# Patient Record
Sex: MALE | Race: WHITE | NOT HISPANIC OR LATINO
[De-identification: names, ages, dates, MRNs, and addresses within clinical notes are randomized per-mention and may not be internally consistent; named-entity substitution may affect disease eponyms.]

---

## 2016-11-18 RX ORDER — SIMVASTATIN 20 MG/1
1 TABLET, FILM COATED ORAL
Qty: 0 | Refills: 0 | DISCHARGE
Start: 2016-11-18

## 2020-01-01 ENCOUNTER — TRANSCRIPTION ENCOUNTER (OUTPATIENT)
Age: 85
End: 2020-01-01

## 2020-01-01 ENCOUNTER — APPOINTMENT (OUTPATIENT)
Dept: CARE COORDINATION | Facility: HOME HEALTH | Age: 85
End: 2020-01-01
Payer: MEDICARE

## 2020-01-01 ENCOUNTER — INPATIENT (INPATIENT)
Facility: HOSPITAL | Age: 85
LOS: 6 days | Discharge: HOME HEALTH | End: 2020-04-16
Attending: GENERAL ACUTE CARE HOSPITAL | Admitting: GENERAL ACUTE CARE HOSPITAL
Payer: MEDICARE

## 2020-01-01 ENCOUNTER — APPOINTMENT (OUTPATIENT)
Dept: ELECTROPHYSIOLOGY | Facility: CLINIC | Age: 85
End: 2020-01-01

## 2020-01-01 ENCOUNTER — INPATIENT (INPATIENT)
Facility: HOSPITAL | Age: 85
LOS: 6 days | Discharge: HOME HEALTH | End: 2020-02-26
Attending: INTERNAL MEDICINE | Admitting: INTERNAL MEDICINE
Payer: MEDICARE

## 2020-01-01 ENCOUNTER — INPATIENT (INPATIENT)
Facility: HOSPITAL | Age: 85
LOS: 3 days | Discharge: HOME HEALTH | End: 2020-06-08
Attending: INTERNAL MEDICINE | Admitting: INTERNAL MEDICINE
Payer: MEDICARE

## 2020-01-01 ENCOUNTER — APPOINTMENT (OUTPATIENT)
Dept: ELECTROPHYSIOLOGY | Facility: CLINIC | Age: 85
End: 2020-01-01
Payer: MEDICARE

## 2020-01-01 ENCOUNTER — INPATIENT (INPATIENT)
Facility: HOSPITAL | Age: 85
LOS: 9 days | Discharge: HOME HEALTH | End: 2020-06-20
Attending: INTERNAL MEDICINE | Admitting: INTERNAL MEDICINE
Payer: MEDICARE

## 2020-01-01 ENCOUNTER — NON-APPOINTMENT (OUTPATIENT)
Age: 85
End: 2020-01-01

## 2020-01-01 VITALS
OXYGEN SATURATION: 98 % | HEART RATE: 75 BPM | SYSTOLIC BLOOD PRESSURE: 142 MMHG | RESPIRATION RATE: 18 BRPM | DIASTOLIC BLOOD PRESSURE: 76 MMHG

## 2020-01-01 VITALS — HEART RATE: 87 BPM | SYSTOLIC BLOOD PRESSURE: 107 MMHG | DIASTOLIC BLOOD PRESSURE: 64 MMHG

## 2020-01-01 VITALS
SYSTOLIC BLOOD PRESSURE: 117 MMHG | DIASTOLIC BLOOD PRESSURE: 70 MMHG | HEART RATE: 60 BPM | HEART RATE: 84 BPM | RESPIRATION RATE: 16 BRPM | SYSTOLIC BLOOD PRESSURE: 102 MMHG | OXYGEN SATURATION: 98 % | TEMPERATURE: 97 F | DIASTOLIC BLOOD PRESSURE: 50 MMHG | RESPIRATION RATE: 16 BRPM

## 2020-01-01 VITALS
RESPIRATION RATE: 18 BRPM | HEART RATE: 78 BPM | TEMPERATURE: 98 F | OXYGEN SATURATION: 98 % | DIASTOLIC BLOOD PRESSURE: 70 MMHG | SYSTOLIC BLOOD PRESSURE: 118 MMHG

## 2020-01-01 VITALS
HEIGHT: 63 IN | RESPIRATION RATE: 16 BRPM | TEMPERATURE: 98 F | SYSTOLIC BLOOD PRESSURE: 123 MMHG | OXYGEN SATURATION: 98 % | DIASTOLIC BLOOD PRESSURE: 83 MMHG | HEART RATE: 95 BPM

## 2020-01-01 VITALS
DIASTOLIC BLOOD PRESSURE: 69 MMHG | OXYGEN SATURATION: 100 % | SYSTOLIC BLOOD PRESSURE: 122 MMHG | HEART RATE: 67 BPM | RESPIRATION RATE: 17 BRPM

## 2020-01-01 VITALS
HEART RATE: 72 BPM | OXYGEN SATURATION: 96 % | SYSTOLIC BLOOD PRESSURE: 117 MMHG | TEMPERATURE: 99 F | RESPIRATION RATE: 18 BRPM | DIASTOLIC BLOOD PRESSURE: 63 MMHG

## 2020-01-01 VITALS
HEART RATE: 104 BPM | DIASTOLIC BLOOD PRESSURE: 93 MMHG | OXYGEN SATURATION: 97 % | TEMPERATURE: 98 F | SYSTOLIC BLOOD PRESSURE: 147 MMHG | RESPIRATION RATE: 18 BRPM

## 2020-01-01 VITALS
OXYGEN SATURATION: 100 % | WEIGHT: 165.35 LBS | RESPIRATION RATE: 18 BRPM | TEMPERATURE: 98 F | DIASTOLIC BLOOD PRESSURE: 72 MMHG | SYSTOLIC BLOOD PRESSURE: 128 MMHG | HEART RATE: 82 BPM

## 2020-01-01 VITALS
HEART RATE: 68 BPM | DIASTOLIC BLOOD PRESSURE: 52 MMHG | TEMPERATURE: 98 F | OXYGEN SATURATION: 99 % | RESPIRATION RATE: 17 BRPM | SYSTOLIC BLOOD PRESSURE: 108 MMHG

## 2020-01-01 DIAGNOSIS — I21.4 NON-ST ELEVATION (NSTEMI) MYOCARDIAL INFARCTION: ICD-10-CM

## 2020-01-01 DIAGNOSIS — N17.9 ACUTE KIDNEY FAILURE, UNSPECIFIED: ICD-10-CM

## 2020-01-01 DIAGNOSIS — R00.1 BRADYCARDIA, UNSPECIFIED: ICD-10-CM

## 2020-01-01 DIAGNOSIS — Z29.9 ENCOUNTER FOR PROPHYLACTIC MEASURES, UNSPECIFIED: ICD-10-CM

## 2020-01-01 DIAGNOSIS — I50.22 CHRONIC SYSTOLIC (CONGESTIVE) HEART FAILURE: ICD-10-CM

## 2020-01-01 DIAGNOSIS — F32.9 MAJOR DEPRESSIVE DISORDER, SINGLE EPISODE, UNSPECIFIED: ICD-10-CM

## 2020-01-01 DIAGNOSIS — R11.0 NAUSEA: ICD-10-CM

## 2020-01-01 DIAGNOSIS — R06.02 SHORTNESS OF BREATH: ICD-10-CM

## 2020-01-01 DIAGNOSIS — Z95.0 PRESENCE OF CARDIAC PACEMAKER: Chronic | ICD-10-CM

## 2020-01-01 DIAGNOSIS — I25.10 ATHEROSCLEROTIC HEART DISEASE OF NATIVE CORONARY ARTERY WITHOUT ANGINA PECTORIS: ICD-10-CM

## 2020-01-01 DIAGNOSIS — G47.00 INSOMNIA, UNSPECIFIED: ICD-10-CM

## 2020-01-01 DIAGNOSIS — Z02.9 ENCOUNTER FOR ADMINISTRATIVE EXAMINATIONS, UNSPECIFIED: ICD-10-CM

## 2020-01-01 DIAGNOSIS — R07.9 CHEST PAIN, UNSPECIFIED: ICD-10-CM

## 2020-01-01 DIAGNOSIS — K92.2 GASTROINTESTINAL HEMORRHAGE, UNSPECIFIED: ICD-10-CM

## 2020-01-01 DIAGNOSIS — I10 ESSENTIAL (PRIMARY) HYPERTENSION: ICD-10-CM

## 2020-01-01 DIAGNOSIS — Z98.61 CORONARY ANGIOPLASTY STATUS: Chronic | ICD-10-CM

## 2020-01-01 DIAGNOSIS — E46 UNSPECIFIED PROTEIN-CALORIE MALNUTRITION: ICD-10-CM

## 2020-01-01 DIAGNOSIS — D64.9 ANEMIA, UNSPECIFIED: ICD-10-CM

## 2020-01-01 DIAGNOSIS — Z95.0 PRESENCE OF CARDIAC PACEMAKER: ICD-10-CM

## 2020-01-01 DIAGNOSIS — F43.20 ADJUSTMENT DISORDER, UNSPECIFIED: ICD-10-CM

## 2020-01-01 DIAGNOSIS — Z71.89 OTHER SPECIFIED COUNSELING: ICD-10-CM

## 2020-01-01 DIAGNOSIS — E11.9 TYPE 2 DIABETES MELLITUS WITHOUT COMPLICATIONS: ICD-10-CM

## 2020-01-01 DIAGNOSIS — N18.9 CHRONIC KIDNEY DISEASE, UNSPECIFIED: ICD-10-CM

## 2020-01-01 DIAGNOSIS — I50.23 ACUTE ON CHRONIC SYSTOLIC (CONGESTIVE) HEART FAILURE: ICD-10-CM

## 2020-01-01 DIAGNOSIS — I50.9 HEART FAILURE, UNSPECIFIED: ICD-10-CM

## 2020-01-01 DIAGNOSIS — I48.91 UNSPECIFIED ATRIAL FIBRILLATION: ICD-10-CM

## 2020-01-01 DIAGNOSIS — Z51.5 ENCOUNTER FOR PALLIATIVE CARE: ICD-10-CM

## 2020-01-01 DIAGNOSIS — E11.69 TYPE 2 DIABETES MELLITUS WITH OTHER SPECIFIED COMPLICATION: ICD-10-CM

## 2020-01-01 LAB
ALBUMIN SERPL ELPH-MCNC: 2.8 G/DL — LOW (ref 3.3–5)
ALBUMIN SERPL ELPH-MCNC: 2.8 G/DL — LOW (ref 3.3–5)
ALBUMIN SERPL ELPH-MCNC: 3 G/DL — LOW (ref 3.3–5)
ALBUMIN SERPL ELPH-MCNC: 3.1 G/DL — LOW (ref 3.3–5)
ALBUMIN SERPL ELPH-MCNC: 3.1 G/DL — LOW (ref 3.3–5)
ALBUMIN SERPL ELPH-MCNC: 3.2 G/DL — LOW (ref 3.3–5)
ALBUMIN SERPL ELPH-MCNC: 3.5 G/DL — SIGNIFICANT CHANGE UP (ref 3.3–5)
ALBUMIN SERPL ELPH-MCNC: 3.6 G/DL — SIGNIFICANT CHANGE UP (ref 3.3–5)
ALBUMIN SERPL ELPH-MCNC: 3.7 G/DL — SIGNIFICANT CHANGE UP (ref 3.3–5)
ALBUMIN SERPL ELPH-MCNC: 3.9 G/DL — SIGNIFICANT CHANGE UP (ref 3.3–5)
ALBUMIN SERPL ELPH-MCNC: 4.1 G/DL — SIGNIFICANT CHANGE UP (ref 3.3–5)
ALP SERPL-CCNC: 59 U/L — SIGNIFICANT CHANGE UP (ref 40–120)
ALP SERPL-CCNC: 63 U/L — SIGNIFICANT CHANGE UP (ref 40–120)
ALP SERPL-CCNC: 70 U/L — SIGNIFICANT CHANGE UP (ref 40–120)
ALP SERPL-CCNC: 73 U/L — SIGNIFICANT CHANGE UP (ref 40–120)
ALP SERPL-CCNC: 74 U/L — SIGNIFICANT CHANGE UP (ref 40–120)
ALP SERPL-CCNC: 75 U/L — SIGNIFICANT CHANGE UP (ref 40–120)
ALP SERPL-CCNC: 75 U/L — SIGNIFICANT CHANGE UP (ref 40–120)
ALP SERPL-CCNC: 76 U/L — SIGNIFICANT CHANGE UP (ref 40–120)
ALP SERPL-CCNC: 82 U/L — SIGNIFICANT CHANGE UP (ref 40–120)
ALT FLD-CCNC: 13 U/L — SIGNIFICANT CHANGE UP (ref 4–41)
ALT FLD-CCNC: 14 U/L — SIGNIFICANT CHANGE UP (ref 4–41)
ALT FLD-CCNC: 33 U/L — SIGNIFICANT CHANGE UP (ref 4–41)
ALT FLD-CCNC: 41 U/L — SIGNIFICANT CHANGE UP (ref 4–41)
ALT FLD-CCNC: 45 U/L — HIGH (ref 4–41)
ALT FLD-CCNC: 6 U/L — SIGNIFICANT CHANGE UP (ref 4–41)
ALT FLD-CCNC: 6 U/L — SIGNIFICANT CHANGE UP (ref 4–41)
ALT FLD-CCNC: 8 U/L — SIGNIFICANT CHANGE UP (ref 4–41)
ALT FLD-CCNC: 8 U/L — SIGNIFICANT CHANGE UP (ref 4–41)
ALT FLD-CCNC: 9 U/L — SIGNIFICANT CHANGE UP (ref 4–41)
ALT FLD-CCNC: 9 U/L — SIGNIFICANT CHANGE UP (ref 4–41)
ANION GAP SERPL CALC-SCNC: 10 MMO/L — SIGNIFICANT CHANGE UP (ref 7–14)
ANION GAP SERPL CALC-SCNC: 10 MMO/L — SIGNIFICANT CHANGE UP (ref 7–14)
ANION GAP SERPL CALC-SCNC: 11 MMO/L — SIGNIFICANT CHANGE UP (ref 7–14)
ANION GAP SERPL CALC-SCNC: 12 MMO/L — SIGNIFICANT CHANGE UP (ref 7–14)
ANION GAP SERPL CALC-SCNC: 13 MMO/L — SIGNIFICANT CHANGE UP (ref 7–14)
ANION GAP SERPL CALC-SCNC: 14 MMO/L — SIGNIFICANT CHANGE UP (ref 7–14)
ANION GAP SERPL CALC-SCNC: 15 MMO/L — HIGH (ref 7–14)
ANION GAP SERPL CALC-SCNC: 16 MMO/L — HIGH (ref 7–14)
ANION GAP SERPL CALC-SCNC: 18 MMO/L — HIGH (ref 7–14)
ANION GAP SERPL CALC-SCNC: 19 MMO/L — HIGH (ref 7–14)
ANION GAP SERPL CALC-SCNC: 20 MMO/L — HIGH (ref 7–14)
ANION GAP SERPL CALC-SCNC: 21 MMO/L — HIGH (ref 7–14)
ANION GAP SERPL CALC-SCNC: 7 MMO/L — SIGNIFICANT CHANGE UP (ref 7–14)
APPEARANCE UR: CLEAR — SIGNIFICANT CHANGE UP
APTT BLD: 105.4 SEC — HIGH (ref 27.5–36.3)
APTT BLD: 125.6 SEC — CRITICAL HIGH (ref 27.5–36.3)
APTT BLD: 39.9 SEC — HIGH (ref 27.5–36.3)
APTT BLD: 42.6 SEC — HIGH (ref 27.5–36.3)
APTT BLD: 42.8 SEC — HIGH (ref 27.5–36.3)
APTT BLD: 43.2 SEC — HIGH (ref 27.5–36.3)
APTT BLD: 43.2 SEC — HIGH (ref 27.5–36.3)
APTT BLD: 47.2 SEC — HIGH (ref 27.5–36.3)
APTT BLD: 47.2 SEC — HIGH (ref 27.5–36.3)
APTT BLD: 53.5 SEC — HIGH (ref 27.5–36.3)
APTT BLD: 82.5 SEC — HIGH (ref 27.5–36.3)
APTT BLD: 96.2 SEC — HIGH (ref 27.5–36.3)
APTT BLD: > 200 SEC — CRITICAL HIGH (ref 27.5–36.3)
AST SERPL-CCNC: 10 U/L — SIGNIFICANT CHANGE UP (ref 4–40)
AST SERPL-CCNC: 12 U/L — SIGNIFICANT CHANGE UP (ref 4–40)
AST SERPL-CCNC: 14 U/L — SIGNIFICANT CHANGE UP (ref 4–40)
AST SERPL-CCNC: 14 U/L — SIGNIFICANT CHANGE UP (ref 4–40)
AST SERPL-CCNC: 16 U/L — SIGNIFICANT CHANGE UP (ref 4–40)
AST SERPL-CCNC: 17 U/L — SIGNIFICANT CHANGE UP (ref 4–40)
AST SERPL-CCNC: 27 U/L — SIGNIFICANT CHANGE UP (ref 4–40)
AST SERPL-CCNC: 28 U/L — SIGNIFICANT CHANGE UP (ref 4–40)
AST SERPL-CCNC: 35 U/L — SIGNIFICANT CHANGE UP (ref 4–40)
AST SERPL-CCNC: 49 U/L — HIGH (ref 4–40)
AST SERPL-CCNC: 50 U/L — HIGH (ref 4–40)
BACTERIA # UR AUTO: NEGATIVE — SIGNIFICANT CHANGE UP
BASE EXCESS BLDV CALC-SCNC: SIGNIFICANT CHANGE UP MMOL/L
BASOPHILS # BLD AUTO: 0.02 K/UL — SIGNIFICANT CHANGE UP (ref 0–0.2)
BASOPHILS # BLD AUTO: 0.03 K/UL — SIGNIFICANT CHANGE UP (ref 0–0.2)
BASOPHILS # BLD AUTO: 0.04 K/UL — SIGNIFICANT CHANGE UP (ref 0–0.2)
BASOPHILS # BLD AUTO: 0.04 K/UL — SIGNIFICANT CHANGE UP (ref 0–0.2)
BASOPHILS # BLD AUTO: 0.05 K/UL — SIGNIFICANT CHANGE UP (ref 0–0.2)
BASOPHILS NFR BLD AUTO: 0.3 % — SIGNIFICANT CHANGE UP (ref 0–2)
BASOPHILS NFR BLD AUTO: 0.3 % — SIGNIFICANT CHANGE UP (ref 0–2)
BASOPHILS NFR BLD AUTO: 0.4 % — SIGNIFICANT CHANGE UP (ref 0–2)
BASOPHILS NFR BLD AUTO: 0.6 % — SIGNIFICANT CHANGE UP (ref 0–2)
BASOPHILS NFR BLD AUTO: 0.6 % — SIGNIFICANT CHANGE UP (ref 0–2)
BILIRUB DIRECT SERPL-MCNC: < 0.2 MG/DL — SIGNIFICANT CHANGE UP (ref 0.1–0.2)
BILIRUB SERPL-MCNC: 0.2 MG/DL — SIGNIFICANT CHANGE UP (ref 0.2–1.2)
BILIRUB SERPL-MCNC: 0.3 MG/DL — SIGNIFICANT CHANGE UP (ref 0.2–1.2)
BILIRUB SERPL-MCNC: 0.4 MG/DL — SIGNIFICANT CHANGE UP (ref 0.2–1.2)
BILIRUB SERPL-MCNC: 0.4 MG/DL — SIGNIFICANT CHANGE UP (ref 0.2–1.2)
BILIRUB SERPL-MCNC: < 0.2 MG/DL — LOW (ref 0.2–1.2)
BILIRUB SERPL-MCNC: < 0.2 MG/DL — LOW (ref 0.2–1.2)
BILIRUB UR-MCNC: NEGATIVE — SIGNIFICANT CHANGE UP
BLD GP AB SCN SERPL QL: NEGATIVE — SIGNIFICANT CHANGE UP
BLOOD GAS VENOUS - CREATININE: 1.9 MG/DL — HIGH (ref 0.5–1.3)
BLOOD UR QL VISUAL: NEGATIVE — SIGNIFICANT CHANGE UP
BUN SERPL-MCNC: 23 MG/DL — SIGNIFICANT CHANGE UP (ref 7–23)
BUN SERPL-MCNC: 25 MG/DL — HIGH (ref 7–23)
BUN SERPL-MCNC: 28 MG/DL — HIGH (ref 7–23)
BUN SERPL-MCNC: 29 MG/DL — HIGH (ref 7–23)
BUN SERPL-MCNC: 35 MG/DL — HIGH (ref 7–23)
BUN SERPL-MCNC: 37 MG/DL — HIGH (ref 7–23)
BUN SERPL-MCNC: 38 MG/DL — HIGH (ref 7–23)
BUN SERPL-MCNC: 39 MG/DL — HIGH (ref 7–23)
BUN SERPL-MCNC: 41 MG/DL — HIGH (ref 7–23)
BUN SERPL-MCNC: 41 MG/DL — HIGH (ref 7–23)
BUN SERPL-MCNC: 42 MG/DL — HIGH (ref 7–23)
BUN SERPL-MCNC: 47 MG/DL — HIGH (ref 7–23)
BUN SERPL-MCNC: 47 MG/DL — HIGH (ref 7–23)
BUN SERPL-MCNC: 48 MG/DL — HIGH (ref 7–23)
BUN SERPL-MCNC: 49 MG/DL — HIGH (ref 7–23)
BUN SERPL-MCNC: 51 MG/DL — HIGH (ref 7–23)
BUN SERPL-MCNC: 52 MG/DL — HIGH (ref 7–23)
BUN SERPL-MCNC: 54 MG/DL — HIGH (ref 7–23)
BUN SERPL-MCNC: 55 MG/DL — HIGH (ref 7–23)
BUN SERPL-MCNC: 55 MG/DL — HIGH (ref 7–23)
BUN SERPL-MCNC: 56 MG/DL — HIGH (ref 7–23)
BUN SERPL-MCNC: 57 MG/DL — HIGH (ref 7–23)
BUN SERPL-MCNC: 58 MG/DL — HIGH (ref 7–23)
BUN SERPL-MCNC: 59 MG/DL — HIGH (ref 7–23)
BUN SERPL-MCNC: 60 MG/DL — HIGH (ref 7–23)
BUN SERPL-MCNC: 61 MG/DL — HIGH (ref 7–23)
BUN SERPL-MCNC: 62 MG/DL — HIGH (ref 7–23)
BUN SERPL-MCNC: 63 MG/DL — HIGH (ref 7–23)
BUN SERPL-MCNC: 74 MG/DL — HIGH (ref 7–23)
CALCIUM SERPL-MCNC: 10.2 MG/DL — SIGNIFICANT CHANGE UP (ref 8.4–10.5)
CALCIUM SERPL-MCNC: 8.1 MG/DL — LOW (ref 8.4–10.5)
CALCIUM SERPL-MCNC: 8.3 MG/DL — LOW (ref 8.4–10.5)
CALCIUM SERPL-MCNC: 8.4 MG/DL — SIGNIFICANT CHANGE UP (ref 8.4–10.5)
CALCIUM SERPL-MCNC: 8.4 MG/DL — SIGNIFICANT CHANGE UP (ref 8.4–10.5)
CALCIUM SERPL-MCNC: 8.5 MG/DL — SIGNIFICANT CHANGE UP (ref 8.4–10.5)
CALCIUM SERPL-MCNC: 8.6 MG/DL — SIGNIFICANT CHANGE UP (ref 8.4–10.5)
CALCIUM SERPL-MCNC: 8.7 MG/DL — SIGNIFICANT CHANGE UP (ref 8.4–10.5)
CALCIUM SERPL-MCNC: 8.8 MG/DL — SIGNIFICANT CHANGE UP (ref 8.4–10.5)
CALCIUM SERPL-MCNC: 8.9 MG/DL — SIGNIFICANT CHANGE UP (ref 8.4–10.5)
CALCIUM SERPL-MCNC: 9 MG/DL — SIGNIFICANT CHANGE UP (ref 8.4–10.5)
CALCIUM SERPL-MCNC: 9.1 MG/DL — SIGNIFICANT CHANGE UP (ref 8.4–10.5)
CALCIUM SERPL-MCNC: 9.1 MG/DL — SIGNIFICANT CHANGE UP (ref 8.4–10.5)
CALCIUM SERPL-MCNC: 9.3 MG/DL — SIGNIFICANT CHANGE UP (ref 8.4–10.5)
CALCIUM SERPL-MCNC: 9.4 MG/DL — SIGNIFICANT CHANGE UP (ref 8.4–10.5)
CALCIUM SERPL-MCNC: 9.5 MG/DL — SIGNIFICANT CHANGE UP (ref 8.4–10.5)
CALCIUM SERPL-MCNC: 9.5 MG/DL — SIGNIFICANT CHANGE UP (ref 8.4–10.5)
CALCIUM SERPL-MCNC: 9.6 MG/DL — SIGNIFICANT CHANGE UP (ref 8.4–10.5)
CALCIUM SERPL-MCNC: 9.8 MG/DL — SIGNIFICANT CHANGE UP (ref 8.4–10.5)
CHLORIDE BLDV-SCNC: 101 MMOL/L — SIGNIFICANT CHANGE UP (ref 96–108)
CHLORIDE SERPL-SCNC: 100 MMOL/L — SIGNIFICANT CHANGE UP (ref 98–107)
CHLORIDE SERPL-SCNC: 101 MMOL/L — SIGNIFICANT CHANGE UP (ref 98–107)
CHLORIDE SERPL-SCNC: 101 MMOL/L — SIGNIFICANT CHANGE UP (ref 98–107)
CHLORIDE SERPL-SCNC: 102 MMOL/L — SIGNIFICANT CHANGE UP (ref 98–107)
CHLORIDE SERPL-SCNC: 104 MMOL/L — SIGNIFICANT CHANGE UP (ref 98–107)
CHLORIDE SERPL-SCNC: 105 MMOL/L — SIGNIFICANT CHANGE UP (ref 98–107)
CHLORIDE SERPL-SCNC: 94 MMOL/L — LOW (ref 98–107)
CHLORIDE SERPL-SCNC: 95 MMOL/L — LOW (ref 98–107)
CHLORIDE SERPL-SCNC: 96 MMOL/L — LOW (ref 98–107)
CHLORIDE SERPL-SCNC: 97 MMOL/L — LOW (ref 98–107)
CHLORIDE SERPL-SCNC: 97 MMOL/L — LOW (ref 98–107)
CHLORIDE SERPL-SCNC: 98 MMOL/L — SIGNIFICANT CHANGE UP (ref 98–107)
CHLORIDE SERPL-SCNC: 99 MMOL/L — SIGNIFICANT CHANGE UP (ref 98–107)
CHOLEST SERPL-MCNC: 133 MG/DL — SIGNIFICANT CHANGE UP (ref 120–199)
CK MB BLD-MCNC: 3.09 NG/ML — SIGNIFICANT CHANGE UP (ref 1–6.6)
CK MB BLD-MCNC: 3.36 NG/ML — SIGNIFICANT CHANGE UP (ref 1–6.6)
CK MB BLD-MCNC: 3.9 NG/ML — SIGNIFICANT CHANGE UP (ref 1–6.6)
CK MB BLD-MCNC: 4.79 NG/ML — SIGNIFICANT CHANGE UP (ref 1–6.6)
CK MB BLD-MCNC: 6.11 NG/ML — SIGNIFICANT CHANGE UP (ref 1–6.6)
CK MB BLD-MCNC: SIGNIFICANT CHANGE UP (ref 0–2.5)
CK SERPL-CCNC: 48 U/L — SIGNIFICANT CHANGE UP (ref 30–200)
CK SERPL-CCNC: 60 U/L — SIGNIFICANT CHANGE UP (ref 30–200)
CK SERPL-CCNC: 74 U/L — SIGNIFICANT CHANGE UP (ref 30–200)
CK SERPL-CCNC: 84 U/L — SIGNIFICANT CHANGE UP (ref 30–200)
CK SERPL-CCNC: 89 U/L — SIGNIFICANT CHANGE UP (ref 30–200)
CO2 SERPL-SCNC: 18 MMOL/L — LOW (ref 22–31)
CO2 SERPL-SCNC: 19 MMOL/L — LOW (ref 22–31)
CO2 SERPL-SCNC: 20 MMOL/L — LOW (ref 22–31)
CO2 SERPL-SCNC: 20 MMOL/L — LOW (ref 22–31)
CO2 SERPL-SCNC: 21 MMOL/L — LOW (ref 22–31)
CO2 SERPL-SCNC: 21 MMOL/L — LOW (ref 22–31)
CO2 SERPL-SCNC: 22 MMOL/L — SIGNIFICANT CHANGE UP (ref 22–31)
CO2 SERPL-SCNC: 22 MMOL/L — SIGNIFICANT CHANGE UP (ref 22–31)
CO2 SERPL-SCNC: 23 MMOL/L — SIGNIFICANT CHANGE UP (ref 22–31)
CO2 SERPL-SCNC: 24 MMOL/L — SIGNIFICANT CHANGE UP (ref 22–31)
CO2 SERPL-SCNC: 25 MMOL/L — SIGNIFICANT CHANGE UP (ref 22–31)
CO2 SERPL-SCNC: 26 MMOL/L — SIGNIFICANT CHANGE UP (ref 22–31)
CO2 SERPL-SCNC: 26 MMOL/L — SIGNIFICANT CHANGE UP (ref 22–31)
CO2 SERPL-SCNC: 27 MMOL/L — SIGNIFICANT CHANGE UP (ref 22–31)
CO2 SERPL-SCNC: 28 MMOL/L — SIGNIFICANT CHANGE UP (ref 22–31)
CO2 SERPL-SCNC: 29 MMOL/L — SIGNIFICANT CHANGE UP (ref 22–31)
CO2 SERPL-SCNC: 30 MMOL/L — SIGNIFICANT CHANGE UP (ref 22–31)
CO2 SERPL-SCNC: 30 MMOL/L — SIGNIFICANT CHANGE UP (ref 22–31)
COLOR SPEC: SIGNIFICANT CHANGE UP
CREAT ?TM UR-MCNC: 78.9 MG/DL — SIGNIFICANT CHANGE UP
CREAT SERPL-MCNC: 1.35 MG/DL — HIGH (ref 0.5–1.3)
CREAT SERPL-MCNC: 1.4 MG/DL — HIGH (ref 0.5–1.3)
CREAT SERPL-MCNC: 1.46 MG/DL — HIGH (ref 0.5–1.3)
CREAT SERPL-MCNC: 1.5 MG/DL — HIGH (ref 0.5–1.3)
CREAT SERPL-MCNC: 1.5 MG/DL — HIGH (ref 0.5–1.3)
CREAT SERPL-MCNC: 1.62 MG/DL — HIGH (ref 0.5–1.3)
CREAT SERPL-MCNC: 1.62 MG/DL — HIGH (ref 0.5–1.3)
CREAT SERPL-MCNC: 1.69 MG/DL — HIGH (ref 0.5–1.3)
CREAT SERPL-MCNC: 1.76 MG/DL — HIGH (ref 0.5–1.3)
CREAT SERPL-MCNC: 1.77 MG/DL — HIGH (ref 0.5–1.3)
CREAT SERPL-MCNC: 1.79 MG/DL — HIGH (ref 0.5–1.3)
CREAT SERPL-MCNC: 1.83 MG/DL — HIGH (ref 0.5–1.3)
CREAT SERPL-MCNC: 1.83 MG/DL — HIGH (ref 0.5–1.3)
CREAT SERPL-MCNC: 1.88 MG/DL — HIGH (ref 0.5–1.3)
CREAT SERPL-MCNC: 1.89 MG/DL — HIGH (ref 0.5–1.3)
CREAT SERPL-MCNC: 1.91 MG/DL — HIGH (ref 0.5–1.3)
CREAT SERPL-MCNC: 1.91 MG/DL — HIGH (ref 0.5–1.3)
CREAT SERPL-MCNC: 1.92 MG/DL — HIGH (ref 0.5–1.3)
CREAT SERPL-MCNC: 1.92 MG/DL — HIGH (ref 0.5–1.3)
CREAT SERPL-MCNC: 1.93 MG/DL — HIGH (ref 0.5–1.3)
CREAT SERPL-MCNC: 1.94 MG/DL — HIGH (ref 0.5–1.3)
CREAT SERPL-MCNC: 1.98 MG/DL — HIGH (ref 0.5–1.3)
CREAT SERPL-MCNC: 1.98 MG/DL — HIGH (ref 0.5–1.3)
CREAT SERPL-MCNC: 2.21 MG/DL — HIGH (ref 0.5–1.3)
CREAT SERPL-MCNC: 2.21 MG/DL — HIGH (ref 0.5–1.3)
CREAT SERPL-MCNC: 2.25 MG/DL — HIGH (ref 0.5–1.3)
CREAT SERPL-MCNC: 2.26 MG/DL — HIGH (ref 0.5–1.3)
CREAT SERPL-MCNC: 2.3 MG/DL — HIGH (ref 0.5–1.3)
CREAT SERPL-MCNC: 2.34 MG/DL — HIGH (ref 0.5–1.3)
CREAT SERPL-MCNC: 2.36 MG/DL — HIGH (ref 0.5–1.3)
CREAT SERPL-MCNC: 2.42 MG/DL — HIGH (ref 0.5–1.3)
CREAT SERPL-MCNC: 2.46 MG/DL — HIGH (ref 0.5–1.3)
CREAT SERPL-MCNC: 2.61 MG/DL — HIGH (ref 0.5–1.3)
CREAT SERPL-MCNC: 3.38 MG/DL — HIGH (ref 0.5–1.3)
EOSINOPHIL # BLD AUTO: 0.01 K/UL — SIGNIFICANT CHANGE UP (ref 0–0.5)
EOSINOPHIL # BLD AUTO: 0.04 K/UL — SIGNIFICANT CHANGE UP (ref 0–0.5)
EOSINOPHIL # BLD AUTO: 0.04 K/UL — SIGNIFICANT CHANGE UP (ref 0–0.5)
EOSINOPHIL # BLD AUTO: 0.07 K/UL — SIGNIFICANT CHANGE UP (ref 0–0.5)
EOSINOPHIL # BLD AUTO: 0.09 K/UL — SIGNIFICANT CHANGE UP (ref 0–0.5)
EOSINOPHIL NFR BLD AUTO: 0.1 % — SIGNIFICANT CHANGE UP (ref 0–6)
EOSINOPHIL NFR BLD AUTO: 0.5 % — SIGNIFICANT CHANGE UP (ref 0–6)
EOSINOPHIL NFR BLD AUTO: 0.6 % — SIGNIFICANT CHANGE UP (ref 0–6)
EOSINOPHIL NFR BLD AUTO: 0.8 % — SIGNIFICANT CHANGE UP (ref 0–6)
EOSINOPHIL NFR BLD AUTO: 1.2 % — SIGNIFICANT CHANGE UP (ref 0–6)
FERRITIN SERPL-MCNC: 62.94 NG/ML — SIGNIFICANT CHANGE UP (ref 30–400)
FOLATE SERPL-MCNC: > 20 NG/ML — HIGH (ref 4.7–20)
GAS PNL BLDV: 130 MMOL/L — LOW (ref 136–146)
GLUCOSE BLDC GLUCOMTR-MCNC: 137 MG/DL — HIGH (ref 70–99)
GLUCOSE BLDC GLUCOMTR-MCNC: 139 MG/DL — HIGH (ref 70–99)
GLUCOSE BLDC GLUCOMTR-MCNC: 141 MG/DL — HIGH (ref 70–99)
GLUCOSE BLDC GLUCOMTR-MCNC: 145 MG/DL — HIGH (ref 70–99)
GLUCOSE BLDC GLUCOMTR-MCNC: 147 MG/DL — HIGH (ref 70–99)
GLUCOSE BLDC GLUCOMTR-MCNC: 148 MG/DL — HIGH (ref 70–99)
GLUCOSE BLDC GLUCOMTR-MCNC: 150 MG/DL — HIGH (ref 70–99)
GLUCOSE BLDC GLUCOMTR-MCNC: 151 MG/DL — HIGH (ref 70–99)
GLUCOSE BLDC GLUCOMTR-MCNC: 155 MG/DL — HIGH (ref 70–99)
GLUCOSE BLDC GLUCOMTR-MCNC: 157 MG/DL — HIGH (ref 70–99)
GLUCOSE BLDC GLUCOMTR-MCNC: 165 MG/DL — HIGH (ref 70–99)
GLUCOSE BLDC GLUCOMTR-MCNC: 169 MG/DL — HIGH (ref 70–99)
GLUCOSE BLDC GLUCOMTR-MCNC: 173 MG/DL — HIGH (ref 70–99)
GLUCOSE BLDC GLUCOMTR-MCNC: 174 MG/DL — HIGH (ref 70–99)
GLUCOSE BLDC GLUCOMTR-MCNC: 176 MG/DL — HIGH (ref 70–99)
GLUCOSE BLDC GLUCOMTR-MCNC: 177 MG/DL — HIGH (ref 70–99)
GLUCOSE BLDC GLUCOMTR-MCNC: 180 MG/DL — HIGH (ref 70–99)
GLUCOSE BLDC GLUCOMTR-MCNC: 181 MG/DL — HIGH (ref 70–99)
GLUCOSE BLDC GLUCOMTR-MCNC: 183 MG/DL — HIGH (ref 70–99)
GLUCOSE BLDC GLUCOMTR-MCNC: 185 MG/DL — HIGH (ref 70–99)
GLUCOSE BLDC GLUCOMTR-MCNC: 186 MG/DL — HIGH (ref 70–99)
GLUCOSE BLDC GLUCOMTR-MCNC: 187 MG/DL — HIGH (ref 70–99)
GLUCOSE BLDC GLUCOMTR-MCNC: 189 MG/DL — HIGH (ref 70–99)
GLUCOSE BLDC GLUCOMTR-MCNC: 190 MG/DL — HIGH (ref 70–99)
GLUCOSE BLDC GLUCOMTR-MCNC: 191 MG/DL — HIGH (ref 70–99)
GLUCOSE BLDC GLUCOMTR-MCNC: 192 MG/DL — HIGH (ref 70–99)
GLUCOSE BLDC GLUCOMTR-MCNC: 193 MG/DL — HIGH (ref 70–99)
GLUCOSE BLDC GLUCOMTR-MCNC: 196 MG/DL — HIGH (ref 70–99)
GLUCOSE BLDC GLUCOMTR-MCNC: 196 MG/DL — HIGH (ref 70–99)
GLUCOSE BLDC GLUCOMTR-MCNC: 198 MG/DL — HIGH (ref 70–99)
GLUCOSE BLDC GLUCOMTR-MCNC: 199 MG/DL — HIGH (ref 70–99)
GLUCOSE BLDC GLUCOMTR-MCNC: 200 MG/DL — HIGH (ref 70–99)
GLUCOSE BLDC GLUCOMTR-MCNC: 202 MG/DL — HIGH (ref 70–99)
GLUCOSE BLDC GLUCOMTR-MCNC: 202 MG/DL — HIGH (ref 70–99)
GLUCOSE BLDC GLUCOMTR-MCNC: 204 MG/DL — HIGH (ref 70–99)
GLUCOSE BLDC GLUCOMTR-MCNC: 206 MG/DL — HIGH (ref 70–99)
GLUCOSE BLDC GLUCOMTR-MCNC: 208 MG/DL — HIGH (ref 70–99)
GLUCOSE BLDC GLUCOMTR-MCNC: 210 MG/DL — HIGH (ref 70–99)
GLUCOSE BLDC GLUCOMTR-MCNC: 213 MG/DL — HIGH (ref 70–99)
GLUCOSE BLDC GLUCOMTR-MCNC: 214 MG/DL — HIGH (ref 70–99)
GLUCOSE BLDC GLUCOMTR-MCNC: 216 MG/DL — HIGH (ref 70–99)
GLUCOSE BLDC GLUCOMTR-MCNC: 216 MG/DL — HIGH (ref 70–99)
GLUCOSE BLDC GLUCOMTR-MCNC: 218 MG/DL — HIGH (ref 70–99)
GLUCOSE BLDC GLUCOMTR-MCNC: 218 MG/DL — HIGH (ref 70–99)
GLUCOSE BLDC GLUCOMTR-MCNC: 225 MG/DL — HIGH (ref 70–99)
GLUCOSE BLDC GLUCOMTR-MCNC: 225 MG/DL — HIGH (ref 70–99)
GLUCOSE BLDC GLUCOMTR-MCNC: 227 MG/DL — HIGH (ref 70–99)
GLUCOSE BLDC GLUCOMTR-MCNC: 228 MG/DL — HIGH (ref 70–99)
GLUCOSE BLDC GLUCOMTR-MCNC: 236 MG/DL — HIGH (ref 70–99)
GLUCOSE BLDC GLUCOMTR-MCNC: 247 MG/DL — HIGH (ref 70–99)
GLUCOSE BLDC GLUCOMTR-MCNC: 248 MG/DL — HIGH (ref 70–99)
GLUCOSE BLDC GLUCOMTR-MCNC: 258 MG/DL — HIGH (ref 70–99)
GLUCOSE BLDC GLUCOMTR-MCNC: 267 MG/DL — HIGH (ref 70–99)
GLUCOSE BLDC GLUCOMTR-MCNC: 268 MG/DL — HIGH (ref 70–99)
GLUCOSE BLDC GLUCOMTR-MCNC: 273 MG/DL — HIGH (ref 70–99)
GLUCOSE BLDC GLUCOMTR-MCNC: 277 MG/DL — HIGH (ref 70–99)
GLUCOSE BLDC GLUCOMTR-MCNC: 285 MG/DL — HIGH (ref 70–99)
GLUCOSE BLDC GLUCOMTR-MCNC: 308 MG/DL — HIGH (ref 70–99)
GLUCOSE BLDC GLUCOMTR-MCNC: 330 MG/DL — HIGH (ref 70–99)
GLUCOSE BLDC GLUCOMTR-MCNC: 341 MG/DL — HIGH (ref 70–99)
GLUCOSE BLDC GLUCOMTR-MCNC: 346 MG/DL — HIGH (ref 70–99)
GLUCOSE BLDC GLUCOMTR-MCNC: 359 MG/DL — HIGH (ref 70–99)
GLUCOSE BLDV-MCNC: 274 MG/DL — HIGH (ref 70–99)
GLUCOSE SERPL-MCNC: 124 MG/DL — HIGH (ref 70–99)
GLUCOSE SERPL-MCNC: 143 MG/DL — HIGH (ref 70–99)
GLUCOSE SERPL-MCNC: 144 MG/DL — HIGH (ref 70–99)
GLUCOSE SERPL-MCNC: 146 MG/DL — HIGH (ref 70–99)
GLUCOSE SERPL-MCNC: 148 MG/DL — HIGH (ref 70–99)
GLUCOSE SERPL-MCNC: 149 MG/DL — HIGH (ref 70–99)
GLUCOSE SERPL-MCNC: 152 MG/DL — HIGH (ref 70–99)
GLUCOSE SERPL-MCNC: 156 MG/DL — HIGH (ref 70–99)
GLUCOSE SERPL-MCNC: 159 MG/DL — HIGH (ref 70–99)
GLUCOSE SERPL-MCNC: 163 MG/DL — HIGH (ref 70–99)
GLUCOSE SERPL-MCNC: 164 MG/DL — HIGH (ref 70–99)
GLUCOSE SERPL-MCNC: 164 MG/DL — HIGH (ref 70–99)
GLUCOSE SERPL-MCNC: 165 MG/DL — HIGH (ref 70–99)
GLUCOSE SERPL-MCNC: 166 MG/DL — HIGH (ref 70–99)
GLUCOSE SERPL-MCNC: 170 MG/DL — HIGH (ref 70–99)
GLUCOSE SERPL-MCNC: 172 MG/DL — HIGH (ref 70–99)
GLUCOSE SERPL-MCNC: 176 MG/DL — HIGH (ref 70–99)
GLUCOSE SERPL-MCNC: 180 MG/DL — HIGH (ref 70–99)
GLUCOSE SERPL-MCNC: 183 MG/DL — HIGH (ref 70–99)
GLUCOSE SERPL-MCNC: 189 MG/DL — HIGH (ref 70–99)
GLUCOSE SERPL-MCNC: 191 MG/DL — HIGH (ref 70–99)
GLUCOSE SERPL-MCNC: 193 MG/DL — HIGH (ref 70–99)
GLUCOSE SERPL-MCNC: 194 MG/DL — HIGH (ref 70–99)
GLUCOSE SERPL-MCNC: 198 MG/DL — HIGH (ref 70–99)
GLUCOSE SERPL-MCNC: 203 MG/DL — HIGH (ref 70–99)
GLUCOSE SERPL-MCNC: 203 MG/DL — HIGH (ref 70–99)
GLUCOSE SERPL-MCNC: 212 MG/DL — HIGH (ref 70–99)
GLUCOSE SERPL-MCNC: 221 MG/DL — HIGH (ref 70–99)
GLUCOSE SERPL-MCNC: 235 MG/DL — HIGH (ref 70–99)
GLUCOSE SERPL-MCNC: 265 MG/DL — HIGH (ref 70–99)
GLUCOSE SERPL-MCNC: 278 MG/DL — HIGH (ref 70–99)
GLUCOSE SERPL-MCNC: 351 MG/DL — HIGH (ref 70–99)
GLUCOSE UR-MCNC: 300 — HIGH
HAPTOGLOB SERPL-MCNC: 180 MG/DL — SIGNIFICANT CHANGE UP (ref 34–200)
HBA1C BLD-MCNC: 6.3 % — HIGH (ref 4–5.6)
HBA1C BLD-MCNC: 6.6 % — HIGH (ref 4–5.6)
HBA1C BLD-MCNC: 7.3 % — HIGH (ref 4–5.6)
HBA1C BLD-MCNC: 7.6 % — HIGH (ref 4–5.6)
HBV CORE AB SER-ACNC: NONREACTIVE — SIGNIFICANT CHANGE UP
HBV SURFACE AB SER-ACNC: NONREACTIVE — SIGNIFICANT CHANGE UP
HBV SURFACE AG SER-ACNC: NONREACTIVE — SIGNIFICANT CHANGE UP
HCO3 BLDV-SCNC: SIGNIFICANT CHANGE UP MMOL/L (ref 20–27)
HCT VFR BLD CALC: 24.4 % — LOW (ref 39–50)
HCT VFR BLD CALC: 24.6 % — LOW (ref 39–50)
HCT VFR BLD CALC: 25.1 % — LOW (ref 39–50)
HCT VFR BLD CALC: 25.2 % — LOW (ref 39–50)
HCT VFR BLD CALC: 25.7 % — LOW (ref 39–50)
HCT VFR BLD CALC: 25.8 % — LOW (ref 39–50)
HCT VFR BLD CALC: 26.1 % — LOW (ref 39–50)
HCT VFR BLD CALC: 26.2 % — LOW (ref 39–50)
HCT VFR BLD CALC: 26.6 % — LOW (ref 39–50)
HCT VFR BLD CALC: 27 % — LOW (ref 39–50)
HCT VFR BLD CALC: 27.2 % — LOW (ref 39–50)
HCT VFR BLD CALC: 27.3 % — LOW (ref 39–50)
HCT VFR BLD CALC: 27.4 % — LOW (ref 39–50)
HCT VFR BLD CALC: 27.5 % — LOW (ref 39–50)
HCT VFR BLD CALC: 27.7 % — LOW (ref 39–50)
HCT VFR BLD CALC: 27.8 % — LOW (ref 39–50)
HCT VFR BLD CALC: 27.9 % — LOW (ref 39–50)
HCT VFR BLD CALC: 28.1 % — LOW (ref 39–50)
HCT VFR BLD CALC: 28.5 % — LOW (ref 39–50)
HCT VFR BLD CALC: 29.1 % — LOW (ref 39–50)
HCT VFR BLD CALC: 29.7 % — LOW (ref 39–50)
HCT VFR BLD CALC: 30 % — LOW (ref 39–50)
HCT VFR BLD CALC: 30.9 % — LOW (ref 39–50)
HCT VFR BLD CALC: 31 % — LOW (ref 39–50)
HCT VFR BLD CALC: 31 % — LOW (ref 39–50)
HCT VFR BLD CALC: 31.5 % — LOW (ref 39–50)
HCT VFR BLD CALC: 31.5 % — LOW (ref 39–50)
HCT VFR BLD CALC: 32.7 % — LOW (ref 39–50)
HCT VFR BLD CALC: 32.8 % — LOW (ref 39–50)
HCT VFR BLD CALC: 33 % — LOW (ref 39–50)
HCT VFR BLD CALC: 33.2 % — LOW (ref 39–50)
HCT VFR BLD CALC: 33.4 % — LOW (ref 39–50)
HCT VFR BLD CALC: 34.3 % — LOW (ref 39–50)
HCT VFR BLD CALC: 34.8 % — LOW (ref 39–50)
HCT VFR BLD CALC: 35.6 % — LOW (ref 39–50)
HCT VFR BLD CALC: 37.4 % — LOW (ref 39–50)
HCT VFR BLDV CALC: 29.5 % — LOW (ref 39–51)
HCV AB S/CO SERPL IA: 0.09 S/CO — SIGNIFICANT CHANGE UP (ref 0–0.99)
HCV AB SERPL-IMP: SIGNIFICANT CHANGE UP
HCV RNA SERPL NAA DL=5-ACNC: NOT DETECTED IU/ML — SIGNIFICANT CHANGE UP
HCV RNA SPEC NAA+PROBE-LOG IU: SIGNIFICANT CHANGE UP
HDLC SERPL-MCNC: 63 MG/DL — HIGH (ref 35–55)
HGB BLD-MCNC: 10 G/DL — LOW (ref 13–17)
HGB BLD-MCNC: 10 G/DL — LOW (ref 13–17)
HGB BLD-MCNC: 10.2 G/DL — LOW (ref 13–17)
HGB BLD-MCNC: 10.4 G/DL — LOW (ref 13–17)
HGB BLD-MCNC: 10.4 G/DL — LOW (ref 13–17)
HGB BLD-MCNC: 10.6 G/DL — LOW (ref 13–17)
HGB BLD-MCNC: 11.8 G/DL — LOW (ref 13–17)
HGB BLD-MCNC: 7.4 G/DL — LOW (ref 13–17)
HGB BLD-MCNC: 7.6 G/DL — LOW (ref 13–17)
HGB BLD-MCNC: 7.7 G/DL — LOW (ref 13–17)
HGB BLD-MCNC: 7.7 G/DL — LOW (ref 13–17)
HGB BLD-MCNC: 7.8 G/DL — LOW (ref 13–17)
HGB BLD-MCNC: 7.9 G/DL — LOW (ref 13–17)
HGB BLD-MCNC: 8 G/DL — LOW (ref 13–17)
HGB BLD-MCNC: 8.2 G/DL — LOW (ref 13–17)
HGB BLD-MCNC: 8.4 G/DL — LOW (ref 13–17)
HGB BLD-MCNC: 8.5 G/DL — LOW (ref 13–17)
HGB BLD-MCNC: 8.6 G/DL — LOW (ref 13–17)
HGB BLD-MCNC: 8.7 G/DL — LOW (ref 13–17)
HGB BLD-MCNC: 8.7 G/DL — LOW (ref 13–17)
HGB BLD-MCNC: 8.8 G/DL — LOW (ref 13–17)
HGB BLD-MCNC: 9 G/DL — LOW (ref 13–17)
HGB BLD-MCNC: 9.2 G/DL — LOW (ref 13–17)
HGB BLD-MCNC: 9.4 G/DL — LOW (ref 13–17)
HGB BLD-MCNC: 9.5 G/DL — LOW (ref 13–17)
HGB BLD-MCNC: 9.5 G/DL — LOW (ref 13–17)
HGB BLD-MCNC: 9.6 G/DL — LOW (ref 13–17)
HGB BLD-MCNC: 9.6 G/DL — LOW (ref 13–17)
HGB BLD-MCNC: 9.7 G/DL — LOW (ref 13–17)
HGB BLD-MCNC: 9.9 G/DL — LOW (ref 13–17)
HGB BLDV-MCNC: 9.5 G/DL — LOW (ref 13–17)
HIV COMBO RESULT: SIGNIFICANT CHANGE UP
HIV1+2 AB SPEC QL: SIGNIFICANT CHANGE UP
HYALINE CASTS # UR AUTO: NEGATIVE — SIGNIFICANT CHANGE UP
IMM GRANULOCYTES NFR BLD AUTO: 0.1 % — SIGNIFICANT CHANGE UP (ref 0–1.5)
IMM GRANULOCYTES NFR BLD AUTO: 0.3 % — SIGNIFICANT CHANGE UP (ref 0–1.5)
IMM GRANULOCYTES NFR BLD AUTO: 0.4 % — SIGNIFICANT CHANGE UP (ref 0–1.5)
INR BLD: 1.09 — SIGNIFICANT CHANGE UP (ref 0.88–1.17)
INR BLD: 1.12 — SIGNIFICANT CHANGE UP (ref 0.88–1.17)
INR BLD: 1.13 — SIGNIFICANT CHANGE UP (ref 0.88–1.17)
INR BLD: 1.14 — SIGNIFICANT CHANGE UP (ref 0.88–1.17)
IRON SATN MFR SERPL: 22 UG/DL — LOW (ref 45–165)
IRON SATN MFR SERPL: 298 UG/DL — SIGNIFICANT CHANGE UP (ref 155–535)
KETONES UR-MCNC: NEGATIVE — SIGNIFICANT CHANGE UP
LACTATE BLDV-MCNC: 1.8 MMOL/L — SIGNIFICANT CHANGE UP (ref 0.5–2)
LDH SERPL L TO P-CCNC: 177 U/L — SIGNIFICANT CHANGE UP (ref 135–225)
LEUKOCYTE ESTERASE UR-ACNC: NEGATIVE — SIGNIFICANT CHANGE UP
LIDOCAIN IGE QN: 17.8 U/L — SIGNIFICANT CHANGE UP (ref 7–60)
LIPID PNL WITH DIRECT LDL SERPL: 63 MG/DL — SIGNIFICANT CHANGE UP
LYMPHOCYTES # BLD AUTO: 0.63 K/UL — LOW (ref 1–3.3)
LYMPHOCYTES # BLD AUTO: 0.71 K/UL — LOW (ref 1–3.3)
LYMPHOCYTES # BLD AUTO: 0.79 K/UL — LOW (ref 1–3.3)
LYMPHOCYTES # BLD AUTO: 0.82 K/UL — LOW (ref 1–3.3)
LYMPHOCYTES # BLD AUTO: 0.84 K/UL — LOW (ref 1–3.3)
LYMPHOCYTES # BLD AUTO: 10.8 % — LOW (ref 13–44)
LYMPHOCYTES # BLD AUTO: 8.7 % — LOW (ref 13–44)
LYMPHOCYTES # BLD AUTO: 9 % — LOW (ref 13–44)
LYMPHOCYTES # BLD AUTO: 9.2 % — LOW (ref 13–44)
LYMPHOCYTES # BLD AUTO: 9.5 % — LOW (ref 13–44)
MAGNESIUM SERPL-MCNC: 1.5 MG/DL — LOW (ref 1.6–2.6)
MAGNESIUM SERPL-MCNC: 1.5 MG/DL — LOW (ref 1.6–2.6)
MAGNESIUM SERPL-MCNC: 1.6 MG/DL — SIGNIFICANT CHANGE UP (ref 1.6–2.6)
MAGNESIUM SERPL-MCNC: 1.7 MG/DL — SIGNIFICANT CHANGE UP (ref 1.6–2.6)
MAGNESIUM SERPL-MCNC: 1.8 MG/DL — SIGNIFICANT CHANGE UP (ref 1.6–2.6)
MAGNESIUM SERPL-MCNC: 1.8 MG/DL — SIGNIFICANT CHANGE UP (ref 1.6–2.6)
MAGNESIUM SERPL-MCNC: 1.9 MG/DL — SIGNIFICANT CHANGE UP (ref 1.6–2.6)
MAGNESIUM SERPL-MCNC: 2 MG/DL — SIGNIFICANT CHANGE UP (ref 1.6–2.6)
MAGNESIUM SERPL-MCNC: 2.1 MG/DL — SIGNIFICANT CHANGE UP (ref 1.6–2.6)
MCHC RBC-ENTMCNC: 24.4 PG — LOW (ref 27–34)
MCHC RBC-ENTMCNC: 24.6 PG — LOW (ref 27–34)
MCHC RBC-ENTMCNC: 24.6 PG — LOW (ref 27–34)
MCHC RBC-ENTMCNC: 24.8 PG — LOW (ref 27–34)
MCHC RBC-ENTMCNC: 25 PG — LOW (ref 27–34)
MCHC RBC-ENTMCNC: 25.1 PG — LOW (ref 27–34)
MCHC RBC-ENTMCNC: 25.2 PG — LOW (ref 27–34)
MCHC RBC-ENTMCNC: 25.4 PG — LOW (ref 27–34)
MCHC RBC-ENTMCNC: 25.5 PG — LOW (ref 27–34)
MCHC RBC-ENTMCNC: 25.6 PG — LOW (ref 27–34)
MCHC RBC-ENTMCNC: 25.8 PG — LOW (ref 27–34)
MCHC RBC-ENTMCNC: 26 PG — LOW (ref 27–34)
MCHC RBC-ENTMCNC: 26.2 PG — LOW (ref 27–34)
MCHC RBC-ENTMCNC: 26.6 PG — LOW (ref 27–34)
MCHC RBC-ENTMCNC: 26.7 PG — LOW (ref 27–34)
MCHC RBC-ENTMCNC: 26.9 PG — LOW (ref 27–34)
MCHC RBC-ENTMCNC: 27.5 PG — SIGNIFICANT CHANGE UP (ref 27–34)
MCHC RBC-ENTMCNC: 27.6 PG — SIGNIFICANT CHANGE UP (ref 27–34)
MCHC RBC-ENTMCNC: 27.6 PG — SIGNIFICANT CHANGE UP (ref 27–34)
MCHC RBC-ENTMCNC: 27.7 PG — SIGNIFICANT CHANGE UP (ref 27–34)
MCHC RBC-ENTMCNC: 27.8 PG — SIGNIFICANT CHANGE UP (ref 27–34)
MCHC RBC-ENTMCNC: 28.1 PG — SIGNIFICANT CHANGE UP (ref 27–34)
MCHC RBC-ENTMCNC: 28.2 PG — SIGNIFICANT CHANGE UP (ref 27–34)
MCHC RBC-ENTMCNC: 28.3 % — LOW (ref 32–36)
MCHC RBC-ENTMCNC: 28.3 PG — SIGNIFICANT CHANGE UP (ref 27–34)
MCHC RBC-ENTMCNC: 28.5 PG — SIGNIFICANT CHANGE UP (ref 27–34)
MCHC RBC-ENTMCNC: 28.5 PG — SIGNIFICANT CHANGE UP (ref 27–34)
MCHC RBC-ENTMCNC: 28.6 PG — SIGNIFICANT CHANGE UP (ref 27–34)
MCHC RBC-ENTMCNC: 28.6 PG — SIGNIFICANT CHANGE UP (ref 27–34)
MCHC RBC-ENTMCNC: 28.8 PG — SIGNIFICANT CHANGE UP (ref 27–34)
MCHC RBC-ENTMCNC: 28.9 % — LOW (ref 32–36)
MCHC RBC-ENTMCNC: 28.9 % — LOW (ref 32–36)
MCHC RBC-ENTMCNC: 28.9 PG — SIGNIFICANT CHANGE UP (ref 27–34)
MCHC RBC-ENTMCNC: 29.2 % — LOW (ref 32–36)
MCHC RBC-ENTMCNC: 29.2 PG — SIGNIFICANT CHANGE UP (ref 27–34)
MCHC RBC-ENTMCNC: 29.4 % — LOW (ref 32–36)
MCHC RBC-ENTMCNC: 29.9 % — LOW (ref 32–36)
MCHC RBC-ENTMCNC: 29.9 % — LOW (ref 32–36)
MCHC RBC-ENTMCNC: 30 % — LOW (ref 32–36)
MCHC RBC-ENTMCNC: 30.1 % — LOW (ref 32–36)
MCHC RBC-ENTMCNC: 30.1 % — LOW (ref 32–36)
MCHC RBC-ENTMCNC: 30.2 % — LOW (ref 32–36)
MCHC RBC-ENTMCNC: 30.3 % — LOW (ref 32–36)
MCHC RBC-ENTMCNC: 30.3 % — LOW (ref 32–36)
MCHC RBC-ENTMCNC: 30.4 % — LOW (ref 32–36)
MCHC RBC-ENTMCNC: 30.4 % — LOW (ref 32–36)
MCHC RBC-ENTMCNC: 30.5 % — LOW (ref 32–36)
MCHC RBC-ENTMCNC: 30.5 % — LOW (ref 32–36)
MCHC RBC-ENTMCNC: 30.6 % — LOW (ref 32–36)
MCHC RBC-ENTMCNC: 30.6 % — LOW (ref 32–36)
MCHC RBC-ENTMCNC: 30.8 % — LOW (ref 32–36)
MCHC RBC-ENTMCNC: 30.9 % — LOW (ref 32–36)
MCHC RBC-ENTMCNC: 31 % — LOW (ref 32–36)
MCHC RBC-ENTMCNC: 31.1 % — LOW (ref 32–36)
MCHC RBC-ENTMCNC: 31.3 % — LOW (ref 32–36)
MCHC RBC-ENTMCNC: 31.6 % — LOW (ref 32–36)
MCHC RBC-ENTMCNC: 32.3 % — SIGNIFICANT CHANGE UP (ref 32–36)
MCV RBC AUTO: 82.3 FL — SIGNIFICANT CHANGE UP (ref 80–100)
MCV RBC AUTO: 82.4 FL — SIGNIFICANT CHANGE UP (ref 80–100)
MCV RBC AUTO: 83 FL — SIGNIFICANT CHANGE UP (ref 80–100)
MCV RBC AUTO: 83.7 FL — SIGNIFICANT CHANGE UP (ref 80–100)
MCV RBC AUTO: 84.2 FL — SIGNIFICANT CHANGE UP (ref 80–100)
MCV RBC AUTO: 84.2 FL — SIGNIFICANT CHANGE UP (ref 80–100)
MCV RBC AUTO: 84.5 FL — SIGNIFICANT CHANGE UP (ref 80–100)
MCV RBC AUTO: 84.6 FL — SIGNIFICANT CHANGE UP (ref 80–100)
MCV RBC AUTO: 84.9 FL — SIGNIFICANT CHANGE UP (ref 80–100)
MCV RBC AUTO: 85 FL — SIGNIFICANT CHANGE UP (ref 80–100)
MCV RBC AUTO: 85.2 FL — SIGNIFICANT CHANGE UP (ref 80–100)
MCV RBC AUTO: 85.2 FL — SIGNIFICANT CHANGE UP (ref 80–100)
MCV RBC AUTO: 85.9 FL — SIGNIFICANT CHANGE UP (ref 80–100)
MCV RBC AUTO: 85.9 FL — SIGNIFICANT CHANGE UP (ref 80–100)
MCV RBC AUTO: 86.1 FL — SIGNIFICANT CHANGE UP (ref 80–100)
MCV RBC AUTO: 86.4 FL — SIGNIFICANT CHANGE UP (ref 80–100)
MCV RBC AUTO: 86.5 FL — SIGNIFICANT CHANGE UP (ref 80–100)
MCV RBC AUTO: 87.1 FL — SIGNIFICANT CHANGE UP (ref 80–100)
MCV RBC AUTO: 89.5 FL — SIGNIFICANT CHANGE UP (ref 80–100)
MCV RBC AUTO: 89.7 FL — SIGNIFICANT CHANGE UP (ref 80–100)
MCV RBC AUTO: 89.8 FL — SIGNIFICANT CHANGE UP (ref 80–100)
MCV RBC AUTO: 91 FL — SIGNIFICANT CHANGE UP (ref 80–100)
MCV RBC AUTO: 91 FL — SIGNIFICANT CHANGE UP (ref 80–100)
MCV RBC AUTO: 91.5 FL — SIGNIFICANT CHANGE UP (ref 80–100)
MCV RBC AUTO: 91.5 FL — SIGNIFICANT CHANGE UP (ref 80–100)
MCV RBC AUTO: 91.8 FL — SIGNIFICANT CHANGE UP (ref 80–100)
MCV RBC AUTO: 91.9 FL — SIGNIFICANT CHANGE UP (ref 80–100)
MCV RBC AUTO: 91.9 FL — SIGNIFICANT CHANGE UP (ref 80–100)
MCV RBC AUTO: 92.1 FL — SIGNIFICANT CHANGE UP (ref 80–100)
MCV RBC AUTO: 92.3 FL — SIGNIFICANT CHANGE UP (ref 80–100)
MCV RBC AUTO: 92.7 FL — SIGNIFICANT CHANGE UP (ref 80–100)
MCV RBC AUTO: 93.1 FL — SIGNIFICANT CHANGE UP (ref 80–100)
MCV RBC AUTO: 93.2 FL — SIGNIFICANT CHANGE UP (ref 80–100)
MCV RBC AUTO: 93.4 FL — SIGNIFICANT CHANGE UP (ref 80–100)
MCV RBC AUTO: 95.5 FL — SIGNIFICANT CHANGE UP (ref 80–100)
MCV RBC AUTO: 95.7 FL — SIGNIFICANT CHANGE UP (ref 80–100)
MONOCYTES # BLD AUTO: 0.65 K/UL — SIGNIFICANT CHANGE UP (ref 0–0.9)
MONOCYTES # BLD AUTO: 0.69 K/UL — SIGNIFICANT CHANGE UP (ref 0–0.9)
MONOCYTES # BLD AUTO: 0.72 K/UL — SIGNIFICANT CHANGE UP (ref 0–0.9)
MONOCYTES # BLD AUTO: 0.74 K/UL — SIGNIFICANT CHANGE UP (ref 0–0.9)
MONOCYTES # BLD AUTO: 0.86 K/UL — SIGNIFICANT CHANGE UP (ref 0–0.9)
MONOCYTES NFR BLD AUTO: 10.6 % — SIGNIFICANT CHANGE UP (ref 2–14)
MONOCYTES NFR BLD AUTO: 8 % — SIGNIFICANT CHANGE UP (ref 2–14)
MONOCYTES NFR BLD AUTO: 8.4 % — SIGNIFICANT CHANGE UP (ref 2–14)
MONOCYTES NFR BLD AUTO: 9.3 % — SIGNIFICANT CHANGE UP (ref 2–14)
MONOCYTES NFR BLD AUTO: 9.5 % — SIGNIFICANT CHANGE UP (ref 2–14)
NEUTROPHILS # BLD AUTO: 5.53 K/UL — SIGNIFICANT CHANGE UP (ref 1.8–7.4)
NEUTROPHILS # BLD AUTO: 6.02 K/UL — SIGNIFICANT CHANGE UP (ref 1.8–7.4)
NEUTROPHILS # BLD AUTO: 6.32 K/UL — SIGNIFICANT CHANGE UP (ref 1.8–7.4)
NEUTROPHILS # BLD AUTO: 7.02 K/UL — SIGNIFICANT CHANGE UP (ref 1.8–7.4)
NEUTROPHILS # BLD AUTO: 7.25 K/UL — SIGNIFICANT CHANGE UP (ref 1.8–7.4)
NEUTROPHILS NFR BLD AUTO: 77.7 % — HIGH (ref 43–77)
NEUTROPHILS NFR BLD AUTO: 78.8 % — HIGH (ref 43–77)
NEUTROPHILS NFR BLD AUTO: 80.2 % — HIGH (ref 43–77)
NEUTROPHILS NFR BLD AUTO: 81.4 % — HIGH (ref 43–77)
NEUTROPHILS NFR BLD AUTO: 81.9 % — HIGH (ref 43–77)
NITRITE UR-MCNC: NEGATIVE — SIGNIFICANT CHANGE UP
NRBC # FLD: 0 K/UL — SIGNIFICANT CHANGE UP (ref 0–0)
NT-PROBNP SERPL-SCNC: SIGNIFICANT CHANGE UP PG/ML
OB PNL STL: POSITIVE — SIGNIFICANT CHANGE UP
PCO2 BLDV: 38 MMHG — LOW (ref 41–51)
PH BLDV: SIGNIFICANT CHANGE UP PH (ref 7.32–7.43)
PH UR: 5.5 — SIGNIFICANT CHANGE UP (ref 5–8)
PHOSPHATE SERPL-MCNC: 1.7 MG/DL — LOW (ref 2.5–4.5)
PHOSPHATE SERPL-MCNC: 1.7 MG/DL — LOW (ref 2.5–4.5)
PHOSPHATE SERPL-MCNC: 1.8 MG/DL — LOW (ref 2.5–4.5)
PHOSPHATE SERPL-MCNC: 1.9 MG/DL — LOW (ref 2.5–4.5)
PHOSPHATE SERPL-MCNC: 2.4 MG/DL — LOW (ref 2.5–4.5)
PHOSPHATE SERPL-MCNC: 2.9 MG/DL — SIGNIFICANT CHANGE UP (ref 2.5–4.5)
PHOSPHATE SERPL-MCNC: 3.2 MG/DL — SIGNIFICANT CHANGE UP (ref 2.5–4.5)
PHOSPHATE SERPL-MCNC: 3.5 MG/DL — SIGNIFICANT CHANGE UP (ref 2.5–4.5)
PHOSPHATE SERPL-MCNC: 3.5 MG/DL — SIGNIFICANT CHANGE UP (ref 2.5–4.5)
PHOSPHATE SERPL-MCNC: 3.7 MG/DL — SIGNIFICANT CHANGE UP (ref 2.5–4.5)
PHOSPHATE SERPL-MCNC: 3.8 MG/DL — SIGNIFICANT CHANGE UP (ref 2.5–4.5)
PHOSPHATE SERPL-MCNC: 3.8 MG/DL — SIGNIFICANT CHANGE UP (ref 2.5–4.5)
PHOSPHATE SERPL-MCNC: 4.2 MG/DL — SIGNIFICANT CHANGE UP (ref 2.5–4.5)
PHOSPHATE SERPL-MCNC: 4.3 MG/DL — SIGNIFICANT CHANGE UP (ref 2.5–4.5)
PHOSPHATE SERPL-MCNC: 4.6 MG/DL — HIGH (ref 2.5–4.5)
PHOSPHATE SERPL-MCNC: 4.8 MG/DL — HIGH (ref 2.5–4.5)
PLATELET # BLD AUTO: 155 K/UL — SIGNIFICANT CHANGE UP (ref 150–400)
PLATELET # BLD AUTO: 178 K/UL — SIGNIFICANT CHANGE UP (ref 150–400)
PLATELET # BLD AUTO: 191 K/UL — SIGNIFICANT CHANGE UP (ref 150–400)
PLATELET # BLD AUTO: 192 K/UL — SIGNIFICANT CHANGE UP (ref 150–400)
PLATELET # BLD AUTO: 199 K/UL — SIGNIFICANT CHANGE UP (ref 150–400)
PLATELET # BLD AUTO: 200 K/UL — SIGNIFICANT CHANGE UP (ref 150–400)
PLATELET # BLD AUTO: 201 K/UL — SIGNIFICANT CHANGE UP (ref 150–400)
PLATELET # BLD AUTO: 202 K/UL — SIGNIFICANT CHANGE UP (ref 150–400)
PLATELET # BLD AUTO: 203 K/UL — SIGNIFICANT CHANGE UP (ref 150–400)
PLATELET # BLD AUTO: 210 K/UL — SIGNIFICANT CHANGE UP (ref 150–400)
PLATELET # BLD AUTO: 210 K/UL — SIGNIFICANT CHANGE UP (ref 150–400)
PLATELET # BLD AUTO: 218 K/UL — SIGNIFICANT CHANGE UP (ref 150–400)
PLATELET # BLD AUTO: 220 K/UL — SIGNIFICANT CHANGE UP (ref 150–400)
PLATELET # BLD AUTO: 223 K/UL — SIGNIFICANT CHANGE UP (ref 150–400)
PLATELET # BLD AUTO: 226 K/UL — SIGNIFICANT CHANGE UP (ref 150–400)
PLATELET # BLD AUTO: 228 K/UL — SIGNIFICANT CHANGE UP (ref 150–400)
PLATELET # BLD AUTO: 237 K/UL — SIGNIFICANT CHANGE UP (ref 150–400)
PLATELET # BLD AUTO: 243 K/UL — SIGNIFICANT CHANGE UP (ref 150–400)
PLATELET # BLD AUTO: 249 K/UL — SIGNIFICANT CHANGE UP (ref 150–400)
PLATELET # BLD AUTO: 260 K/UL — SIGNIFICANT CHANGE UP (ref 150–400)
PLATELET # BLD AUTO: 269 K/UL — SIGNIFICANT CHANGE UP (ref 150–400)
PLATELET # BLD AUTO: 281 K/UL — SIGNIFICANT CHANGE UP (ref 150–400)
PLATELET # BLD AUTO: 282 K/UL — SIGNIFICANT CHANGE UP (ref 150–400)
PLATELET # BLD AUTO: 285 K/UL — SIGNIFICANT CHANGE UP (ref 150–400)
PLATELET # BLD AUTO: 286 K/UL — SIGNIFICANT CHANGE UP (ref 150–400)
PLATELET # BLD AUTO: 298 K/UL — SIGNIFICANT CHANGE UP (ref 150–400)
PLATELET # BLD AUTO: 300 K/UL — SIGNIFICANT CHANGE UP (ref 150–400)
PLATELET # BLD AUTO: 306 K/UL — SIGNIFICANT CHANGE UP (ref 150–400)
PLATELET # BLD AUTO: 352 K/UL — SIGNIFICANT CHANGE UP (ref 150–400)
PLATELET # BLD AUTO: 359 K/UL — SIGNIFICANT CHANGE UP (ref 150–400)
PLATELET # BLD AUTO: 362 K/UL — SIGNIFICANT CHANGE UP (ref 150–400)
PLATELET # BLD AUTO: 366 K/UL — SIGNIFICANT CHANGE UP (ref 150–400)
PLATELET # BLD AUTO: 380 K/UL — SIGNIFICANT CHANGE UP (ref 150–400)
PLATELET # BLD AUTO: 410 K/UL — HIGH (ref 150–400)
PLATELET # BLD AUTO: 427 K/UL — HIGH (ref 150–400)
PMV BLD: 10.1 FL — SIGNIFICANT CHANGE UP (ref 7–13)
PMV BLD: 10.3 FL — SIGNIFICANT CHANGE UP (ref 7–13)
PMV BLD: 10.4 FL — SIGNIFICANT CHANGE UP (ref 7–13)
PMV BLD: 10.4 FL — SIGNIFICANT CHANGE UP (ref 7–13)
PMV BLD: 10.5 FL — SIGNIFICANT CHANGE UP (ref 7–13)
PMV BLD: 10.5 FL — SIGNIFICANT CHANGE UP (ref 7–13)
PMV BLD: 10.6 FL — SIGNIFICANT CHANGE UP (ref 7–13)
PMV BLD: 10.8 FL — SIGNIFICANT CHANGE UP (ref 7–13)
PMV BLD: 10.9 FL — SIGNIFICANT CHANGE UP (ref 7–13)
PMV BLD: 11 FL — SIGNIFICANT CHANGE UP (ref 7–13)
PMV BLD: 11.1 FL — SIGNIFICANT CHANGE UP (ref 7–13)
PMV BLD: 11.2 FL — SIGNIFICANT CHANGE UP (ref 7–13)
PMV BLD: 11.3 FL — SIGNIFICANT CHANGE UP (ref 7–13)
PMV BLD: 11.4 FL — SIGNIFICANT CHANGE UP (ref 7–13)
PMV BLD: 11.6 FL — SIGNIFICANT CHANGE UP (ref 7–13)
PMV BLD: 11.7 FL — SIGNIFICANT CHANGE UP (ref 7–13)
PMV BLD: 11.7 FL — SIGNIFICANT CHANGE UP (ref 7–13)
PMV BLD: 9.6 FL — SIGNIFICANT CHANGE UP (ref 7–13)
PMV BLD: 9.7 FL — SIGNIFICANT CHANGE UP (ref 7–13)
PMV BLD: 9.7 FL — SIGNIFICANT CHANGE UP (ref 7–13)
PMV BLD: 9.8 FL — SIGNIFICANT CHANGE UP (ref 7–13)
PMV BLD: 9.9 FL — SIGNIFICANT CHANGE UP (ref 7–13)
PO2 BLDV: 66 MMHG — HIGH (ref 35–40)
POTASSIUM BLDV-SCNC: 4.6 MMOL/L — HIGH (ref 3.4–4.5)
POTASSIUM SERPL-MCNC: 3.6 MMOL/L — SIGNIFICANT CHANGE UP (ref 3.5–5.3)
POTASSIUM SERPL-MCNC: 3.7 MMOL/L — SIGNIFICANT CHANGE UP (ref 3.5–5.3)
POTASSIUM SERPL-MCNC: 3.8 MMOL/L — SIGNIFICANT CHANGE UP (ref 3.5–5.3)
POTASSIUM SERPL-MCNC: 3.9 MMOL/L — SIGNIFICANT CHANGE UP (ref 3.5–5.3)
POTASSIUM SERPL-MCNC: 4 MMOL/L — SIGNIFICANT CHANGE UP (ref 3.5–5.3)
POTASSIUM SERPL-MCNC: 4.2 MMOL/L — SIGNIFICANT CHANGE UP (ref 3.5–5.3)
POTASSIUM SERPL-MCNC: 4.2 MMOL/L — SIGNIFICANT CHANGE UP (ref 3.5–5.3)
POTASSIUM SERPL-MCNC: 4.3 MMOL/L — SIGNIFICANT CHANGE UP (ref 3.5–5.3)
POTASSIUM SERPL-MCNC: 4.3 MMOL/L — SIGNIFICANT CHANGE UP (ref 3.5–5.3)
POTASSIUM SERPL-MCNC: 4.4 MMOL/L — SIGNIFICANT CHANGE UP (ref 3.5–5.3)
POTASSIUM SERPL-MCNC: 4.5 MMOL/L — SIGNIFICANT CHANGE UP (ref 3.5–5.3)
POTASSIUM SERPL-MCNC: 4.6 MMOL/L — SIGNIFICANT CHANGE UP (ref 3.5–5.3)
POTASSIUM SERPL-MCNC: 4.7 MMOL/L — SIGNIFICANT CHANGE UP (ref 3.5–5.3)
POTASSIUM SERPL-MCNC: 4.7 MMOL/L — SIGNIFICANT CHANGE UP (ref 3.5–5.3)
POTASSIUM SERPL-MCNC: 4.8 MMOL/L — SIGNIFICANT CHANGE UP (ref 3.5–5.3)
POTASSIUM SERPL-MCNC: 4.9 MMOL/L — SIGNIFICANT CHANGE UP (ref 3.5–5.3)
POTASSIUM SERPL-MCNC: 4.9 MMOL/L — SIGNIFICANT CHANGE UP (ref 3.5–5.3)
POTASSIUM SERPL-MCNC: 5 MMOL/L — SIGNIFICANT CHANGE UP (ref 3.5–5.3)
POTASSIUM SERPL-MCNC: 5.1 MMOL/L — SIGNIFICANT CHANGE UP (ref 3.5–5.3)
POTASSIUM SERPL-MCNC: 6.3 MMOL/L — CRITICAL HIGH (ref 3.5–5.3)
POTASSIUM SERPL-SCNC: 3.6 MMOL/L — SIGNIFICANT CHANGE UP (ref 3.5–5.3)
POTASSIUM SERPL-SCNC: 3.7 MMOL/L — SIGNIFICANT CHANGE UP (ref 3.5–5.3)
POTASSIUM SERPL-SCNC: 3.8 MMOL/L — SIGNIFICANT CHANGE UP (ref 3.5–5.3)
POTASSIUM SERPL-SCNC: 3.9 MMOL/L — SIGNIFICANT CHANGE UP (ref 3.5–5.3)
POTASSIUM SERPL-SCNC: 4 MMOL/L — SIGNIFICANT CHANGE UP (ref 3.5–5.3)
POTASSIUM SERPL-SCNC: 4.2 MMOL/L — SIGNIFICANT CHANGE UP (ref 3.5–5.3)
POTASSIUM SERPL-SCNC: 4.2 MMOL/L — SIGNIFICANT CHANGE UP (ref 3.5–5.3)
POTASSIUM SERPL-SCNC: 4.3 MMOL/L — SIGNIFICANT CHANGE UP (ref 3.5–5.3)
POTASSIUM SERPL-SCNC: 4.3 MMOL/L — SIGNIFICANT CHANGE UP (ref 3.5–5.3)
POTASSIUM SERPL-SCNC: 4.4 MMOL/L — SIGNIFICANT CHANGE UP (ref 3.5–5.3)
POTASSIUM SERPL-SCNC: 4.5 MMOL/L — SIGNIFICANT CHANGE UP (ref 3.5–5.3)
POTASSIUM SERPL-SCNC: 4.6 MMOL/L — SIGNIFICANT CHANGE UP (ref 3.5–5.3)
POTASSIUM SERPL-SCNC: 4.7 MMOL/L — SIGNIFICANT CHANGE UP (ref 3.5–5.3)
POTASSIUM SERPL-SCNC: 4.7 MMOL/L — SIGNIFICANT CHANGE UP (ref 3.5–5.3)
POTASSIUM SERPL-SCNC: 4.8 MMOL/L — SIGNIFICANT CHANGE UP (ref 3.5–5.3)
POTASSIUM SERPL-SCNC: 4.9 MMOL/L — SIGNIFICANT CHANGE UP (ref 3.5–5.3)
POTASSIUM SERPL-SCNC: 4.9 MMOL/L — SIGNIFICANT CHANGE UP (ref 3.5–5.3)
POTASSIUM SERPL-SCNC: 5 MMOL/L — SIGNIFICANT CHANGE UP (ref 3.5–5.3)
POTASSIUM SERPL-SCNC: 5.1 MMOL/L — SIGNIFICANT CHANGE UP (ref 3.5–5.3)
POTASSIUM SERPL-SCNC: 6.3 MMOL/L — CRITICAL HIGH (ref 3.5–5.3)
PROT SERPL-MCNC: 5.3 G/DL — LOW (ref 6–8.3)
PROT SERPL-MCNC: 5.5 G/DL — LOW (ref 6–8.3)
PROT SERPL-MCNC: 5.6 G/DL — LOW (ref 6–8.3)
PROT SERPL-MCNC: 5.8 G/DL — LOW (ref 6–8.3)
PROT SERPL-MCNC: 6.2 G/DL — SIGNIFICANT CHANGE UP (ref 6–8.3)
PROT SERPL-MCNC: 6.4 G/DL — SIGNIFICANT CHANGE UP (ref 6–8.3)
PROT SERPL-MCNC: 6.6 G/DL — SIGNIFICANT CHANGE UP (ref 6–8.3)
PROT SERPL-MCNC: 6.7 G/DL — SIGNIFICANT CHANGE UP (ref 6–8.3)
PROT SERPL-MCNC: 6.9 G/DL — SIGNIFICANT CHANGE UP (ref 6–8.3)
PROT UR-MCNC: 20 — SIGNIFICANT CHANGE UP
PROTHROM AB SERPL-ACNC: 12.5 SEC — SIGNIFICANT CHANGE UP (ref 9.8–13.1)
PROTHROM AB SERPL-ACNC: 12.8 SEC — SIGNIFICANT CHANGE UP (ref 9.8–13.1)
PROTHROM AB SERPL-ACNC: 13 SEC — SIGNIFICANT CHANGE UP (ref 9.8–13.1)
PROTHROM AB SERPL-ACNC: 13.1 SEC — SIGNIFICANT CHANGE UP (ref 9.8–13.1)
PTH-INTACT SERPL-MCNC: 65.45 PG/ML — HIGH (ref 15–65)
RBC # BLD: 2.67 M/UL — LOW (ref 4.2–5.8)
RBC # BLD: 2.68 M/UL — LOW (ref 4.2–5.8)
RBC # BLD: 2.73 M/UL — LOW (ref 4.2–5.8)
RBC # BLD: 2.77 M/UL — LOW (ref 4.2–5.8)
RBC # BLD: 2.81 M/UL — LOW (ref 4.2–5.8)
RBC # BLD: 2.9 M/UL — LOW (ref 4.2–5.8)
RBC # BLD: 2.95 M/UL — LOW (ref 4.2–5.8)
RBC # BLD: 2.97 M/UL — LOW (ref 4.2–5.8)
RBC # BLD: 2.98 M/UL — LOW (ref 4.2–5.8)
RBC # BLD: 3.01 M/UL — LOW (ref 4.2–5.8)
RBC # BLD: 3.04 M/UL — LOW (ref 4.2–5.8)
RBC # BLD: 3.06 M/UL — LOW (ref 4.2–5.8)
RBC # BLD: 3.06 M/UL — LOW (ref 4.2–5.8)
RBC # BLD: 3.1 M/UL — LOW (ref 4.2–5.8)
RBC # BLD: 3.16 M/UL — LOW (ref 4.2–5.8)
RBC # BLD: 3.17 M/UL — LOW (ref 4.2–5.8)
RBC # BLD: 3.18 M/UL — LOW (ref 4.2–5.8)
RBC # BLD: 3.25 M/UL — LOW (ref 4.2–5.8)
RBC # BLD: 3.27 M/UL — LOW (ref 4.2–5.8)
RBC # BLD: 3.34 M/UL — LOW (ref 4.2–5.8)
RBC # BLD: 3.64 M/UL — LOW (ref 4.2–5.8)
RBC # BLD: 3.65 M/UL — LOW (ref 4.2–5.8)
RBC # BLD: 3.73 M/UL — LOW (ref 4.2–5.8)
RBC # BLD: 3.74 M/UL — LOW (ref 4.2–5.8)
RBC # BLD: 3.75 M/UL — LOW (ref 4.2–5.8)
RBC # BLD: 3.82 M/UL — LOW (ref 4.2–5.8)
RBC # BLD: 3.84 M/UL — LOW (ref 4.2–5.8)
RBC # BLD: 3.84 M/UL — LOW (ref 4.2–5.8)
RBC # BLD: 3.89 M/UL — LOW (ref 4.2–5.8)
RBC # BLD: 3.94 M/UL — LOW (ref 4.2–5.8)
RBC # BLD: 3.95 M/UL — LOW (ref 4.2–5.8)
RBC # BLD: 4.04 M/UL — LOW (ref 4.2–5.8)
RBC # BLD: 4.19 M/UL — LOW (ref 4.2–5.8)
RBC # BLD: 4.42 M/UL — SIGNIFICANT CHANGE UP (ref 4.2–5.8)
RBC # FLD: 17.2 % — HIGH (ref 10.3–14.5)
RBC # FLD: 17.2 % — HIGH (ref 10.3–14.5)
RBC # FLD: 17.4 % — HIGH (ref 10.3–14.5)
RBC # FLD: 17.4 % — HIGH (ref 10.3–14.5)
RBC # FLD: 17.5 % — HIGH (ref 10.3–14.5)
RBC # FLD: 17.6 % — HIGH (ref 10.3–14.5)
RBC # FLD: 17.7 % — HIGH (ref 10.3–14.5)
RBC # FLD: 17.7 % — HIGH (ref 10.3–14.5)
RBC # FLD: 17.8 % — HIGH (ref 10.3–14.5)
RBC # FLD: 17.9 % — HIGH (ref 10.3–14.5)
RBC # FLD: 17.9 % — HIGH (ref 10.3–14.5)
RBC # FLD: 18 % — HIGH (ref 10.3–14.5)
RBC # FLD: 18 % — HIGH (ref 10.3–14.5)
RBC # FLD: 18.2 % — HIGH (ref 10.3–14.5)
RBC # FLD: 18.3 % — HIGH (ref 10.3–14.5)
RBC # FLD: 18.4 % — HIGH (ref 10.3–14.5)
RBC # FLD: 18.4 % — HIGH (ref 10.3–14.5)
RBC # FLD: 18.5 % — HIGH (ref 10.3–14.5)
RBC # FLD: 18.6 % — HIGH (ref 10.3–14.5)
RBC # FLD: 18.8 % — HIGH (ref 10.3–14.5)
RBC # FLD: 18.9 % — HIGH (ref 10.3–14.5)
RBC # FLD: 19 % — HIGH (ref 10.3–14.5)
RBC # FLD: 21.3 % — HIGH (ref 10.3–14.5)
RBC # FLD: 21.3 % — HIGH (ref 10.3–14.5)
RBC # FLD: 21.5 % — HIGH (ref 10.3–14.5)
RBC # FLD: 22 % — HIGH (ref 10.3–14.5)
RBC # FLD: 22 % — HIGH (ref 10.3–14.5)
RBC # FLD: 22.3 % — HIGH (ref 10.3–14.5)
RBC # FLD: 22.4 % — HIGH (ref 10.3–14.5)
RBC CASTS # UR COMP ASSIST: SIGNIFICANT CHANGE UP (ref 0–?)
RH IG SCN BLD-IMP: POSITIVE — SIGNIFICANT CHANGE UP
SAO2 % BLDV: 93.5 % — HIGH (ref 60–85)
SARS-COV-2 RNA SPEC QL NAA+PROBE: SIGNIFICANT CHANGE UP
SARS-COV-2 RNA SPEC QL NAA+PROBE: SIGNIFICANT CHANGE UP
SODIUM SERPL-SCNC: 128 MMOL/L — LOW (ref 135–145)
SODIUM SERPL-SCNC: 132 MMOL/L — LOW (ref 135–145)
SODIUM SERPL-SCNC: 133 MMOL/L — LOW (ref 135–145)
SODIUM SERPL-SCNC: 133 MMOL/L — LOW (ref 135–145)
SODIUM SERPL-SCNC: 135 MMOL/L — SIGNIFICANT CHANGE UP (ref 135–145)
SODIUM SERPL-SCNC: 135 MMOL/L — SIGNIFICANT CHANGE UP (ref 135–145)
SODIUM SERPL-SCNC: 136 MMOL/L — SIGNIFICANT CHANGE UP (ref 135–145)
SODIUM SERPL-SCNC: 137 MMOL/L — SIGNIFICANT CHANGE UP (ref 135–145)
SODIUM SERPL-SCNC: 138 MMOL/L — SIGNIFICANT CHANGE UP (ref 135–145)
SODIUM SERPL-SCNC: 139 MMOL/L — SIGNIFICANT CHANGE UP (ref 135–145)
SODIUM SERPL-SCNC: 140 MMOL/L — SIGNIFICANT CHANGE UP (ref 135–145)
SODIUM SERPL-SCNC: 141 MMOL/L — SIGNIFICANT CHANGE UP (ref 135–145)
SODIUM SERPL-SCNC: 141 MMOL/L — SIGNIFICANT CHANGE UP (ref 135–145)
SODIUM SERPL-SCNC: 142 MMOL/L — SIGNIFICANT CHANGE UP (ref 135–145)
SODIUM SERPL-SCNC: 146 MMOL/L — HIGH (ref 135–145)
SODIUM UR-SCNC: 20 MMOL/L — SIGNIFICANT CHANGE UP
SP GR SPEC: 1.02 — SIGNIFICANT CHANGE UP (ref 1–1.04)
SQUAMOUS # UR AUTO: SIGNIFICANT CHANGE UP
T3 SERPL-MCNC: 63.4 NG/DL — LOW (ref 80–200)
T4 AB SER-ACNC: 8.89 UG/DL — SIGNIFICANT CHANGE UP (ref 5.1–13)
T4 FREE SERPL-MCNC: 1.55 NG/DL — SIGNIFICANT CHANGE UP (ref 0.9–1.8)
TRIGL SERPL-MCNC: 108 MG/DL — SIGNIFICANT CHANGE UP (ref 10–149)
TROPONIN T, HIGH SENSITIVITY: 129 NG/L — CRITICAL HIGH (ref ?–14)
TROPONIN T, HIGH SENSITIVITY: 129 NG/L — CRITICAL HIGH (ref ?–14)
TROPONIN T, HIGH SENSITIVITY: 144 NG/L — CRITICAL HIGH (ref ?–14)
TROPONIN T, HIGH SENSITIVITY: 160 NG/L — CRITICAL HIGH (ref ?–14)
TROPONIN T, HIGH SENSITIVITY: 175 NG/L — CRITICAL HIGH (ref ?–14)
TROPONIN T, HIGH SENSITIVITY: 177 NG/L — CRITICAL HIGH (ref ?–14)
TROPONIN T, HIGH SENSITIVITY: 180 NG/L — CRITICAL HIGH (ref ?–14)
TROPONIN T, HIGH SENSITIVITY: 182 NG/L — CRITICAL HIGH (ref ?–14)
TROPONIN T, HIGH SENSITIVITY: 195 NG/L — CRITICAL HIGH (ref ?–14)
TROPONIN T, HIGH SENSITIVITY: 65 NG/L — CRITICAL HIGH (ref ?–14)
TROPONIN T, HIGH SENSITIVITY: 78 NG/L — CRITICAL HIGH (ref ?–14)
TROPONIN T, HIGH SENSITIVITY: 93 NG/L — CRITICAL HIGH (ref ?–14)
TSH SERPL-MCNC: 3.63 UIU/ML — SIGNIFICANT CHANGE UP (ref 0.27–4.2)
UIBC SERPL-MCNC: 276.3 UG/DL — SIGNIFICANT CHANGE UP (ref 110–370)
UROBILINOGEN FLD QL: NORMAL — SIGNIFICANT CHANGE UP
VIT B12 SERPL-MCNC: 326 PG/ML — SIGNIFICANT CHANGE UP (ref 200–900)
WBC # BLD: 5.16 K/UL — SIGNIFICANT CHANGE UP (ref 3.8–10.5)
WBC # BLD: 5.19 K/UL — SIGNIFICANT CHANGE UP (ref 3.8–10.5)
WBC # BLD: 5.5 K/UL — SIGNIFICANT CHANGE UP (ref 3.8–10.5)
WBC # BLD: 5.9 K/UL — SIGNIFICANT CHANGE UP (ref 3.8–10.5)
WBC # BLD: 5.97 K/UL — SIGNIFICANT CHANGE UP (ref 3.8–10.5)
WBC # BLD: 6 K/UL — SIGNIFICANT CHANGE UP (ref 3.8–10.5)
WBC # BLD: 6.08 K/UL — SIGNIFICANT CHANGE UP (ref 3.8–10.5)
WBC # BLD: 6.09 K/UL — SIGNIFICANT CHANGE UP (ref 3.8–10.5)
WBC # BLD: 6.15 K/UL — SIGNIFICANT CHANGE UP (ref 3.8–10.5)
WBC # BLD: 6.24 K/UL — SIGNIFICANT CHANGE UP (ref 3.8–10.5)
WBC # BLD: 6.25 K/UL — SIGNIFICANT CHANGE UP (ref 3.8–10.5)
WBC # BLD: 6.4 K/UL — SIGNIFICANT CHANGE UP (ref 3.8–10.5)
WBC # BLD: 6.47 K/UL — SIGNIFICANT CHANGE UP (ref 3.8–10.5)
WBC # BLD: 6.69 K/UL — SIGNIFICANT CHANGE UP (ref 3.8–10.5)
WBC # BLD: 6.79 K/UL — SIGNIFICANT CHANGE UP (ref 3.8–10.5)
WBC # BLD: 7.01 K/UL — SIGNIFICANT CHANGE UP (ref 3.8–10.5)
WBC # BLD: 7.02 K/UL — SIGNIFICANT CHANGE UP (ref 3.8–10.5)
WBC # BLD: 7.1 K/UL — SIGNIFICANT CHANGE UP (ref 3.8–10.5)
WBC # BLD: 7.15 K/UL — SIGNIFICANT CHANGE UP (ref 3.8–10.5)
WBC # BLD: 7.2 K/UL — SIGNIFICANT CHANGE UP (ref 3.8–10.5)
WBC # BLD: 7.29 K/UL — SIGNIFICANT CHANGE UP (ref 3.8–10.5)
WBC # BLD: 7.31 K/UL — SIGNIFICANT CHANGE UP (ref 3.8–10.5)
WBC # BLD: 7.32 K/UL — SIGNIFICANT CHANGE UP (ref 3.8–10.5)
WBC # BLD: 7.52 K/UL — SIGNIFICANT CHANGE UP (ref 3.8–10.5)
WBC # BLD: 7.54 K/UL — SIGNIFICANT CHANGE UP (ref 3.8–10.5)
WBC # BLD: 7.72 K/UL — SIGNIFICANT CHANGE UP (ref 3.8–10.5)
WBC # BLD: 7.75 K/UL — SIGNIFICANT CHANGE UP (ref 3.8–10.5)
WBC # BLD: 7.79 K/UL — SIGNIFICANT CHANGE UP (ref 3.8–10.5)
WBC # BLD: 7.79 K/UL — SIGNIFICANT CHANGE UP (ref 3.8–10.5)
WBC # BLD: 8 K/UL — SIGNIFICANT CHANGE UP (ref 3.8–10.5)
WBC # BLD: 8.09 K/UL — SIGNIFICANT CHANGE UP (ref 3.8–10.5)
WBC # BLD: 8.25 K/UL — SIGNIFICANT CHANGE UP (ref 3.8–10.5)
WBC # BLD: 8.63 K/UL — SIGNIFICANT CHANGE UP (ref 3.8–10.5)
WBC # BLD: 8.75 K/UL — SIGNIFICANT CHANGE UP (ref 3.8–10.5)
WBC # BLD: 9.02 K/UL — SIGNIFICANT CHANGE UP (ref 3.8–10.5)
WBC # BLD: 9.05 K/UL — SIGNIFICANT CHANGE UP (ref 3.8–10.5)
WBC # FLD AUTO: 5.16 K/UL — SIGNIFICANT CHANGE UP (ref 3.8–10.5)
WBC # FLD AUTO: 5.19 K/UL — SIGNIFICANT CHANGE UP (ref 3.8–10.5)
WBC # FLD AUTO: 5.5 K/UL — SIGNIFICANT CHANGE UP (ref 3.8–10.5)
WBC # FLD AUTO: 5.9 K/UL — SIGNIFICANT CHANGE UP (ref 3.8–10.5)
WBC # FLD AUTO: 5.97 K/UL — SIGNIFICANT CHANGE UP (ref 3.8–10.5)
WBC # FLD AUTO: 6 K/UL — SIGNIFICANT CHANGE UP (ref 3.8–10.5)
WBC # FLD AUTO: 6.08 K/UL — SIGNIFICANT CHANGE UP (ref 3.8–10.5)
WBC # FLD AUTO: 6.09 K/UL — SIGNIFICANT CHANGE UP (ref 3.8–10.5)
WBC # FLD AUTO: 6.15 K/UL — SIGNIFICANT CHANGE UP (ref 3.8–10.5)
WBC # FLD AUTO: 6.24 K/UL — SIGNIFICANT CHANGE UP (ref 3.8–10.5)
WBC # FLD AUTO: 6.25 K/UL — SIGNIFICANT CHANGE UP (ref 3.8–10.5)
WBC # FLD AUTO: 6.4 K/UL — SIGNIFICANT CHANGE UP (ref 3.8–10.5)
WBC # FLD AUTO: 6.47 K/UL — SIGNIFICANT CHANGE UP (ref 3.8–10.5)
WBC # FLD AUTO: 6.69 K/UL — SIGNIFICANT CHANGE UP (ref 3.8–10.5)
WBC # FLD AUTO: 6.79 K/UL — SIGNIFICANT CHANGE UP (ref 3.8–10.5)
WBC # FLD AUTO: 7.01 K/UL — SIGNIFICANT CHANGE UP (ref 3.8–10.5)
WBC # FLD AUTO: 7.02 K/UL — SIGNIFICANT CHANGE UP (ref 3.8–10.5)
WBC # FLD AUTO: 7.1 K/UL — SIGNIFICANT CHANGE UP (ref 3.8–10.5)
WBC # FLD AUTO: 7.15 K/UL — SIGNIFICANT CHANGE UP (ref 3.8–10.5)
WBC # FLD AUTO: 7.2 K/UL — SIGNIFICANT CHANGE UP (ref 3.8–10.5)
WBC # FLD AUTO: 7.29 K/UL — SIGNIFICANT CHANGE UP (ref 3.8–10.5)
WBC # FLD AUTO: 7.31 K/UL — SIGNIFICANT CHANGE UP (ref 3.8–10.5)
WBC # FLD AUTO: 7.32 K/UL — SIGNIFICANT CHANGE UP (ref 3.8–10.5)
WBC # FLD AUTO: 7.52 K/UL — SIGNIFICANT CHANGE UP (ref 3.8–10.5)
WBC # FLD AUTO: 7.54 K/UL — SIGNIFICANT CHANGE UP (ref 3.8–10.5)
WBC # FLD AUTO: 7.72 K/UL — SIGNIFICANT CHANGE UP (ref 3.8–10.5)
WBC # FLD AUTO: 7.75 K/UL — SIGNIFICANT CHANGE UP (ref 3.8–10.5)
WBC # FLD AUTO: 7.79 K/UL — SIGNIFICANT CHANGE UP (ref 3.8–10.5)
WBC # FLD AUTO: 7.79 K/UL — SIGNIFICANT CHANGE UP (ref 3.8–10.5)
WBC # FLD AUTO: 8 K/UL — SIGNIFICANT CHANGE UP (ref 3.8–10.5)
WBC # FLD AUTO: 8.09 K/UL — SIGNIFICANT CHANGE UP (ref 3.8–10.5)
WBC # FLD AUTO: 8.25 K/UL — SIGNIFICANT CHANGE UP (ref 3.8–10.5)
WBC # FLD AUTO: 8.63 K/UL — SIGNIFICANT CHANGE UP (ref 3.8–10.5)
WBC # FLD AUTO: 8.75 K/UL — SIGNIFICANT CHANGE UP (ref 3.8–10.5)
WBC # FLD AUTO: 9.02 K/UL — SIGNIFICANT CHANGE UP (ref 3.8–10.5)
WBC # FLD AUTO: 9.05 K/UL — SIGNIFICANT CHANGE UP (ref 3.8–10.5)
WBC UR QL: SIGNIFICANT CHANGE UP (ref 0–?)

## 2020-01-01 PROCEDURE — 99222 1ST HOSP IP/OBS MODERATE 55: CPT | Mod: GC

## 2020-01-01 PROCEDURE — 33208 INSRT HEART PM ATRIAL & VENT: CPT | Mod: KX

## 2020-01-01 PROCEDURE — 71045 X-RAY EXAM CHEST 1 VIEW: CPT | Mod: 26

## 2020-01-01 PROCEDURE — 71046 X-RAY EXAM CHEST 2 VIEWS: CPT | Mod: 26

## 2020-01-01 PROCEDURE — 93010 ELECTROCARDIOGRAM REPORT: CPT

## 2020-01-01 PROCEDURE — 99231 SBSQ HOSP IP/OBS SF/LOW 25: CPT

## 2020-01-01 PROCEDURE — 99223 1ST HOSP IP/OBS HIGH 75: CPT

## 2020-01-01 PROCEDURE — 99285 EMERGENCY DEPT VISIT HI MDM: CPT

## 2020-01-01 PROCEDURE — 93280 PM DEVICE PROGR EVAL DUAL: CPT | Mod: 26

## 2020-01-01 PROCEDURE — 99223 1ST HOSP IP/OBS HIGH 75: CPT | Mod: 57

## 2020-01-01 PROCEDURE — 99233 SBSQ HOSP IP/OBS HIGH 50: CPT

## 2020-01-01 PROCEDURE — 99232 SBSQ HOSP IP/OBS MODERATE 35: CPT

## 2020-01-01 PROCEDURE — 90792 PSYCH DIAG EVAL W/MED SRVCS: CPT

## 2020-01-01 PROCEDURE — 99349 HOME/RES VST EST MOD MDM 40: CPT

## 2020-01-01 PROCEDURE — 93306 TTE W/DOPPLER COMPLETE: CPT | Mod: 26

## 2020-01-01 PROCEDURE — 76770 US EXAM ABDO BACK WALL COMP: CPT | Mod: 26

## 2020-01-01 PROCEDURE — 99497 ADVNCD CARE PLAN 30 MIN: CPT | Mod: 25

## 2020-01-01 PROCEDURE — 99024 POSTOP FOLLOW-UP VISIT: CPT

## 2020-01-01 PROCEDURE — 99222 1ST HOSP IP/OBS MODERATE 55: CPT

## 2020-01-01 RX ORDER — INSULIN GLARGINE 100 [IU]/ML
3 INJECTION, SOLUTION SUBCUTANEOUS AT BEDTIME
Refills: 0 | Status: DISCONTINUED | OUTPATIENT
Start: 2020-01-01 | End: 2020-01-01

## 2020-01-01 RX ORDER — SIMVASTATIN 20 MG/1
40 TABLET, FILM COATED ORAL AT BEDTIME
Refills: 0 | Status: DISCONTINUED | OUTPATIENT
Start: 2020-01-01 | End: 2020-01-01

## 2020-01-01 RX ORDER — DEXTROSE 50 % IN WATER 50 %
15 SYRINGE (ML) INTRAVENOUS ONCE
Refills: 0 | Status: DISCONTINUED | OUTPATIENT
Start: 2020-01-01 | End: 2020-01-01

## 2020-01-01 RX ORDER — OLANZAPINE 15 MG/1
10 TABLET, FILM COATED ORAL ONCE
Refills: 0 | Status: COMPLETED | OUTPATIENT
Start: 2020-01-01 | End: 2020-01-01

## 2020-01-01 RX ORDER — GLUCAGON INJECTION, SOLUTION 0.5 MG/.1ML
1 INJECTION, SOLUTION SUBCUTANEOUS ONCE
Refills: 0 | Status: DISCONTINUED | OUTPATIENT
Start: 2020-01-01 | End: 2020-01-01

## 2020-01-01 RX ORDER — FUROSEMIDE 40 MG
20 TABLET ORAL ONCE
Refills: 0 | Status: COMPLETED | OUTPATIENT
Start: 2020-01-01 | End: 2020-01-01

## 2020-01-01 RX ORDER — HEPARIN SODIUM 5000 [USP'U]/ML
4000 INJECTION INTRAVENOUS; SUBCUTANEOUS EVERY 6 HOURS
Refills: 0 | Status: DISCONTINUED | OUTPATIENT
Start: 2020-01-01 | End: 2020-01-01

## 2020-01-01 RX ORDER — HALOPERIDOL DECANOATE 100 MG/ML
2.5 INJECTION INTRAMUSCULAR ONCE
Refills: 0 | Status: COMPLETED | OUTPATIENT
Start: 2020-01-01 | End: 2020-01-01

## 2020-01-01 RX ORDER — LANOLIN ALCOHOL/MO/W.PET/CERES
3 CREAM (GRAM) TOPICAL AT BEDTIME
Refills: 0 | Status: COMPLETED | OUTPATIENT
Start: 2020-01-01 | End: 2020-01-01

## 2020-01-01 RX ORDER — RANOLAZINE 500 MG/1
1 TABLET, FILM COATED, EXTENDED RELEASE ORAL
Qty: 60 | Refills: 0
Start: 2020-01-01 | End: 2020-07-18

## 2020-01-01 RX ORDER — POTASSIUM PHOSPHATE, MONOBASIC POTASSIUM PHOSPHATE, DIBASIC 236; 224 MG/ML; MG/ML
15 INJECTION, SOLUTION INTRAVENOUS ONCE
Refills: 0 | Status: COMPLETED | OUTPATIENT
Start: 2020-01-01 | End: 2020-01-01

## 2020-01-01 RX ORDER — INSULIN LISPRO 100/ML
VIAL (ML) SUBCUTANEOUS AT BEDTIME
Refills: 0 | Status: DISCONTINUED | OUTPATIENT
Start: 2020-01-01 | End: 2020-01-01

## 2020-01-01 RX ORDER — FUROSEMIDE 40 MG
80 TABLET ORAL
Refills: 0 | Status: DISCONTINUED | OUTPATIENT
Start: 2020-01-01 | End: 2020-01-01

## 2020-01-01 RX ORDER — METOPROLOL TARTRATE 50 MG
25 TABLET ORAL DAILY
Refills: 0 | Status: DISCONTINUED | OUTPATIENT
Start: 2020-01-01 | End: 2020-01-01

## 2020-01-01 RX ORDER — METOPROLOL TARTRATE 50 MG
1 TABLET ORAL
Qty: 60 | Refills: 0
Start: 2020-01-01 | End: 2020-07-07

## 2020-01-01 RX ORDER — CHOLECALCIFEROL (VITAMIN D3) 125 MCG
1000 CAPSULE ORAL DAILY
Refills: 0 | Status: DISCONTINUED | OUTPATIENT
Start: 2020-01-01 | End: 2020-01-01

## 2020-01-01 RX ORDER — ERYTHROPOIETIN 10000 [IU]/ML
10000 INJECTION, SOLUTION INTRAVENOUS; SUBCUTANEOUS ONCE
Refills: 0 | Status: COMPLETED | OUTPATIENT
Start: 2020-01-01 | End: 2020-01-01

## 2020-01-01 RX ORDER — PANTOPRAZOLE SODIUM 20 MG/1
1 TABLET, DELAYED RELEASE ORAL
Qty: 30 | Refills: 0
Start: 2020-01-01 | End: 2020-07-07

## 2020-01-01 RX ORDER — DEXTROSE 50 % IN WATER 50 %
25 SYRINGE (ML) INTRAVENOUS ONCE
Refills: 0 | Status: DISCONTINUED | OUTPATIENT
Start: 2020-01-01 | End: 2020-01-01

## 2020-01-01 RX ORDER — PANTOPRAZOLE SODIUM 20 MG/1
40 TABLET, DELAYED RELEASE ORAL
Refills: 0 | Status: DISCONTINUED | OUTPATIENT
Start: 2020-01-01 | End: 2020-01-01

## 2020-01-01 RX ORDER — DOPAMINE HYDROCHLORIDE 40 MG/ML
10 INJECTION, SOLUTION, CONCENTRATE INTRAVENOUS
Qty: 400 | Refills: 0 | Status: DISCONTINUED | OUTPATIENT
Start: 2020-01-01 | End: 2020-01-01

## 2020-01-01 RX ORDER — ACETAMINOPHEN 500 MG
650 TABLET ORAL EVERY 6 HOURS
Refills: 0 | Status: DISCONTINUED | OUTPATIENT
Start: 2020-01-01 | End: 2020-01-01

## 2020-01-01 RX ORDER — POLYETHYLENE GLYCOL 3350 17 G/17G
17 POWDER, FOR SOLUTION ORAL DAILY
Refills: 0 | Status: DISCONTINUED | OUTPATIENT
Start: 2020-01-01 | End: 2020-01-01

## 2020-01-01 RX ORDER — ASPIRIN/CALCIUM CARB/MAGNESIUM 324 MG
243 TABLET ORAL ONCE
Refills: 0 | Status: COMPLETED | OUTPATIENT
Start: 2020-01-01 | End: 2020-01-01

## 2020-01-01 RX ORDER — ASPIRIN/CALCIUM CARB/MAGNESIUM 324 MG
81 TABLET ORAL DAILY
Refills: 0 | Status: DISCONTINUED | OUTPATIENT
Start: 2020-01-01 | End: 2020-01-01

## 2020-01-01 RX ORDER — TICAGRELOR 90 MG/1
180 TABLET ORAL ONCE
Refills: 0 | Status: COMPLETED | OUTPATIENT
Start: 2020-01-01 | End: 2020-01-01

## 2020-01-01 RX ORDER — ERYTHROPOIETIN 10000 [IU]/ML
10000 INJECTION, SOLUTION INTRAVENOUS; SUBCUTANEOUS ONCE
Refills: 0 | Status: DISCONTINUED | OUTPATIENT
Start: 2020-01-01 | End: 2020-01-01

## 2020-01-01 RX ORDER — CEFAZOLIN SODIUM 1 G
2000 VIAL (EA) INJECTION EVERY 8 HOURS
Refills: 0 | Status: COMPLETED | OUTPATIENT
Start: 2020-01-01 | End: 2020-01-01

## 2020-01-01 RX ORDER — MAGNESIUM SULFATE 500 MG/ML
1 VIAL (ML) INJECTION ONCE
Refills: 0 | Status: COMPLETED | OUTPATIENT
Start: 2020-01-01 | End: 2020-01-01

## 2020-01-01 RX ORDER — POLYETHYLENE GLYCOL 3350 17 G/17G
17 POWDER, FOR SOLUTION ORAL
Qty: 0 | Refills: 0 | DISCHARGE
Start: 2020-01-01

## 2020-01-01 RX ORDER — CLOPIDOGREL BISULFATE 75 MG/1
75 TABLET, FILM COATED ORAL DAILY
Refills: 0 | Status: DISCONTINUED | OUTPATIENT
Start: 2020-01-01 | End: 2020-01-01

## 2020-01-01 RX ORDER — INSULIN GLARGINE 100 [IU]/ML
6 INJECTION, SOLUTION SUBCUTANEOUS AT BEDTIME
Refills: 0 | Status: DISCONTINUED | OUTPATIENT
Start: 2020-01-01 | End: 2020-01-01

## 2020-01-01 RX ORDER — SENNA PLUS 8.6 MG/1
2 TABLET ORAL
Qty: 0 | Refills: 0 | DISCHARGE
Start: 2020-01-01

## 2020-01-01 RX ORDER — MAGNESIUM OXIDE 400 MG ORAL TABLET 241.3 MG
400 TABLET ORAL
Refills: 0 | Status: COMPLETED | OUTPATIENT
Start: 2020-01-01 | End: 2020-01-01

## 2020-01-01 RX ORDER — SACUBITRIL AND VALSARTAN 24; 26 MG/1; MG/1
1 TABLET, FILM COATED ORAL
Refills: 0 | Status: DISCONTINUED | OUTPATIENT
Start: 2020-01-01 | End: 2020-01-01

## 2020-01-01 RX ORDER — FUROSEMIDE 40 MG
1 TABLET ORAL
Qty: 30 | Refills: 0
Start: 2020-01-01 | End: 2020-07-07

## 2020-01-01 RX ORDER — FUROSEMIDE 40 MG
40 TABLET ORAL ONCE
Refills: 0 | Status: COMPLETED | OUTPATIENT
Start: 2020-01-01 | End: 2020-01-01

## 2020-01-01 RX ORDER — ISOSORBIDE MONONITRATE 60 MG/1
1 TABLET, EXTENDED RELEASE ORAL
Qty: 30 | Refills: 0
Start: 2020-01-01 | End: 2020-07-07

## 2020-01-01 RX ORDER — METFORMIN HYDROCHLORIDE 850 MG/1
1 TABLET ORAL
Qty: 60 | Refills: 0
Start: 2020-01-01 | End: 2020-07-07

## 2020-01-01 RX ORDER — DEXTROSE 50 % IN WATER 50 %
12.5 SYRINGE (ML) INTRAVENOUS ONCE
Refills: 0 | Status: DISCONTINUED | OUTPATIENT
Start: 2020-01-01 | End: 2020-01-01

## 2020-01-01 RX ORDER — FUROSEMIDE 40 MG
0.5 TABLET ORAL
Qty: 15 | Refills: 0
Start: 2020-01-01 | End: 2020-07-19

## 2020-01-01 RX ORDER — FOLIC ACID 1 MG/1
1 TABLET ORAL DAILY
Refills: 0 | Status: ACTIVE | COMMUNITY
Start: 2020-01-01

## 2020-01-01 RX ORDER — FUROSEMIDE 40 MG
40 TABLET ORAL ONCE
Refills: 0 | Status: DISCONTINUED | OUTPATIENT
Start: 2020-01-01 | End: 2020-01-01

## 2020-01-01 RX ORDER — RANOLAZINE 500 MG/1
1000 TABLET, FILM COATED, EXTENDED RELEASE ORAL
Refills: 0 | Status: DISCONTINUED | OUTPATIENT
Start: 2020-01-01 | End: 2020-01-01

## 2020-01-01 RX ORDER — NITROGLYCERIN 6.5 MG
0.4 CAPSULE, EXTENDED RELEASE ORAL
Refills: 0 | Status: DISCONTINUED | OUTPATIENT
Start: 2020-01-01 | End: 2020-01-01

## 2020-01-01 RX ORDER — ESCITALOPRAM OXALATE 10 MG/1
1 TABLET, FILM COATED ORAL
Qty: 0 | Refills: 0 | DISCHARGE

## 2020-01-01 RX ORDER — INSULIN LISPRO 100/ML
2 VIAL (ML) SUBCUTANEOUS
Refills: 0 | Status: DISCONTINUED | OUTPATIENT
Start: 2020-01-01 | End: 2020-01-01

## 2020-01-01 RX ORDER — NITROGLYCERIN 6.5 MG
0.4 CAPSULE, EXTENDED RELEASE ORAL ONCE
Refills: 0 | Status: COMPLETED | OUTPATIENT
Start: 2020-01-01 | End: 2020-01-01

## 2020-01-01 RX ORDER — HEPARIN SODIUM 5000 [USP'U]/ML
4100 INJECTION INTRAVENOUS; SUBCUTANEOUS EVERY 6 HOURS
Refills: 0 | Status: DISCONTINUED | OUTPATIENT
Start: 2020-01-01 | End: 2020-01-01

## 2020-01-01 RX ORDER — ASPIRIN/CALCIUM CARB/MAGNESIUM 324 MG
162 TABLET ORAL ONCE
Refills: 0 | Status: DISCONTINUED | OUTPATIENT
Start: 2020-01-01 | End: 2020-01-01

## 2020-01-01 RX ORDER — SODIUM CHLORIDE 9 MG/ML
1000 INJECTION, SOLUTION INTRAVENOUS
Refills: 0 | Status: DISCONTINUED | OUTPATIENT
Start: 2020-01-01 | End: 2020-01-01

## 2020-01-01 RX ORDER — METOPROLOL TARTRATE 50 MG
12.5 TABLET ORAL
Refills: 0 | Status: DISCONTINUED | OUTPATIENT
Start: 2020-01-01 | End: 2020-01-01

## 2020-01-01 RX ORDER — NITROGLYCERIN 6.5 MG
0.4 CAPSULE, EXTENDED RELEASE ORAL
Refills: 0 | Status: COMPLETED | OUTPATIENT
Start: 2020-01-01 | End: 2020-01-01

## 2020-01-01 RX ORDER — INSULIN GLARGINE 100 [IU]/ML
4 INJECTION, SOLUTION SUBCUTANEOUS AT BEDTIME
Refills: 0 | Status: DISCONTINUED | OUTPATIENT
Start: 2020-01-01 | End: 2020-01-01

## 2020-01-01 RX ORDER — HEPARIN SODIUM 5000 [USP'U]/ML
INJECTION INTRAVENOUS; SUBCUTANEOUS
Qty: 25000 | Refills: 0 | Status: DISCONTINUED | OUTPATIENT
Start: 2020-01-01 | End: 2020-01-01

## 2020-01-01 RX ORDER — METOPROLOL SUCCINATE 25 MG/1
25 TABLET, EXTENDED RELEASE ORAL
Qty: 15 | Refills: 0 | Status: ACTIVE | COMMUNITY
Start: 2020-01-01 | End: 1900-01-01

## 2020-01-01 RX ORDER — HEPARIN SODIUM 5000 [USP'U]/ML
4100 INJECTION INTRAVENOUS; SUBCUTANEOUS ONCE
Refills: 0 | Status: COMPLETED | OUTPATIENT
Start: 2020-01-01 | End: 2020-01-01

## 2020-01-01 RX ORDER — CHOLECALCIFEROL (VITAMIN D3) 125 MCG
125 MCG CAPSULE ORAL DAILY
Refills: 0 | Status: ACTIVE | COMMUNITY
Start: 2020-01-01

## 2020-01-01 RX ORDER — FUROSEMIDE 40 MG
1 TABLET ORAL
Qty: 13 | Refills: 0
Start: 2020-01-01 | End: 2020-01-01

## 2020-01-01 RX ORDER — POTASSIUM PHOSPHATE, MONOBASIC POTASSIUM PHOSPHATE, DIBASIC 236; 224 MG/ML; MG/ML
15 INJECTION, SOLUTION INTRAVENOUS ONCE
Refills: 0 | Status: DISCONTINUED | OUTPATIENT
Start: 2020-01-01 | End: 2020-01-01

## 2020-01-01 RX ORDER — RANOLAZINE 500 MG/1
500 TABLET, FILM COATED, EXTENDED RELEASE ORAL
Refills: 0 | Status: DISCONTINUED | OUTPATIENT
Start: 2020-01-01 | End: 2020-01-01

## 2020-01-01 RX ORDER — FUROSEMIDE 40 MG
40 TABLET ORAL
Refills: 0 | Status: DISCONTINUED | OUTPATIENT
Start: 2020-01-01 | End: 2020-01-01

## 2020-01-01 RX ORDER — FUROSEMIDE 40 MG
40 TABLET ORAL DAILY
Refills: 0 | Status: DISCONTINUED | OUTPATIENT
Start: 2020-01-01 | End: 2020-01-01

## 2020-01-01 RX ORDER — ATROPINE SULFATE 0.1 MG/ML
0.5 SYRINGE (ML) INJECTION ONCE
Refills: 0 | Status: COMPLETED | OUTPATIENT
Start: 2020-01-01 | End: 2020-01-01

## 2020-01-01 RX ORDER — METFORMIN HYDROCHLORIDE 850 MG/1
1 TABLET ORAL
Qty: 0 | Refills: 0 | DISCHARGE

## 2020-01-01 RX ORDER — HEPARIN SODIUM 5000 [USP'U]/ML
5000 INJECTION INTRAVENOUS; SUBCUTANEOUS THREE TIMES A DAY
Refills: 0 | Status: DISCONTINUED | OUTPATIENT
Start: 2020-01-01 | End: 2020-01-01

## 2020-01-01 RX ORDER — POTASSIUM CHLORIDE 20 MEQ
40 PACKET (EA) ORAL ONCE
Refills: 0 | Status: COMPLETED | OUTPATIENT
Start: 2020-01-01 | End: 2020-01-01

## 2020-01-01 RX ORDER — SENNA PLUS 8.6 MG/1
2 TABLET ORAL AT BEDTIME
Refills: 0 | Status: DISCONTINUED | OUTPATIENT
Start: 2020-01-01 | End: 2020-01-01

## 2020-01-01 RX ORDER — FUROSEMIDE 40 MG
60 TABLET ORAL
Refills: 0 | Status: DISCONTINUED | OUTPATIENT
Start: 2020-01-01 | End: 2020-01-01

## 2020-01-01 RX ORDER — METOPROLOL TARTRATE 50 MG
0.5 TABLET ORAL
Qty: 15 | Refills: 0
Start: 2020-01-01 | End: 2020-01-01

## 2020-01-01 RX ORDER — PANTOPRAZOLE SODIUM 20 MG/1
1 TABLET, DELAYED RELEASE ORAL
Qty: 30 | Refills: 0
Start: 2020-01-01 | End: 2020-01-01

## 2020-01-01 RX ORDER — IRON SUCROSE 20 MG/ML
100 INJECTION, SOLUTION INTRAVENOUS ONCE
Refills: 0 | Status: COMPLETED | OUTPATIENT
Start: 2020-01-01 | End: 2020-01-01

## 2020-01-01 RX ORDER — SIMVASTATIN 40 MG/1
40 TABLET, FILM COATED ORAL
Refills: 0 | Status: ACTIVE | COMMUNITY
Start: 2020-01-01

## 2020-01-01 RX ORDER — ISOSORBIDE MONONITRATE 60 MG/1
120 TABLET, EXTENDED RELEASE ORAL DAILY
Refills: 0 | Status: DISCONTINUED | OUTPATIENT
Start: 2020-01-01 | End: 2020-01-01

## 2020-01-01 RX ORDER — ESCITALOPRAM OXALATE 10 MG/1
5 TABLET, FILM COATED ORAL DAILY
Refills: 0 | Status: DISCONTINUED | OUTPATIENT
Start: 2020-01-01 | End: 2020-01-01

## 2020-01-01 RX ORDER — TICAGRELOR 90 MG/1
180 TABLET ORAL ONCE
Refills: 0 | Status: DISCONTINUED | OUTPATIENT
Start: 2020-01-01 | End: 2020-01-01

## 2020-01-01 RX ORDER — INSULIN LISPRO 100/ML
VIAL (ML) SUBCUTANEOUS
Refills: 0 | Status: DISCONTINUED | OUTPATIENT
Start: 2020-01-01 | End: 2020-01-01

## 2020-01-01 RX ORDER — METOPROLOL TARTRATE 50 MG
12.5 TABLET ORAL EVERY 6 HOURS
Refills: 0 | Status: DISCONTINUED | OUTPATIENT
Start: 2020-01-01 | End: 2020-01-01

## 2020-01-01 RX ORDER — HEPARIN SODIUM 5000 [USP'U]/ML
5000 INJECTION INTRAVENOUS; SUBCUTANEOUS EVERY 12 HOURS
Refills: 0 | Status: DISCONTINUED | OUTPATIENT
Start: 2020-01-01 | End: 2020-01-01

## 2020-01-01 RX ORDER — SODIUM CHLORIDE 9 MG/ML
1000 INJECTION INTRAMUSCULAR; INTRAVENOUS; SUBCUTANEOUS
Refills: 0 | Status: DISCONTINUED | OUTPATIENT
Start: 2020-01-01 | End: 2020-01-01

## 2020-01-01 RX ORDER — HEPARIN SODIUM 5000 [USP'U]/ML
4000 INJECTION INTRAVENOUS; SUBCUTANEOUS ONCE
Refills: 0 | Status: COMPLETED | OUTPATIENT
Start: 2020-01-01 | End: 2020-01-01

## 2020-01-01 RX ORDER — RANOLAZINE 500 MG/1
1 TABLET, FILM COATED, EXTENDED RELEASE ORAL
Qty: 60 | Refills: 0
Start: 2020-01-01 | End: 2020-07-07

## 2020-01-01 RX ORDER — HEPARIN SODIUM 5000 [USP'U]/ML
600 INJECTION INTRAVENOUS; SUBCUTANEOUS
Qty: 25000 | Refills: 0 | Status: DISCONTINUED | OUTPATIENT
Start: 2020-01-01 | End: 2020-01-01

## 2020-01-01 RX ORDER — METOPROLOL TARTRATE 50 MG
25 TABLET ORAL
Refills: 0 | Status: DISCONTINUED | OUTPATIENT
Start: 2020-01-01 | End: 2020-01-01

## 2020-01-01 RX ORDER — PANTOPRAZOLE 40 MG/1
40 TABLET, DELAYED RELEASE ORAL DAILY
Qty: 30 | Refills: 0 | Status: ACTIVE | COMMUNITY
Start: 2020-01-01

## 2020-01-01 RX ORDER — QUETIAPINE FUMARATE 200 MG/1
25 TABLET, FILM COATED ORAL ONCE
Refills: 0 | Status: DISCONTINUED | OUTPATIENT
Start: 2020-01-01 | End: 2020-01-01

## 2020-01-01 RX ORDER — NITROGLYCERIN 6.5 MG
0.4 CAPSULE, EXTENDED RELEASE ORAL ONCE
Refills: 0 | Status: DISCONTINUED | OUTPATIENT
Start: 2020-01-01 | End: 2020-01-01

## 2020-01-01 RX ORDER — METOPROLOL TARTRATE 50 MG
0.5 TABLET ORAL
Qty: 0 | Refills: 0 | DISCHARGE

## 2020-01-01 RX ORDER — MORPHINE SULFATE 50 MG/1
2 CAPSULE, EXTENDED RELEASE ORAL ONCE
Refills: 0 | Status: DISCONTINUED | OUTPATIENT
Start: 2020-01-01 | End: 2020-01-01

## 2020-01-01 RX ORDER — LANOLIN ALCOHOL/MO/W.PET/CERES
6 CREAM (GRAM) TOPICAL AT BEDTIME
Refills: 0 | Status: DISCONTINUED | OUTPATIENT
Start: 2020-01-01 | End: 2020-01-01

## 2020-01-01 RX ORDER — LANOLIN ALCOHOL/MO/W.PET/CERES
3 CREAM (GRAM) TOPICAL AT BEDTIME
Refills: 0 | Status: DISCONTINUED | OUTPATIENT
Start: 2020-01-01 | End: 2020-01-01

## 2020-01-01 RX ORDER — ONDANSETRON 8 MG/1
4 TABLET, FILM COATED ORAL ONCE
Refills: 0 | Status: COMPLETED | OUTPATIENT
Start: 2020-01-01 | End: 2020-01-01

## 2020-01-01 RX ORDER — FUROSEMIDE 40 MG
1 TABLET ORAL
Qty: 30 | Refills: 0
Start: 2020-01-01 | End: 2020-01-01

## 2020-01-01 RX ORDER — INSULIN LISPRO 100/ML
4 VIAL (ML) SUBCUTANEOUS
Refills: 0 | Status: DISCONTINUED | OUTPATIENT
Start: 2020-01-01 | End: 2020-01-01

## 2020-01-01 RX ORDER — ISOSORBIDE DINITRATE 5 MG/1
10 TABLET ORAL THREE TIMES A DAY
Refills: 0 | Status: DISCONTINUED | OUTPATIENT
Start: 2020-01-01 | End: 2020-01-01

## 2020-01-01 RX ORDER — FUROSEMIDE 40 MG/1
40 TABLET ORAL
Refills: 0 | Status: ACTIVE | COMMUNITY
Start: 2020-01-01

## 2020-01-01 RX ORDER — FUROSEMIDE 40 MG
40 TABLET ORAL ONCE
Refills: 0 | Status: COMPLETED | OUTPATIENT
Start: 2020-01-01

## 2020-01-01 RX ORDER — FOLIC ACID 0.8 MG
1 TABLET ORAL DAILY
Refills: 0 | Status: DISCONTINUED | OUTPATIENT
Start: 2020-01-01 | End: 2020-01-01

## 2020-01-01 RX ORDER — ISOSORBIDE MONONITRATE 120 MG/1
120 TABLET, EXTENDED RELEASE ORAL DAILY
Refills: 0 | Status: ACTIVE | COMMUNITY
Start: 2020-01-01

## 2020-01-01 RX ORDER — PANTOPRAZOLE SODIUM 20 MG/1
40 TABLET, DELAYED RELEASE ORAL AT BEDTIME
Refills: 0 | Status: DISCONTINUED | OUTPATIENT
Start: 2020-01-01 | End: 2020-01-01

## 2020-01-01 RX ORDER — FUROSEMIDE 40 MG
80 TABLET ORAL ONCE
Refills: 0 | Status: COMPLETED | OUTPATIENT
Start: 2020-01-01 | End: 2020-01-01

## 2020-01-01 RX ORDER — CLOPIDOGREL BISULFATE 75 MG/1
1 TABLET, FILM COATED ORAL
Qty: 30 | Refills: 0
Start: 2020-01-01 | End: 2020-07-07

## 2020-01-01 RX ORDER — LANOLIN ALCOHOL/MO/W.PET/CERES
3 CREAM (GRAM) TOPICAL ONCE
Refills: 0 | Status: COMPLETED | OUTPATIENT
Start: 2020-01-01 | End: 2020-01-01

## 2020-01-01 RX ORDER — ONDANSETRON 8 MG/1
4 TABLET, FILM COATED ORAL EVERY 8 HOURS
Refills: 0 | Status: DISCONTINUED | OUTPATIENT
Start: 2020-01-01 | End: 2020-01-01

## 2020-01-01 RX ORDER — LEVALBUTEROL 1.25 MG/.5ML
0.63 SOLUTION, CONCENTRATE RESPIRATORY (INHALATION) ONCE
Refills: 0 | Status: COMPLETED | OUTPATIENT
Start: 2020-01-01 | End: 2020-01-01

## 2020-01-01 RX ORDER — INSULIN GLARGINE 100 [IU]/ML
2 INJECTION, SOLUTION SUBCUTANEOUS AT BEDTIME
Refills: 0 | Status: DISCONTINUED | OUTPATIENT
Start: 2020-01-01 | End: 2020-01-01

## 2020-01-01 RX ORDER — SACUBITRIL AND VALSARTAN 49; 51 MG/1; MG/1
49-51 TABLET, FILM COATED ORAL TWICE DAILY
Refills: 0 | Status: ACTIVE | COMMUNITY
Start: 2020-01-01

## 2020-01-01 RX ORDER — HEPARIN SODIUM 5000 [USP'U]/ML
4000 INJECTION INTRAVENOUS; SUBCUTANEOUS ONCE
Refills: 0 | Status: DISCONTINUED | OUTPATIENT
Start: 2020-01-01 | End: 2020-01-01

## 2020-01-01 RX ORDER — SACUBITRIL AND VALSARTAN 24; 26 MG/1; MG/1
1 TABLET, FILM COATED ORAL
Qty: 60 | Refills: 0
Start: 2020-01-01 | End: 2020-01-01

## 2020-01-01 RX ORDER — SODIUM CHLORIDE 9 MG/ML
1000 INJECTION, SOLUTION INTRAVENOUS
Refills: 0 | Status: COMPLETED | OUTPATIENT
Start: 2020-01-01 | End: 2020-01-01

## 2020-01-01 RX ORDER — MORPHINE SULFATE 50 MG/1
2 CAPSULE, EXTENDED RELEASE ORAL EVERY 6 HOURS
Refills: 0 | Status: DISCONTINUED | OUTPATIENT
Start: 2020-01-01 | End: 2020-01-01

## 2020-01-01 RX ADMIN — HEPARIN SODIUM 350 UNIT(S)/HR: 5000 INJECTION INTRAVENOUS; SUBCUTANEOUS at 19:31

## 2020-01-01 RX ADMIN — PANTOPRAZOLE SODIUM 40 MILLIGRAM(S): 20 TABLET, DELAYED RELEASE ORAL at 05:21

## 2020-01-01 RX ADMIN — Medication 2: at 08:03

## 2020-01-01 RX ADMIN — Medication 3: at 12:45

## 2020-01-01 RX ADMIN — ESCITALOPRAM OXALATE 5 MILLIGRAM(S): 10 TABLET, FILM COATED ORAL at 12:13

## 2020-01-01 RX ADMIN — Medication 12.5 MILLIGRAM(S): at 17:09

## 2020-01-01 RX ADMIN — Medication 3: at 17:21

## 2020-01-01 RX ADMIN — Medication 81 MILLIGRAM(S): at 12:13

## 2020-01-01 RX ADMIN — Medication 1: at 08:48

## 2020-01-01 RX ADMIN — Medication 1: at 08:30

## 2020-01-01 RX ADMIN — ISOSORBIDE MONONITRATE 120 MILLIGRAM(S): 60 TABLET, EXTENDED RELEASE ORAL at 12:29

## 2020-01-01 RX ADMIN — Medication 2: at 22:24

## 2020-01-01 RX ADMIN — HEPARIN SODIUM 5000 UNIT(S): 5000 INJECTION INTRAVENOUS; SUBCUTANEOUS at 12:12

## 2020-01-01 RX ADMIN — ISOSORBIDE MONONITRATE 120 MILLIGRAM(S): 60 TABLET, EXTENDED RELEASE ORAL at 11:11

## 2020-01-01 RX ADMIN — Medication 1: at 08:16

## 2020-01-01 RX ADMIN — SIMVASTATIN 40 MILLIGRAM(S): 20 TABLET, FILM COATED ORAL at 22:13

## 2020-01-01 RX ADMIN — ONDANSETRON 4 MILLIGRAM(S): 8 TABLET, FILM COATED ORAL at 13:08

## 2020-01-01 RX ADMIN — SIMVASTATIN 40 MILLIGRAM(S): 20 TABLET, FILM COATED ORAL at 01:14

## 2020-01-01 RX ADMIN — Medication 25 MILLIGRAM(S): at 05:19

## 2020-01-01 RX ADMIN — SIMVASTATIN 40 MILLIGRAM(S): 20 TABLET, FILM COATED ORAL at 21:46

## 2020-01-01 RX ADMIN — Medication 1000 UNIT(S): at 11:40

## 2020-01-01 RX ADMIN — RANOLAZINE 500 MILLIGRAM(S): 500 TABLET, FILM COATED, EXTENDED RELEASE ORAL at 22:08

## 2020-01-01 RX ADMIN — RANOLAZINE 1000 MILLIGRAM(S): 500 TABLET, FILM COATED, EXTENDED RELEASE ORAL at 06:07

## 2020-01-01 RX ADMIN — Medication 1000 UNIT(S): at 13:00

## 2020-01-01 RX ADMIN — Medication 81 MILLIGRAM(S): at 18:56

## 2020-01-01 RX ADMIN — Medication 12.5 MILLIGRAM(S): at 06:07

## 2020-01-01 RX ADMIN — HEPARIN SODIUM 5000 UNIT(S): 5000 INJECTION INTRAVENOUS; SUBCUTANEOUS at 06:04

## 2020-01-01 RX ADMIN — Medication 3 MILLIGRAM(S): at 00:52

## 2020-01-01 RX ADMIN — Medication 1 MILLIGRAM(S): at 12:30

## 2020-01-01 RX ADMIN — HEPARIN SODIUM 5000 UNIT(S): 5000 INJECTION INTRAVENOUS; SUBCUTANEOUS at 22:13

## 2020-01-01 RX ADMIN — Medication 1: at 08:40

## 2020-01-01 RX ADMIN — Medication 81 MILLIGRAM(S): at 11:35

## 2020-01-01 RX ADMIN — HEPARIN SODIUM 600 UNIT(S)/HR: 5000 INJECTION INTRAVENOUS; SUBCUTANEOUS at 03:42

## 2020-01-01 RX ADMIN — HEPARIN SODIUM 5000 UNIT(S): 5000 INJECTION INTRAVENOUS; SUBCUTANEOUS at 05:36

## 2020-01-01 RX ADMIN — PANTOPRAZOLE SODIUM 40 MILLIGRAM(S): 20 TABLET, DELAYED RELEASE ORAL at 05:57

## 2020-01-01 RX ADMIN — HEPARIN SODIUM 0 UNIT(S)/HR: 5000 INJECTION INTRAVENOUS; SUBCUTANEOUS at 21:41

## 2020-01-01 RX ADMIN — Medication 81 MILLIGRAM(S): at 13:00

## 2020-01-01 RX ADMIN — HEPARIN SODIUM 5000 UNIT(S): 5000 INJECTION INTRAVENOUS; SUBCUTANEOUS at 06:53

## 2020-01-01 RX ADMIN — Medication 6 MILLIGRAM(S): at 21:19

## 2020-01-01 RX ADMIN — HEPARIN SODIUM 400 UNIT(S)/HR: 5000 INJECTION INTRAVENOUS; SUBCUTANEOUS at 12:30

## 2020-01-01 RX ADMIN — Medication 3: at 12:43

## 2020-01-01 RX ADMIN — Medication 0.4 MILLIGRAM(S): at 15:14

## 2020-01-01 RX ADMIN — Medication 12.5 MILLIGRAM(S): at 00:00

## 2020-01-01 RX ADMIN — SIMVASTATIN 40 MILLIGRAM(S): 20 TABLET, FILM COATED ORAL at 21:42

## 2020-01-01 RX ADMIN — Medication 40 MILLIGRAM(S): at 20:58

## 2020-01-01 RX ADMIN — HEPARIN SODIUM 550 UNIT(S)/HR: 5000 INJECTION INTRAVENOUS; SUBCUTANEOUS at 14:55

## 2020-01-01 RX ADMIN — PANTOPRAZOLE SODIUM 40 MILLIGRAM(S): 20 TABLET, DELAYED RELEASE ORAL at 20:57

## 2020-01-01 RX ADMIN — HEPARIN SODIUM 5000 UNIT(S): 5000 INJECTION INTRAVENOUS; SUBCUTANEOUS at 05:14

## 2020-01-01 RX ADMIN — Medication 1: at 16:57

## 2020-01-01 RX ADMIN — ISOSORBIDE MONONITRATE 120 MILLIGRAM(S): 60 TABLET, EXTENDED RELEASE ORAL at 11:40

## 2020-01-01 RX ADMIN — SIMVASTATIN 40 MILLIGRAM(S): 20 TABLET, FILM COATED ORAL at 21:44

## 2020-01-01 RX ADMIN — SACUBITRIL AND VALSARTAN 1 TABLET(S): 24; 26 TABLET, FILM COATED ORAL at 05:56

## 2020-01-01 RX ADMIN — Medication 85 MILLIMOLE(S): at 22:48

## 2020-01-01 RX ADMIN — CLOPIDOGREL BISULFATE 75 MILLIGRAM(S): 75 TABLET, FILM COATED ORAL at 14:49

## 2020-01-01 RX ADMIN — Medication 81 MILLIGRAM(S): at 11:40

## 2020-01-01 RX ADMIN — Medication 25 MILLIGRAM(S): at 05:36

## 2020-01-01 RX ADMIN — HEPARIN SODIUM 5000 UNIT(S): 5000 INJECTION INTRAVENOUS; SUBCUTANEOUS at 22:01

## 2020-01-01 RX ADMIN — ESCITALOPRAM OXALATE 5 MILLIGRAM(S): 10 TABLET, FILM COATED ORAL at 11:40

## 2020-01-01 RX ADMIN — Medication 25 MILLIGRAM(S): at 05:20

## 2020-01-01 RX ADMIN — Medication 6 MILLIGRAM(S): at 21:40

## 2020-01-01 RX ADMIN — ESCITALOPRAM OXALATE 5 MILLIGRAM(S): 10 TABLET, FILM COATED ORAL at 14:08

## 2020-01-01 RX ADMIN — SIMVASTATIN 40 MILLIGRAM(S): 20 TABLET, FILM COATED ORAL at 23:19

## 2020-01-01 RX ADMIN — RANOLAZINE 1000 MILLIGRAM(S): 500 TABLET, FILM COATED, EXTENDED RELEASE ORAL at 17:27

## 2020-01-01 RX ADMIN — Medication 81 MILLIGRAM(S): at 15:52

## 2020-01-01 RX ADMIN — Medication 2: at 17:27

## 2020-01-01 RX ADMIN — Medication 3 MILLIGRAM(S): at 00:07

## 2020-01-01 RX ADMIN — Medication 243 MILLIGRAM(S): at 19:00

## 2020-01-01 RX ADMIN — Medication 1: at 11:51

## 2020-01-01 RX ADMIN — Medication 1: at 08:24

## 2020-01-01 RX ADMIN — Medication 1: at 11:43

## 2020-01-01 RX ADMIN — Medication 2: at 17:48

## 2020-01-01 RX ADMIN — HEPARIN SODIUM 5000 UNIT(S): 5000 INJECTION INTRAVENOUS; SUBCUTANEOUS at 05:56

## 2020-01-01 RX ADMIN — Medication 1: at 17:33

## 2020-01-01 RX ADMIN — Medication 1: at 08:17

## 2020-01-01 RX ADMIN — Medication 12.5 MILLIGRAM(S): at 17:53

## 2020-01-01 RX ADMIN — Medication 25 MILLIGRAM(S): at 05:47

## 2020-01-01 RX ADMIN — SIMVASTATIN 40 MILLIGRAM(S): 20 TABLET, FILM COATED ORAL at 21:22

## 2020-01-01 RX ADMIN — Medication 100 GRAM(S): at 07:31

## 2020-01-01 RX ADMIN — RANOLAZINE 500 MILLIGRAM(S): 500 TABLET, FILM COATED, EXTENDED RELEASE ORAL at 15:51

## 2020-01-01 RX ADMIN — Medication 81 MILLIGRAM(S): at 12:48

## 2020-01-01 RX ADMIN — ERYTHROPOIETIN 10000 UNIT(S): 10000 INJECTION, SOLUTION INTRAVENOUS; SUBCUTANEOUS at 22:13

## 2020-01-01 RX ADMIN — HEPARIN SODIUM 500 UNIT(S)/HR: 5000 INJECTION INTRAVENOUS; SUBCUTANEOUS at 09:01

## 2020-01-01 RX ADMIN — PANTOPRAZOLE SODIUM 40 MILLIGRAM(S): 20 TABLET, DELAYED RELEASE ORAL at 06:54

## 2020-01-01 RX ADMIN — HEPARIN SODIUM 4000 UNIT(S): 5000 INJECTION INTRAVENOUS; SUBCUTANEOUS at 10:24

## 2020-01-01 RX ADMIN — PANTOPRAZOLE SODIUM 40 MILLIGRAM(S): 20 TABLET, DELAYED RELEASE ORAL at 05:38

## 2020-01-01 RX ADMIN — Medication 80 MILLIGRAM(S): at 18:10

## 2020-01-01 RX ADMIN — Medication 5: at 13:11

## 2020-01-01 RX ADMIN — Medication 40 MILLIGRAM(S): at 05:27

## 2020-01-01 RX ADMIN — Medication 25 MILLIGRAM(S): at 16:58

## 2020-01-01 RX ADMIN — SIMVASTATIN 40 MILLIGRAM(S): 20 TABLET, FILM COATED ORAL at 22:06

## 2020-01-01 RX ADMIN — Medication 80 MILLIGRAM(S): at 14:39

## 2020-01-01 RX ADMIN — RANOLAZINE 1000 MILLIGRAM(S): 500 TABLET, FILM COATED, EXTENDED RELEASE ORAL at 17:43

## 2020-01-01 RX ADMIN — ISOSORBIDE MONONITRATE 120 MILLIGRAM(S): 60 TABLET, EXTENDED RELEASE ORAL at 12:30

## 2020-01-01 RX ADMIN — Medication 2: at 12:19

## 2020-01-01 RX ADMIN — HALOPERIDOL DECANOATE 2.5 MILLIGRAM(S): 100 INJECTION INTRAMUSCULAR at 20:30

## 2020-01-01 RX ADMIN — SIMVASTATIN 40 MILLIGRAM(S): 20 TABLET, FILM COATED ORAL at 21:41

## 2020-01-01 RX ADMIN — Medication 25 MILLIGRAM(S): at 17:53

## 2020-01-01 RX ADMIN — Medication 12.5 MILLIGRAM(S): at 17:17

## 2020-01-01 RX ADMIN — Medication 6 MILLIGRAM(S): at 21:23

## 2020-01-01 RX ADMIN — SODIUM CHLORIDE 50 MILLILITER(S): 9 INJECTION INTRAMUSCULAR; INTRAVENOUS; SUBCUTANEOUS at 12:02

## 2020-01-01 RX ADMIN — Medication 1000 UNIT(S): at 11:45

## 2020-01-01 RX ADMIN — Medication 4 UNIT(S): at 17:33

## 2020-01-01 RX ADMIN — Medication 1000 UNIT(S): at 11:35

## 2020-01-01 RX ADMIN — Medication 2: at 12:36

## 2020-01-01 RX ADMIN — Medication 80 MILLIGRAM(S): at 05:40

## 2020-01-01 RX ADMIN — Medication 12.5 MILLIGRAM(S): at 05:56

## 2020-01-01 RX ADMIN — MORPHINE SULFATE 2 MILLIGRAM(S): 50 CAPSULE, EXTENDED RELEASE ORAL at 21:41

## 2020-01-01 RX ADMIN — Medication 40 MILLIGRAM(S): at 06:04

## 2020-01-01 RX ADMIN — Medication 4 UNIT(S): at 13:00

## 2020-01-01 RX ADMIN — Medication 81 MILLIGRAM(S): at 12:44

## 2020-01-01 RX ADMIN — Medication 0.4 MILLIGRAM(S): at 03:44

## 2020-01-01 RX ADMIN — PANTOPRAZOLE SODIUM 40 MILLIGRAM(S): 20 TABLET, DELAYED RELEASE ORAL at 06:07

## 2020-01-01 RX ADMIN — PANTOPRAZOLE SODIUM 40 MILLIGRAM(S): 20 TABLET, DELAYED RELEASE ORAL at 05:41

## 2020-01-01 RX ADMIN — Medication 2: at 12:24

## 2020-01-01 RX ADMIN — HEPARIN SODIUM 450 UNIT(S)/HR: 5000 INJECTION INTRAVENOUS; SUBCUTANEOUS at 15:29

## 2020-01-01 RX ADMIN — Medication 40 MILLIGRAM(S): at 05:54

## 2020-01-01 RX ADMIN — IRON SUCROSE 210 MILLIGRAM(S): 20 INJECTION, SOLUTION INTRAVENOUS at 11:39

## 2020-01-01 RX ADMIN — PANTOPRAZOLE SODIUM 40 MILLIGRAM(S): 20 TABLET, DELAYED RELEASE ORAL at 05:15

## 2020-01-01 RX ADMIN — TICAGRELOR 180 MILLIGRAM(S): 90 TABLET ORAL at 10:38

## 2020-01-01 RX ADMIN — HEPARIN SODIUM 5000 UNIT(S): 5000 INJECTION INTRAVENOUS; SUBCUTANEOUS at 17:34

## 2020-01-01 RX ADMIN — Medication 40 MILLIGRAM(S): at 05:18

## 2020-01-01 RX ADMIN — Medication 81 MILLIGRAM(S): at 10:14

## 2020-01-01 RX ADMIN — Medication 60 MILLIGRAM(S): at 05:18

## 2020-01-01 RX ADMIN — Medication 40 MILLIGRAM(S): at 07:21

## 2020-01-01 RX ADMIN — ISOSORBIDE MONONITRATE 120 MILLIGRAM(S): 60 TABLET, EXTENDED RELEASE ORAL at 12:15

## 2020-01-01 RX ADMIN — Medication 1: at 13:02

## 2020-01-01 RX ADMIN — HEPARIN SODIUM 5000 UNIT(S): 5000 INJECTION INTRAVENOUS; SUBCUTANEOUS at 05:57

## 2020-01-01 RX ADMIN — Medication 1: at 17:26

## 2020-01-01 RX ADMIN — Medication 25 MILLIGRAM(S): at 18:56

## 2020-01-01 RX ADMIN — Medication 0: at 01:14

## 2020-01-01 RX ADMIN — ERYTHROPOIETIN 10000 UNIT(S): 10000 INJECTION, SOLUTION INTRAVENOUS; SUBCUTANEOUS at 05:51

## 2020-01-01 RX ADMIN — Medication 4 UNIT(S): at 12:43

## 2020-01-01 RX ADMIN — Medication 3 MILLIGRAM(S): at 22:21

## 2020-01-01 RX ADMIN — Medication 2: at 12:12

## 2020-01-01 RX ADMIN — Medication 12.5 MILLIGRAM(S): at 18:14

## 2020-01-01 RX ADMIN — SIMVASTATIN 40 MILLIGRAM(S): 20 TABLET, FILM COATED ORAL at 22:47

## 2020-01-01 RX ADMIN — Medication 4 UNIT(S): at 17:29

## 2020-01-01 RX ADMIN — Medication 2: at 12:43

## 2020-01-01 RX ADMIN — Medication 1 MILLIGRAM(S): at 11:45

## 2020-01-01 RX ADMIN — CLOPIDOGREL BISULFATE 75 MILLIGRAM(S): 75 TABLET, FILM COATED ORAL at 13:00

## 2020-01-01 RX ADMIN — ESCITALOPRAM OXALATE 5 MILLIGRAM(S): 10 TABLET, FILM COATED ORAL at 12:30

## 2020-01-01 RX ADMIN — Medication 25 MILLIGRAM(S): at 17:58

## 2020-01-01 RX ADMIN — HEPARIN SODIUM 5000 UNIT(S): 5000 INJECTION INTRAVENOUS; SUBCUTANEOUS at 15:01

## 2020-01-01 RX ADMIN — Medication 81 MILLIGRAM(S): at 12:11

## 2020-01-01 RX ADMIN — PANTOPRAZOLE SODIUM 40 MILLIGRAM(S): 20 TABLET, DELAYED RELEASE ORAL at 08:04

## 2020-01-01 RX ADMIN — Medication 12.5 MILLIGRAM(S): at 00:51

## 2020-01-01 RX ADMIN — Medication 25 MILLIGRAM(S): at 05:41

## 2020-01-01 RX ADMIN — PANTOPRAZOLE SODIUM 40 MILLIGRAM(S): 20 TABLET, DELAYED RELEASE ORAL at 07:10

## 2020-01-01 RX ADMIN — Medication 81 MILLIGRAM(S): at 12:43

## 2020-01-01 RX ADMIN — SIMVASTATIN 40 MILLIGRAM(S): 20 TABLET, FILM COATED ORAL at 21:12

## 2020-01-01 RX ADMIN — Medication 1 MILLIGRAM(S): at 12:20

## 2020-01-01 RX ADMIN — Medication 12.5 MILLIGRAM(S): at 12:34

## 2020-01-01 RX ADMIN — HEPARIN SODIUM 0 UNIT(S)/HR: 5000 INJECTION INTRAVENOUS; SUBCUTANEOUS at 13:01

## 2020-01-01 RX ADMIN — Medication 25 MILLIGRAM(S): at 18:34

## 2020-01-01 RX ADMIN — Medication 2: at 17:43

## 2020-01-01 RX ADMIN — HEPARIN SODIUM 200 UNIT(S)/HR: 5000 INJECTION INTRAVENOUS; SUBCUTANEOUS at 14:08

## 2020-01-01 RX ADMIN — RANOLAZINE 1000 MILLIGRAM(S): 500 TABLET, FILM COATED, EXTENDED RELEASE ORAL at 17:58

## 2020-01-01 RX ADMIN — ISOSORBIDE MONONITRATE 120 MILLIGRAM(S): 60 TABLET, EXTENDED RELEASE ORAL at 12:34

## 2020-01-01 RX ADMIN — RANOLAZINE 1000 MILLIGRAM(S): 500 TABLET, FILM COATED, EXTENDED RELEASE ORAL at 05:27

## 2020-01-01 RX ADMIN — Medication 650 MILLIGRAM(S): at 17:53

## 2020-01-01 RX ADMIN — Medication 1: at 17:30

## 2020-01-01 RX ADMIN — Medication 25 MILLIGRAM(S): at 07:21

## 2020-01-01 RX ADMIN — RANOLAZINE 500 MILLIGRAM(S): 500 TABLET, FILM COATED, EXTENDED RELEASE ORAL at 09:38

## 2020-01-01 RX ADMIN — Medication 1 MILLIGRAM(S): at 12:44

## 2020-01-01 RX ADMIN — INSULIN GLARGINE 3 UNIT(S): 100 INJECTION, SOLUTION SUBCUTANEOUS at 22:29

## 2020-01-01 RX ADMIN — CLOPIDOGREL BISULFATE 75 MILLIGRAM(S): 75 TABLET, FILM COATED ORAL at 12:13

## 2020-01-01 RX ADMIN — Medication 3 MILLIGRAM(S): at 21:59

## 2020-01-01 RX ADMIN — HEPARIN SODIUM 5000 UNIT(S): 5000 INJECTION INTRAVENOUS; SUBCUTANEOUS at 22:21

## 2020-01-01 RX ADMIN — HEPARIN SODIUM 5000 UNIT(S): 5000 INJECTION INTRAVENOUS; SUBCUTANEOUS at 12:44

## 2020-01-01 RX ADMIN — Medication 1: at 11:39

## 2020-01-01 RX ADMIN — PANTOPRAZOLE SODIUM 40 MILLIGRAM(S): 20 TABLET, DELAYED RELEASE ORAL at 06:15

## 2020-01-01 RX ADMIN — CLOPIDOGREL BISULFATE 75 MILLIGRAM(S): 75 TABLET, FILM COATED ORAL at 11:45

## 2020-01-01 RX ADMIN — HEPARIN SODIUM 5000 UNIT(S): 5000 INJECTION INTRAVENOUS; SUBCUTANEOUS at 05:06

## 2020-01-01 RX ADMIN — Medication 60 MILLIGRAM(S): at 05:50

## 2020-01-01 RX ADMIN — PANTOPRAZOLE SODIUM 40 MILLIGRAM(S): 20 TABLET, DELAYED RELEASE ORAL at 05:56

## 2020-01-01 RX ADMIN — Medication 1: at 22:47

## 2020-01-01 RX ADMIN — Medication 25 MILLIGRAM(S): at 17:33

## 2020-01-01 RX ADMIN — Medication 1: at 11:59

## 2020-01-01 RX ADMIN — PANTOPRAZOLE SODIUM 40 MILLIGRAM(S): 20 TABLET, DELAYED RELEASE ORAL at 08:44

## 2020-01-01 RX ADMIN — Medication 1000 UNIT(S): at 12:30

## 2020-01-01 RX ADMIN — Medication 6 MILLIGRAM(S): at 01:53

## 2020-01-01 RX ADMIN — PANTOPRAZOLE SODIUM 40 MILLIGRAM(S): 20 TABLET, DELAYED RELEASE ORAL at 05:39

## 2020-01-01 RX ADMIN — Medication 25 MILLIGRAM(S): at 18:10

## 2020-01-01 RX ADMIN — CLOPIDOGREL BISULFATE 75 MILLIGRAM(S): 75 TABLET, FILM COATED ORAL at 12:29

## 2020-01-01 RX ADMIN — Medication 4: at 17:47

## 2020-01-01 RX ADMIN — HEPARIN SODIUM 5000 UNIT(S): 5000 INJECTION INTRAVENOUS; SUBCUTANEOUS at 21:44

## 2020-01-01 RX ADMIN — Medication 1: at 18:10

## 2020-01-01 RX ADMIN — Medication 1: at 17:36

## 2020-01-01 RX ADMIN — Medication 1: at 08:03

## 2020-01-01 RX ADMIN — HEPARIN SODIUM 5000 UNIT(S): 5000 INJECTION INTRAVENOUS; SUBCUTANEOUS at 17:33

## 2020-01-01 RX ADMIN — Medication 25 MILLIGRAM(S): at 17:27

## 2020-01-01 RX ADMIN — Medication 1000 UNIT(S): at 12:33

## 2020-01-01 RX ADMIN — Medication 2: at 08:08

## 2020-01-01 RX ADMIN — MAGNESIUM OXIDE 400 MG ORAL TABLET 400 MILLIGRAM(S): 241.3 TABLET ORAL at 08:04

## 2020-01-01 RX ADMIN — RANOLAZINE 500 MILLIGRAM(S): 500 TABLET, FILM COATED, EXTENDED RELEASE ORAL at 08:04

## 2020-01-01 RX ADMIN — Medication 25 MILLIGRAM(S): at 17:43

## 2020-01-01 RX ADMIN — ESCITALOPRAM OXALATE 5 MILLIGRAM(S): 10 TABLET, FILM COATED ORAL at 13:00

## 2020-01-01 RX ADMIN — HEPARIN SODIUM 5000 UNIT(S): 5000 INJECTION INTRAVENOUS; SUBCUTANEOUS at 07:21

## 2020-01-01 RX ADMIN — RANOLAZINE 500 MILLIGRAM(S): 500 TABLET, FILM COATED, EXTENDED RELEASE ORAL at 18:10

## 2020-01-01 RX ADMIN — Medication 81 MILLIGRAM(S): at 12:28

## 2020-01-01 RX ADMIN — Medication 2: at 12:20

## 2020-01-01 RX ADMIN — Medication 25 MILLIGRAM(S): at 06:04

## 2020-01-01 RX ADMIN — Medication 4: at 12:14

## 2020-01-01 RX ADMIN — PANTOPRAZOLE SODIUM 40 MILLIGRAM(S): 20 TABLET, DELAYED RELEASE ORAL at 17:09

## 2020-01-01 RX ADMIN — Medication 25 MILLIGRAM(S): at 15:50

## 2020-01-01 RX ADMIN — Medication 25 MILLIGRAM(S): at 06:06

## 2020-01-01 RX ADMIN — HEPARIN SODIUM 250 UNIT(S)/HR: 5000 INJECTION INTRAVENOUS; SUBCUTANEOUS at 22:49

## 2020-01-01 RX ADMIN — Medication 1: at 07:47

## 2020-01-01 RX ADMIN — Medication 20 MILLIGRAM(S): at 00:52

## 2020-01-01 RX ADMIN — Medication 3: at 09:37

## 2020-01-01 RX ADMIN — RANOLAZINE 500 MILLIGRAM(S): 500 TABLET, FILM COATED, EXTENDED RELEASE ORAL at 19:11

## 2020-01-01 RX ADMIN — Medication 81 MILLIGRAM(S): at 13:07

## 2020-01-01 RX ADMIN — HEPARIN SODIUM 5000 UNIT(S): 5000 INJECTION INTRAVENOUS; SUBCUTANEOUS at 05:18

## 2020-01-01 RX ADMIN — ERYTHROPOIETIN 10000 UNIT(S): 10000 INJECTION, SOLUTION INTRAVENOUS; SUBCUTANEOUS at 00:00

## 2020-01-01 RX ADMIN — SIMVASTATIN 40 MILLIGRAM(S): 20 TABLET, FILM COATED ORAL at 22:36

## 2020-01-01 RX ADMIN — Medication 2: at 11:57

## 2020-01-01 RX ADMIN — ISOSORBIDE MONONITRATE 120 MILLIGRAM(S): 60 TABLET, EXTENDED RELEASE ORAL at 12:46

## 2020-01-01 RX ADMIN — Medication 40 MILLIGRAM(S): at 11:49

## 2020-01-01 RX ADMIN — HEPARIN SODIUM 5000 UNIT(S): 5000 INJECTION INTRAVENOUS; SUBCUTANEOUS at 00:51

## 2020-01-01 RX ADMIN — CLOPIDOGREL BISULFATE 75 MILLIGRAM(S): 75 TABLET, FILM COATED ORAL at 11:40

## 2020-01-01 RX ADMIN — Medication 12.5 MILLIGRAM(S): at 17:48

## 2020-01-01 RX ADMIN — ESCITALOPRAM OXALATE 5 MILLIGRAM(S): 10 TABLET, FILM COATED ORAL at 11:45

## 2020-01-01 RX ADMIN — ISOSORBIDE MONONITRATE 120 MILLIGRAM(S): 60 TABLET, EXTENDED RELEASE ORAL at 15:50

## 2020-01-01 RX ADMIN — HEPARIN SODIUM 900 UNIT(S)/HR: 5000 INJECTION INTRAVENOUS; SUBCUTANEOUS at 10:20

## 2020-01-01 RX ADMIN — Medication 30 MILLILITER(S): at 13:07

## 2020-01-01 RX ADMIN — ISOSORBIDE MONONITRATE 120 MILLIGRAM(S): 60 TABLET, EXTENDED RELEASE ORAL at 18:56

## 2020-01-01 RX ADMIN — Medication 650 MILLIGRAM(S): at 18:50

## 2020-01-01 RX ADMIN — PANTOPRAZOLE SODIUM 40 MILLIGRAM(S): 20 TABLET, DELAYED RELEASE ORAL at 05:45

## 2020-01-01 RX ADMIN — SIMVASTATIN 40 MILLIGRAM(S): 20 TABLET, FILM COATED ORAL at 23:11

## 2020-01-01 RX ADMIN — Medication 0.5 MILLIGRAM(S): at 12:25

## 2020-01-01 RX ADMIN — Medication 1: at 18:14

## 2020-01-01 RX ADMIN — Medication 2: at 21:14

## 2020-01-01 RX ADMIN — Medication 25 MILLIGRAM(S): at 17:48

## 2020-01-01 RX ADMIN — Medication 25 MILLIGRAM(S): at 05:58

## 2020-01-01 RX ADMIN — RANOLAZINE 500 MILLIGRAM(S): 500 TABLET, FILM COATED, EXTENDED RELEASE ORAL at 05:27

## 2020-01-01 RX ADMIN — Medication 81 MILLIGRAM(S): at 12:20

## 2020-01-01 RX ADMIN — MORPHINE SULFATE 2 MILLIGRAM(S): 50 CAPSULE, EXTENDED RELEASE ORAL at 05:35

## 2020-01-01 RX ADMIN — PANTOPRAZOLE SODIUM 40 MILLIGRAM(S): 20 TABLET, DELAYED RELEASE ORAL at 05:58

## 2020-01-01 RX ADMIN — Medication 100 MILLIGRAM(S): at 04:04

## 2020-01-01 RX ADMIN — ESCITALOPRAM OXALATE 5 MILLIGRAM(S): 10 TABLET, FILM COATED ORAL at 12:29

## 2020-01-01 RX ADMIN — SIMVASTATIN 40 MILLIGRAM(S): 20 TABLET, FILM COATED ORAL at 22:23

## 2020-01-01 RX ADMIN — ESCITALOPRAM OXALATE 5 MILLIGRAM(S): 10 TABLET, FILM COATED ORAL at 15:50

## 2020-01-01 RX ADMIN — HEPARIN SODIUM 5000 UNIT(S): 5000 INJECTION INTRAVENOUS; SUBCUTANEOUS at 13:08

## 2020-01-01 RX ADMIN — Medication 1 MILLIGRAM(S): at 11:40

## 2020-01-01 RX ADMIN — LEVALBUTEROL 0.63 MILLIGRAM(S): 1.25 SOLUTION, CONCENTRATE RESPIRATORY (INHALATION) at 00:15

## 2020-01-01 RX ADMIN — PANTOPRAZOLE SODIUM 40 MILLIGRAM(S): 20 TABLET, DELAYED RELEASE ORAL at 05:28

## 2020-01-01 RX ADMIN — RANOLAZINE 1000 MILLIGRAM(S): 500 TABLET, FILM COATED, EXTENDED RELEASE ORAL at 10:12

## 2020-01-01 RX ADMIN — OLANZAPINE 10 MILLIGRAM(S): 15 TABLET, FILM COATED ORAL at 23:50

## 2020-01-01 RX ADMIN — RANOLAZINE 1000 MILLIGRAM(S): 500 TABLET, FILM COATED, EXTENDED RELEASE ORAL at 17:30

## 2020-01-01 RX ADMIN — Medication 25 MILLIGRAM(S): at 05:57

## 2020-01-01 RX ADMIN — HEPARIN SODIUM 5000 UNIT(S): 5000 INJECTION INTRAVENOUS; SUBCUTANEOUS at 21:59

## 2020-01-01 RX ADMIN — Medication 1 MILLIGRAM(S): at 12:29

## 2020-01-01 RX ADMIN — PANTOPRAZOLE SODIUM 40 MILLIGRAM(S): 20 TABLET, DELAYED RELEASE ORAL at 17:47

## 2020-01-01 RX ADMIN — Medication 4 UNIT(S): at 08:48

## 2020-01-01 RX ADMIN — ISOSORBIDE MONONITRATE 120 MILLIGRAM(S): 60 TABLET, EXTENDED RELEASE ORAL at 12:20

## 2020-01-01 RX ADMIN — MAGNESIUM OXIDE 400 MG ORAL TABLET 400 MILLIGRAM(S): 241.3 TABLET ORAL at 18:10

## 2020-01-01 RX ADMIN — Medication 1 MILLIGRAM(S): at 12:13

## 2020-01-01 RX ADMIN — Medication 6 MILLIGRAM(S): at 21:41

## 2020-01-01 RX ADMIN — PANTOPRAZOLE SODIUM 40 MILLIGRAM(S): 20 TABLET, DELAYED RELEASE ORAL at 05:14

## 2020-01-01 RX ADMIN — Medication 60 MILLIGRAM(S): at 17:48

## 2020-01-01 RX ADMIN — HEPARIN SODIUM 400 UNIT(S)/HR: 5000 INJECTION INTRAVENOUS; SUBCUTANEOUS at 08:13

## 2020-01-01 RX ADMIN — RANOLAZINE 1000 MILLIGRAM(S): 500 TABLET, FILM COATED, EXTENDED RELEASE ORAL at 05:17

## 2020-01-01 RX ADMIN — HEPARIN SODIUM 5000 UNIT(S): 5000 INJECTION INTRAVENOUS; SUBCUTANEOUS at 05:39

## 2020-01-01 RX ADMIN — RANOLAZINE 500 MILLIGRAM(S): 500 TABLET, FILM COATED, EXTENDED RELEASE ORAL at 18:11

## 2020-01-01 RX ADMIN — CLOPIDOGREL BISULFATE 75 MILLIGRAM(S): 75 TABLET, FILM COATED ORAL at 12:20

## 2020-01-01 RX ADMIN — Medication 25 MILLIGRAM(S): at 05:06

## 2020-01-01 RX ADMIN — Medication 40 MILLIGRAM(S): at 17:47

## 2020-01-01 RX ADMIN — Medication 12.5 MILLIGRAM(S): at 11:11

## 2020-01-01 RX ADMIN — ISOSORBIDE MONONITRATE 120 MILLIGRAM(S): 60 TABLET, EXTENDED RELEASE ORAL at 13:55

## 2020-01-01 RX ADMIN — RANOLAZINE 500 MILLIGRAM(S): 500 TABLET, FILM COATED, EXTENDED RELEASE ORAL at 05:41

## 2020-01-01 RX ADMIN — SIMVASTATIN 40 MILLIGRAM(S): 20 TABLET, FILM COATED ORAL at 22:29

## 2020-01-01 RX ADMIN — Medication 1: at 17:09

## 2020-01-01 RX ADMIN — Medication 5: at 12:01

## 2020-01-01 RX ADMIN — ESCITALOPRAM OXALATE 5 MILLIGRAM(S): 10 TABLET, FILM COATED ORAL at 11:10

## 2020-01-01 RX ADMIN — Medication 1: at 09:38

## 2020-01-01 RX ADMIN — ESCITALOPRAM OXALATE 5 MILLIGRAM(S): 10 TABLET, FILM COATED ORAL at 12:44

## 2020-01-01 RX ADMIN — MAGNESIUM OXIDE 400 MG ORAL TABLET 400 MILLIGRAM(S): 241.3 TABLET ORAL at 12:13

## 2020-01-01 RX ADMIN — PANTOPRAZOLE SODIUM 40 MILLIGRAM(S): 20 TABLET, DELAYED RELEASE ORAL at 07:21

## 2020-01-01 RX ADMIN — Medication 0.4 MILLIGRAM(S): at 20:58

## 2020-01-01 RX ADMIN — Medication 1: at 12:12

## 2020-01-01 RX ADMIN — Medication 40 MILLIGRAM(S): at 06:07

## 2020-01-01 RX ADMIN — Medication 1: at 17:44

## 2020-01-01 RX ADMIN — Medication 40 MILLIGRAM(S): at 05:06

## 2020-01-01 RX ADMIN — Medication 81 MILLIGRAM(S): at 12:30

## 2020-01-01 RX ADMIN — Medication 60 MILLIGRAM(S): at 18:56

## 2020-01-01 RX ADMIN — Medication 2 UNIT(S): at 17:29

## 2020-01-01 RX ADMIN — Medication 1: at 08:26

## 2020-01-01 RX ADMIN — RANOLAZINE 500 MILLIGRAM(S): 500 TABLET, FILM COATED, EXTENDED RELEASE ORAL at 18:34

## 2020-01-01 RX ADMIN — Medication 0.4 MILLIGRAM(S): at 03:26

## 2020-01-01 RX ADMIN — MAGNESIUM OXIDE 400 MG ORAL TABLET 400 MILLIGRAM(S): 241.3 TABLET ORAL at 07:52

## 2020-01-01 RX ADMIN — Medication 1: at 21:39

## 2020-01-01 RX ADMIN — INSULIN GLARGINE 6 UNIT(S): 100 INJECTION, SOLUTION SUBCUTANEOUS at 22:48

## 2020-01-01 RX ADMIN — PANTOPRAZOLE SODIUM 40 MILLIGRAM(S): 20 TABLET, DELAYED RELEASE ORAL at 05:16

## 2020-01-01 RX ADMIN — Medication 2: at 08:02

## 2020-01-01 RX ADMIN — Medication 1 MILLIGRAM(S): at 13:00

## 2020-01-01 RX ADMIN — MAGNESIUM OXIDE 400 MG ORAL TABLET 400 MILLIGRAM(S): 241.3 TABLET ORAL at 18:56

## 2020-01-01 RX ADMIN — Medication 81 MILLIGRAM(S): at 12:34

## 2020-01-01 RX ADMIN — HEPARIN SODIUM 5000 UNIT(S): 5000 INJECTION INTRAVENOUS; SUBCUTANEOUS at 13:52

## 2020-01-01 RX ADMIN — CLOPIDOGREL BISULFATE 75 MILLIGRAM(S): 75 TABLET, FILM COATED ORAL at 14:37

## 2020-01-01 RX ADMIN — Medication 1000 UNIT(S): at 12:13

## 2020-01-01 RX ADMIN — Medication 1: at 17:52

## 2020-01-01 RX ADMIN — MAGNESIUM OXIDE 400 MG ORAL TABLET 400 MILLIGRAM(S): 241.3 TABLET ORAL at 11:56

## 2020-01-01 RX ADMIN — HEPARIN SODIUM 5000 UNIT(S): 5000 INJECTION INTRAVENOUS; SUBCUTANEOUS at 22:06

## 2020-01-01 RX ADMIN — Medication 25 MILLIGRAM(S): at 05:18

## 2020-01-01 RX ADMIN — PANTOPRAZOLE SODIUM 40 MILLIGRAM(S): 20 TABLET, DELAYED RELEASE ORAL at 17:41

## 2020-01-01 RX ADMIN — Medication 81 MILLIGRAM(S): at 11:11

## 2020-01-01 RX ADMIN — PANTOPRAZOLE SODIUM 40 MILLIGRAM(S): 20 TABLET, DELAYED RELEASE ORAL at 06:16

## 2020-01-01 RX ADMIN — Medication 5 MILLIGRAM(S): at 17:58

## 2020-01-01 RX ADMIN — HEPARIN SODIUM 5000 UNIT(S): 5000 INJECTION INTRAVENOUS; SUBCUTANEOUS at 05:19

## 2020-01-01 RX ADMIN — Medication 25 MILLIGRAM(S): at 05:27

## 2020-01-01 RX ADMIN — Medication 2 UNIT(S): at 08:27

## 2020-01-01 RX ADMIN — Medication 12.5 MILLIGRAM(S): at 17:26

## 2020-01-01 RX ADMIN — Medication 0.4 MILLIGRAM(S): at 19:00

## 2020-01-01 RX ADMIN — PANTOPRAZOLE SODIUM 40 MILLIGRAM(S): 20 TABLET, DELAYED RELEASE ORAL at 06:06

## 2020-01-01 RX ADMIN — PANTOPRAZOLE SODIUM 40 MILLIGRAM(S): 20 TABLET, DELAYED RELEASE ORAL at 05:36

## 2020-01-01 RX ADMIN — HEPARIN SODIUM 5000 UNIT(S): 5000 INJECTION INTRAVENOUS; SUBCUTANEOUS at 14:00

## 2020-01-01 RX ADMIN — Medication 25 MILLIGRAM(S): at 17:30

## 2020-01-01 RX ADMIN — HEPARIN SODIUM 5000 UNIT(S): 5000 INJECTION INTRAVENOUS; SUBCUTANEOUS at 21:46

## 2020-01-01 RX ADMIN — POLYETHYLENE GLYCOL 3350 17 GRAM(S): 17 POWDER, FOR SOLUTION ORAL at 12:20

## 2020-01-01 RX ADMIN — HEPARIN SODIUM 5000 UNIT(S): 5000 INJECTION INTRAVENOUS; SUBCUTANEOUS at 23:53

## 2020-01-01 RX ADMIN — Medication 1: at 08:19

## 2020-01-01 RX ADMIN — Medication 1: at 08:05

## 2020-01-01 RX ADMIN — SIMVASTATIN 40 MILLIGRAM(S): 20 TABLET, FILM COATED ORAL at 21:59

## 2020-01-01 RX ADMIN — PANTOPRAZOLE SODIUM 40 MILLIGRAM(S): 20 TABLET, DELAYED RELEASE ORAL at 06:04

## 2020-01-01 RX ADMIN — Medication 80 MILLIGRAM(S): at 05:58

## 2020-01-01 RX ADMIN — MORPHINE SULFATE 2 MILLIGRAM(S): 50 CAPSULE, EXTENDED RELEASE ORAL at 10:03

## 2020-01-01 RX ADMIN — POTASSIUM PHOSPHATE, MONOBASIC POTASSIUM PHOSPHATE, DIBASIC 62.5 MILLIMOLE(S): 236; 224 INJECTION, SOLUTION INTRAVENOUS at 17:54

## 2020-01-01 RX ADMIN — Medication 1000 UNIT(S): at 12:20

## 2020-01-01 RX ADMIN — Medication 81 MILLIGRAM(S): at 11:45

## 2020-01-01 RX ADMIN — Medication 25 MILLIGRAM(S): at 05:14

## 2020-01-01 RX ADMIN — Medication 1: at 17:50

## 2020-01-01 RX ADMIN — RANOLAZINE 500 MILLIGRAM(S): 500 TABLET, FILM COATED, EXTENDED RELEASE ORAL at 05:58

## 2020-01-01 RX ADMIN — Medication 3: at 12:09

## 2020-01-01 RX ADMIN — Medication 3 MILLIGRAM(S): at 21:26

## 2020-01-01 RX ADMIN — Medication 12.5 MILLIGRAM(S): at 05:26

## 2020-01-01 RX ADMIN — Medication 100 MILLIGRAM(S): at 12:32

## 2020-01-01 RX ADMIN — PANTOPRAZOLE SODIUM 40 MILLIGRAM(S): 20 TABLET, DELAYED RELEASE ORAL at 05:48

## 2020-01-01 RX ADMIN — Medication 4 UNIT(S): at 09:25

## 2020-01-01 RX ADMIN — Medication 40 MILLIGRAM(S): at 00:51

## 2020-01-01 RX ADMIN — HEPARIN SODIUM 5000 UNIT(S): 5000 INJECTION INTRAVENOUS; SUBCUTANEOUS at 05:16

## 2020-01-01 RX ADMIN — Medication 40 MILLIGRAM(S): at 11:30

## 2020-01-01 RX ADMIN — INSULIN GLARGINE 6 UNIT(S): 100 INJECTION, SOLUTION SUBCUTANEOUS at 23:19

## 2020-01-01 RX ADMIN — RANOLAZINE 1000 MILLIGRAM(S): 500 TABLET, FILM COATED, EXTENDED RELEASE ORAL at 06:04

## 2020-01-01 RX ADMIN — Medication 4: at 12:41

## 2020-01-01 RX ADMIN — Medication 40 MILLIGRAM(S): at 07:10

## 2020-01-01 RX ADMIN — SACUBITRIL AND VALSARTAN 1 TABLET(S): 24; 26 TABLET, FILM COATED ORAL at 18:32

## 2020-01-01 RX ADMIN — PANTOPRAZOLE SODIUM 40 MILLIGRAM(S): 20 TABLET, DELAYED RELEASE ORAL at 05:18

## 2020-01-01 RX ADMIN — POTASSIUM PHOSPHATE, MONOBASIC POTASSIUM PHOSPHATE, DIBASIC 62.5 MILLIMOLE(S): 236; 224 INJECTION, SOLUTION INTRAVENOUS at 14:48

## 2020-01-01 RX ADMIN — Medication 2: at 17:28

## 2020-01-01 RX ADMIN — SIMVASTATIN 40 MILLIGRAM(S): 20 TABLET, FILM COATED ORAL at 00:52

## 2020-01-01 RX ADMIN — CLOPIDOGREL BISULFATE 75 MILLIGRAM(S): 75 TABLET, FILM COATED ORAL at 12:44

## 2020-01-01 RX ADMIN — Medication 3: at 17:48

## 2020-01-01 RX ADMIN — Medication 40 MILLIGRAM(S): at 05:16

## 2020-01-01 RX ADMIN — Medication 1: at 13:07

## 2020-01-01 RX ADMIN — Medication 2: at 17:17

## 2020-01-01 RX ADMIN — SENNA PLUS 2 TABLET(S): 8.6 TABLET ORAL at 22:22

## 2020-01-01 RX ADMIN — CLOPIDOGREL BISULFATE 75 MILLIGRAM(S): 75 TABLET, FILM COATED ORAL at 11:35

## 2020-01-01 RX ADMIN — ESCITALOPRAM OXALATE 5 MILLIGRAM(S): 10 TABLET, FILM COATED ORAL at 11:35

## 2020-01-01 RX ADMIN — PANTOPRAZOLE SODIUM 40 MILLIGRAM(S): 20 TABLET, DELAYED RELEASE ORAL at 21:22

## 2020-01-01 RX ADMIN — CLOPIDOGREL BISULFATE 75 MILLIGRAM(S): 75 TABLET, FILM COATED ORAL at 12:30

## 2020-01-01 RX ADMIN — SODIUM CHLORIDE 100 MILLILITER(S): 9 INJECTION, SOLUTION INTRAVENOUS at 05:57

## 2020-01-01 RX ADMIN — HEPARIN SODIUM 5000 UNIT(S): 5000 INJECTION INTRAVENOUS; SUBCUTANEOUS at 14:30

## 2020-01-01 RX ADMIN — HEPARIN SODIUM 5000 UNIT(S): 5000 INJECTION INTRAVENOUS; SUBCUTANEOUS at 12:13

## 2020-01-01 RX ADMIN — HEPARIN SODIUM 5000 UNIT(S): 5000 INJECTION INTRAVENOUS; SUBCUTANEOUS at 21:26

## 2020-01-01 RX ADMIN — ESCITALOPRAM OXALATE 5 MILLIGRAM(S): 10 TABLET, FILM COATED ORAL at 12:20

## 2020-01-01 RX ADMIN — RANOLAZINE 1000 MILLIGRAM(S): 500 TABLET, FILM COATED, EXTENDED RELEASE ORAL at 17:32

## 2020-01-01 RX ADMIN — Medication 3: at 14:07

## 2020-01-01 RX ADMIN — Medication 2: at 17:25

## 2020-01-01 RX ADMIN — HEPARIN SODIUM 5000 UNIT(S): 5000 INJECTION INTRAVENOUS; SUBCUTANEOUS at 06:16

## 2020-01-01 RX ADMIN — MORPHINE SULFATE 2 MILLIGRAM(S): 50 CAPSULE, EXTENDED RELEASE ORAL at 13:54

## 2020-01-01 RX ADMIN — HEPARIN SODIUM 0 UNIT(S)/HR: 5000 INJECTION INTRAVENOUS; SUBCUTANEOUS at 11:42

## 2020-01-01 RX ADMIN — Medication 25 MILLIGRAM(S): at 06:05

## 2020-01-01 RX ADMIN — HALOPERIDOL DECANOATE 2.5 MILLIGRAM(S): 100 INJECTION INTRAMUSCULAR at 20:58

## 2020-01-01 RX ADMIN — HEPARIN SODIUM 5000 UNIT(S): 5000 INJECTION INTRAVENOUS; SUBCUTANEOUS at 17:44

## 2020-01-01 RX ADMIN — ISOSORBIDE MONONITRATE 120 MILLIGRAM(S): 60 TABLET, EXTENDED RELEASE ORAL at 12:44

## 2020-01-01 RX ADMIN — SIMVASTATIN 40 MILLIGRAM(S): 20 TABLET, FILM COATED ORAL at 22:02

## 2020-01-01 RX ADMIN — Medication 0.4 MILLIGRAM(S): at 20:51

## 2020-01-01 RX ADMIN — HEPARIN SODIUM 5000 UNIT(S): 5000 INJECTION INTRAVENOUS; SUBCUTANEOUS at 05:46

## 2020-01-01 RX ADMIN — Medication 100 GRAM(S): at 12:13

## 2020-01-01 RX ADMIN — SIMVASTATIN 40 MILLIGRAM(S): 20 TABLET, FILM COATED ORAL at 22:49

## 2020-01-01 RX ADMIN — ISOSORBIDE MONONITRATE 120 MILLIGRAM(S): 60 TABLET, EXTENDED RELEASE ORAL at 12:12

## 2020-01-01 RX ADMIN — Medication 80 MILLIGRAM(S): at 18:34

## 2020-01-01 RX ADMIN — HEPARIN SODIUM 500 UNIT(S)/HR: 5000 INJECTION INTRAVENOUS; SUBCUTANEOUS at 02:02

## 2020-01-01 RX ADMIN — Medication 40 MILLIEQUIVALENT(S): at 14:38

## 2020-01-01 RX ADMIN — RANOLAZINE 1000 MILLIGRAM(S): 500 TABLET, FILM COATED, EXTENDED RELEASE ORAL at 17:44

## 2020-01-01 RX ADMIN — RANOLAZINE 500 MILLIGRAM(S): 500 TABLET, FILM COATED, EXTENDED RELEASE ORAL at 05:46

## 2020-01-01 RX ADMIN — PANTOPRAZOLE SODIUM 40 MILLIGRAM(S): 20 TABLET, DELAYED RELEASE ORAL at 06:53

## 2020-01-01 RX ADMIN — ISOSORBIDE MONONITRATE 120 MILLIGRAM(S): 60 TABLET, EXTENDED RELEASE ORAL at 12:48

## 2020-01-01 RX ADMIN — ESCITALOPRAM OXALATE 5 MILLIGRAM(S): 10 TABLET, FILM COATED ORAL at 14:37

## 2020-01-01 RX ADMIN — Medication 40 MILLIGRAM(S): at 06:06

## 2020-01-01 RX ADMIN — Medication 25 MILLIGRAM(S): at 05:16

## 2020-01-01 RX ADMIN — DOPAMINE HYDROCHLORIDE 26.25 MICROGRAM(S)/KG/MIN: 40 INJECTION, SOLUTION, CONCENTRATE INTRAVENOUS at 12:27

## 2020-01-01 RX ADMIN — ISOSORBIDE MONONITRATE 120 MILLIGRAM(S): 60 TABLET, EXTENDED RELEASE ORAL at 11:35

## 2020-01-01 RX ADMIN — HEPARIN SODIUM 5000 UNIT(S): 5000 INJECTION INTRAVENOUS; SUBCUTANEOUS at 17:30

## 2020-01-01 RX ADMIN — RANOLAZINE 500 MILLIGRAM(S): 500 TABLET, FILM COATED, EXTENDED RELEASE ORAL at 19:31

## 2020-01-01 RX ADMIN — Medication 2: at 08:16

## 2020-01-01 RX ADMIN — RANOLAZINE 500 MILLIGRAM(S): 500 TABLET, FILM COATED, EXTENDED RELEASE ORAL at 17:49

## 2020-01-01 RX ADMIN — Medication 12.5 MILLIGRAM(S): at 17:11

## 2020-01-01 RX ADMIN — Medication 81 MILLIGRAM(S): at 12:45

## 2020-01-01 RX ADMIN — Medication 1: at 17:42

## 2020-01-01 RX ADMIN — Medication 12.5 MILLIGRAM(S): at 05:45

## 2020-01-01 RX ADMIN — Medication 25 MILLIGRAM(S): at 07:10

## 2020-01-01 RX ADMIN — SIMVASTATIN 40 MILLIGRAM(S): 20 TABLET, FILM COATED ORAL at 23:53

## 2020-01-01 RX ADMIN — Medication 2 UNIT(S): at 12:41

## 2020-01-01 RX ADMIN — Medication 1: at 07:54

## 2020-01-01 RX ADMIN — Medication 2: at 12:29

## 2020-01-01 RX ADMIN — Medication 2 UNIT(S): at 14:07

## 2020-01-01 RX ADMIN — Medication 1: at 08:02

## 2020-01-01 RX ADMIN — HEPARIN SODIUM 4100 UNIT(S): 5000 INJECTION INTRAVENOUS; SUBCUTANEOUS at 19:39

## 2020-01-01 RX ADMIN — Medication 81 MILLIGRAM(S): at 12:01

## 2020-01-01 RX ADMIN — Medication 1 MILLIGRAM(S): at 11:35

## 2020-01-01 RX ADMIN — Medication 25 MILLIGRAM(S): at 17:44

## 2020-01-01 RX ADMIN — HEPARIN SODIUM 5000 UNIT(S): 5000 INJECTION INTRAVENOUS; SUBCUTANEOUS at 06:06

## 2020-01-01 RX ADMIN — Medication 12.5 MILLIGRAM(S): at 05:15

## 2020-01-01 RX ADMIN — Medication 4 UNIT(S): at 17:48

## 2020-01-01 RX ADMIN — Medication 81 MILLIGRAM(S): at 14:41

## 2020-01-01 RX ADMIN — Medication 1000 UNIT(S): at 12:44

## 2020-01-01 RX ADMIN — Medication 12.5 MILLIGRAM(S): at 05:38

## 2020-01-01 RX ADMIN — ISOSORBIDE MONONITRATE 120 MILLIGRAM(S): 60 TABLET, EXTENDED RELEASE ORAL at 11:45

## 2020-01-01 RX ADMIN — HEPARIN SODIUM 800 UNIT(S)/HR: 5000 INJECTION INTRAVENOUS; SUBCUTANEOUS at 19:40

## 2020-01-01 RX ADMIN — Medication 2: at 12:25

## 2020-01-01 RX ADMIN — Medication 25 MILLIGRAM(S): at 06:16

## 2020-01-01 RX ADMIN — Medication 12.5 MILLIGRAM(S): at 07:31

## 2020-02-19 NOTE — CONSULT NOTE ADULT - ASSESSMENT
Patient is a 89yo male with past medical history of CAD (triple vessel disease diagnosed in 2016, not amenable to intervention), DM2, HTN who presents to the ED with symptomatic atrial fibrillation with slow ventricular response, junctional escape  and pauses up to 5.45 seconds. Refused TVP.  Echo with severe LV dysfunction.  Patient DNR,   He was noted to have ST depressions and elevated cardiac enzymes. He was initially loaded with ASA, Brilinta and started on heparin gtt for ACS. However, found to be anemic, anticoagulation discontinued and now s/p transfusion.   Patient initially refusing PPM but after a long discussion with the patient and his family concerning all aspects of PPM therapy including  the risks, benefits and alternatives and what it means to live with a pacemaker and answering all questions, he agreed to the device.   Plan: Trend cardiac enzymes          Hold all AV padma blockers          NPO for dual chamber PPM tomorrow.

## 2020-02-19 NOTE — H&P ADULT - HISTORY OF PRESENT ILLNESS
89 yo male w h/o CAD (triple vessel disease diagnosed in 2016, not amenable to intervention), DM2, HTN who presents to the ED with dyspnea, chest pressure and dizziness. In the ED pt was noted to have ST depressions and elevated cardiac enzymes. He was initially loaded with ASA, Brilinta and started on heparin gtt for ACS. However he was then noted to be anemic (Hgb 7.7, last documented 11.7). Therefore heparin was stopped and pRBC transfusion was ordered. The pt subsequently developed worsening bradycardia, with junctional rhythm in the low 40s. A TTE revealed severe LV dysfunction. The pt's prior angiogram was reviewed with Dr. Rivera and Dr. Kendrick. Given his prior angiogram which showed severe CAD not amenable to intervention as well as his anemia, he was deemed not to be a candidate for urgent cath. Dopamine gtt was started for the patient's bradycardia and a TVP was recommended. However, pt stated he does not want any invasive interventions, including TVP. Pt also stated he would not want CPR performed or to be intubated. Patient was completely alert and oriented at the time he made this decision. Admitted to CCU for further monitoring. Dopamine gtt weaned off in CCU at 3:20 pm on 2/19. 89 yo male w h/o CAD (triple vessel disease diagnosed in 2016, not amenable to intervention), DM2, HTN who presents to the ED with dyspnea, chest pressure and dizziness. In the ED pt was noted to have ST depressions and elevated cardiac enzymes. He was initially loaded with ASA, Brilinta and started on heparin gtt for ACS. However he was then noted to be anemic (Hgb 7.7, last documented 11.7). Therefore heparin was stopped and pRBC transfusion was ordered. The pt subsequently developed worsening bradycardia, with junctional rhythm in the low 40s. A TTE revealed severe LV dysfunction. The pt's prior angiogram was reviewed with Dr. Rivera and Dr. Kendrick. Given his prior angiogram which showed severe CAD not amenable to intervention as well as his anemia, he was deemed not to be a candidate for urgent cath. Dopamine gtt was started for the patient's bradycardia and a TVP was recommended. However, pt stated he does not want any invasive interventions, including TVP. Pt also stated he would not want CPR performed or to be intubated. Patient was completely alert and oriented at the time he made this decision. Admitted to CCU for further monitoring. Dopamine gtt weaned off in CCU at 3:20 pm on 2/19.   May consent for PPM

## 2020-02-19 NOTE — CONSULT NOTE ADULT - SUBJECTIVE AND OBJECTIVE BOX
Patient is a 89yo male with past medical history of CAD (triple vessel disease diagnosed in 2016, not amenable to intervention), DM2, HTN who presents to the ED with progressive dyspnea, chest pressure and dizziness x 1-2 weeks. He was noted to have ST depressions and elevated cardiac enzymes. He was initially loaded with ASA, Brilinta and started on heparin gtt for ACS. However, when he was found to be anemic (Hgb 7.7, last documented 11.7), the heparin was stopped and pRBC transfusion was ordered. The patient subsequently developed worsening bradycardia, with junctional rhythm in the low 40s. A TTE revealed severe LV dysfunction. The pt's prior angiogram was reviewed with Dr. Rivera and Dr. Kendrick. Given his prior angiogram which showed severe CAD not amenable to intervention as well as his anemia, he was deemed not to be a candidate for urgent cath. Dopamine gtt was started for the patient's bradycardia and a TVP was recommended. However, he  stated he did not want any invasive interventions, including TVP. Pt also stated he would not want CPR performed or to be intubated. Patient was completely alert and oriented at the time he made this decision. Admitted to CCU for further monitoring. Dopamine gtt weaned off in CCU at 3:20 pm on .           PAST MEDICAL & SURGICAL HISTORY:  Essential hypertension  DM (diabetes mellitus)  CAD (coronary artery disease)  H/O coronary angioplasty: last , not amenable to PCI or CABG                        MEDICATIONS  (STANDING):  aspirin  chewable 81 milliGRAM(s) Oral daily  pantoprazole  Injectable 40 milliGRAM(s) IV Push two times a day    MEDICATIONS  (PRN):      FAMILY HISTORY:  Family history of coronary artery disease  Family history of diabetes mellitus      SOCIAL HISTORY:    CIGARETTES:    ALCOHOL:    REVIEW OF SYSTEMS:    CONSTITUTIONAL: No fever, weight loss, chills, shakes, or fatigue  EYES: No eye pain, visual disturbances, or discharge  ENMT:  No difficulty hearing, tinnitus, vertigo; No sinus or throat pain  NECK: No pain or stiffness  BREASTS: No pain, masses, or nipple discharge  RESPIRATORY: No cough, wheezing, hemoptysis, or shortness of breath  CARDIOVASCULAR: No chest pain, dyspnea, palpitations, dizziness, syncope, paroxysmal nocturnal dyspnea, orthopnea, or arm or leg swelling  GASTROINTESTINAL: No abdominal  or epigastric pain, nausea, vomiting, hematemesis, diarrhea, constipation, melena or bright red blood.  GENITOURINARY: No dysuria, nocturia, hematuria, or urinary incontinence  NEUROLOGICAL: No headaches, memory loss, slurred speech, limb weakness, loss of strength, numbness, or tremors  SKIN: No itching, burning, rashes, or lesions   LYMPH NODES: No enlarged glands  ENDOCRINE: No heat or cold intolerance, or hair loss  MUSCULOSKELETAL: No joint pain or swelling, muscle, back, or extremity pain  PSYCHIATRIC: No depression, anxiety, or difficulty sleeping  HEME/LYMPH: No easy bruising or bleeding gums  ALLERY AND IMMUNOLOGIC: No hives or rash.      Vital Signs Last 24 Hrs  T(C): 36.6 (2020 16:00), Max: 36.7 (2020 09:18)  T(F): 97.8 (2020 16:00), Max: 98 (2020 09:18)  HR: 95 (2020 18:00) (30 - 100)  BP: 120/73 (2020 18:00) (102/60 - 139/81)  BP(mean): 85 (2020 18:00) (83 - 101)  RR: 17 (2020 18:00) (16 - 24)  SpO2: 99% (2020 18:00) (72% - 100%)    PHYSICAL EXAM:    GENERAL: In no apparent distress, well nourished, and hydrated.  HEAD:  Atraumatic, Normocephalic  EYES: EOMI, PERRLA, conjunctiva and sclera clear  ENMT: No tonsillar erythema, exudates, or enlargement; Moist mucous membranes, Good dentition, No lesions  NECK: Supple and normal thyroid.  No JVD or carotid bruit.  Carotid pulse is 2+ bilaterally.  HEART: Regular rate and rhythm; No murmurs, rubs, or gallops.  PULMONARY: Clear to auscultation and perfusion.  No rales, wheezing, or rhonchi bilaterally.  ABDOMEN: Soft, Nontender, Nondistended; Bowel sounds present  EXTREMITIES:  2+ Peripheral Pulses, No clubbing, cyanosis, or edema  LYMPH: No lymphadenopathy noted  NEUROLOGICAL: Grossly nonfocal          INTERPRETATION OF TELEMETRY:    ECG:    I&O's Detail    2020 07:01  -  2020 18:52  --------------------------------------------------------  IN:  Total IN: 0 mL    OUT:    Voided: 200 mL  Total OUT: 200 mL    Total NET: -200 mL          LABS:                        8.2    7.79  )-----------( 237      ( 2020 14:45 )             27.2     02-19    139  |  99  |  47<H>  ----------------------------<  265<H>  5.0   |  19<L>  |  1.98<H>    Ca    9.8      2020 09:45    TPro  6.9  /  Alb  4.1  /  TBili  0.3  /  DBili  x   /  AST  49<H>  /  ALT  41  /  AlkPhos  76  02-19    CARDIAC MARKERS ( 2020 14:45 )  x     / x     / 84 u/L / 3.90 ng/mL / x          PT/INR - ( 2020 10:13 )   PT: 12.8 SEC;   INR: 1.12          PTT - ( 2020 10:13 )  PTT:39.9 SEC    BNP  I&O's Detail    2020 07:01  -  2020 18:52  --------------------------------------------------------  IN:  Total IN: 0 mL    OUT:    Voided: 200 mL  Total OUT: 200 mL    Total NET: -200 mL        Daily Height in cm: 162.56 (2020 12:28)    Daily Weight in k.8 (2020 12:28)    RADIOLOGY & ADDITIONAL STUDIES:  PREVIOUS DIAGNOSTIC TESTING:      ECHO  FINDINGS:    STRESS  FINDINGS:    CATHETERIZATION  FINDINGS:      ASSESSMENT:        RECOMMENDATIONS: Patient is a 89yo male with past medical history of CAD (triple vessel disease diagnosed in 2016, not amenable to intervention), DM2, HTN who presents to the ED with progressive dyspnea, chest pressure and dizziness x 1-2 weeks. He was noted to have ST depressions and elevated cardiac enzymes. He was initially loaded with ASA, Brilinta and started on heparin gtt for ACS. However, when he was found to be anemic (Hgb 7.7, last documented 11.7), the heparin was stopped and pRBC transfusion was ordered. The patient subsequently developed worsening bradycardia, with junctional rhythm in the low 40s. Now in atrial fibrillation with pauses to 5.45 seconds.  Echocardiogram revealed severe LV dysfunction. The patient's prior angiogram was reviewed with Dr. Rivera and Dr. Kendrick. Given his prior angiogram which showed severe CAD not amenable to intervention as well as his anemia, he was deemed not to be a candidate for urgent cath. Dopamine gtt was started for the patient's bradycardia and a TVP was recommended. However, he  stated he did not want any invasive interventions, including TVP. Pt also stated he would not want CPR performed or to be intubated and is now DNR.  Patient was completely alert and oriented at the time he made this decision. Admitted to CCU for further monitoring. Dopamine gtt weaned off in CCU at 3:20 pm on 2/19. Patient has remained in atrial fibrillation 80-90's. No prior AFib history.  Not on AV padma blockers Asked to speak with him regarding PPM.       PAST MEDICAL & SURGICAL HISTORY:  Essential hypertension  DM (diabetes mellitus)  CAD (coronary artery disease)  H/O coronary angioplasty: last 2016, not amenable to PCI or CABG      MEDICATIONS  (STANDING):  aspirin  chewable 81 milliGRAM(s) Oral daily  pantoprazole  Injectable 40 milliGRAM(s) IV Push two times a day    MEDICATIONS  (PRN):      FAMILY HISTORY:  Lives alone  Family history of coronary artery disease  Family history of diabetes mellitus      SOCIAL HISTORY:    CIGARETTES: no  DRUGS:  no  ALCOHOL:  no    REVIEW OF SYSTEMS:    CONSTITUTIONAL: No fever, weight loss, chills, shakes + fatigue and lower extremity weakness after walking short distances.   EYES: No eye pain, visual disturbances, or discharge  ENMT:  No difficulty hearing, tinnitus, vertigo; No sinus or throat pain  NECK: No pain or stiffness  BREASTS: No pain, masses, or nipple discharge  RESPIRATORY: No cough, wheezing, hemoptysis, progressive BILLINGS  CARDIOVASCULAR: No chest pain, paroxysmal nocturnal dyspnea, orthopnea + BILLINGS, palpitations,   GASTROINTESTINAL: No abdominal  or epigastric pain, nausea, vomiting, hematemesis, diarrhea, constipation, melena or bright red blood.  GENITOURINARY: No dysuria, nocturia, hematuria, or urinary incontinence  NEUROLOGICAL: No headaches, memory loss, slurred speech, limb weakness,  numbness, or tremors  SKIN: No itching, burning, rashes, or lesions   LYMPH NODES: No enlarged glands  ENDOCRINE: No heat or cold intolerance, or hair loss  MUSCULOSKELETAL: No joint pain or swelling, muscle, back, or extremity pain  PSYCHIATRIC: No depression, anxiety, or difficulty sleeping  HEME/LYMPH: No easy bruising or bleeding gums  ALLERGY AND IMMUNOLOGIC: No hives or rash.      Vital Signs Last 24 Hrs  T(C): 36.6 (19 Feb 2020 16:00), Max: 36.7 (19 Feb 2020 09:18)  T(F): 97.8 (19 Feb 2020 16:00), Max: 98 (19 Feb 2020 09:18)  HR: 95 (19 Feb 2020 18:00) (30 - 100)  BP: 120/73 (19 Feb 2020 18:00) (102/60 - 139/81)  BP(mean): 85 (19 Feb 2020 18:00) (83 - 101)  RR: 17 (19 Feb 2020 18:00) (16 - 24)  SpO2: 99% (19 Feb 2020 18:00) (72% - 100%)    PHYSICAL EXAM:    GENERAL: In no apparent distress, well nourished, and hydrated. Pallor  HEAD:  Atraumatic, Normocephalic  EYES: EOMI, PERRLA, conjunctiva and sclera clear  ENMT: No tonsillar erythema, exudates, or enlargement; Dry mucous membranes, Poor dentition, No lesions + upper and lower partial dentures  NECK: Supple and normal thyroid.  No JVD or carotid bruit.    HEART: Irregular rate and rhythm; No murmurs, rubs, or gallops appreciated  PULMONARY: Decreased breath sounds at bases bilaterally.   No rales, wheezing, or rhonchi bilaterally.  ABDOMEN: Soft, Nontender, Nondistended; Bowel sounds present  EXTREMITIES:  1+ pedal pulses  No clubbing, cyanosis or edema  LYMPH: No lymphadenopathy noted  NEUROLOGICAL: Grossly nonfocal          INTERPRETATION OF TELEMETRY:  Atrial fibrillation with RBBB Rate 80-90's Multiple pauses to 5.45 seconds.     ECG:  Atrial fibrillation with junctional escape 50 bpm.  LVH  ST depression inferolateral leads. QRS 138ms.     LABS:                        8.2    7.79  )-----------( 237      ( 19 Feb 2020 14:45 )             27.2     02-19    139  |  99  |  47<H>  ----------------------------<  265<H>  5.0   |  19<L>  |  1.98<H>    Ca    9.8      19 Feb 2020 09:45    TPro  6.9  /  Alb  4.1  /  TBili  0.3  /  DBili  x   /  AST  49<H>  /  ALT  41  /  AlkPhos  76  02-19    CARDIAC MARKERS ( 19 Feb 2020 14:45 )  x     / x     / 84 u/L / 3.90 ng/mL / x          PT/INR - ( 19 Feb 2020 10:13 )   PT: 12.8 SEC;   INR: 1.12          PTT - ( 19 Feb 2020 10:13 )  PTT:39.9 SEC    RADIOLOGY & ADDITIONAL STUDIES:  PREVIOUS DIAGNOSTIC TESTING:      ECHO  FINDINGS:  Patient name: MIKEY ANDRE  YOB: 1929   Age: 89 (M)   MR#: [id number]  Study Date: 2/19/2020  Location: O/PSonographer: Valentin Pascual [facility]  2nd Sonographer: Tara Anne M.D.  Study quality: Technically good  Referring Physician: Not Available Doctor, MD  Blood Pressure: 127/82 mmHg  Height: 163 cm  Weight: 64 kg  BSA: 1.7 m2  ------------------------------------------------------------------------  PROCEDURE: Transthoracic echocardiogram with 2-D, M-Mode  and complete spectral andcolor flow Doppler.  INDICATION: Other chest pain (R07.89)  ------------------------------------------------------------------------  DIMENSIONS:  Dimensions:     Normal Values:  LA:     3.9 cm    2.0 - 4.0 cm  Ao:     2.9 cm    2.0 - 3.8 cm  SEPTUM:1.2 cm    0.6 - 1.2 cm  PWT:    1.2 cm    0.6 - 1.1 cm  LVIDd:    ---     3.0 - 5.6 cm  LVIDs:    ---     1.8 - 4.0 cm  ------------------------------------------------------------------------  OBSERVATIONS:  Mitral Valve: Tethered mitral valve leaflets with normal  opening. Moderate-severe mitral regurgitation.  Aortic Root: Normal aortic root.  Aortic Valve: Calcified trileaflet aortic valve with normal  opening. Mild aortic regurgitation.  Left Atrium: Severely dilated left atrium.  LA volume index  = 67 cc/m2.  Left Ventricle: Severe global left ventricular systolic  dysfunction. Normal left ventricular internal dimensions  and wall thicknesses.  Right Heart: Severe right atrial enlargement. Right  ventricular enlargement with decreased right ventricular  systolic function. Normal tricuspid valve. Moderate  tricuspid regurgitation. Normal pulmonic valve.  Pericardium/PleuraNormal pericardium with no pericardial  effusion.  Hemodynamic: Estimated right ventricular systolic pressure  equals 50 mm Hg, assuming right atrial pressure equals 10  mm Hg, consistent with moderate pulmonary hypertension.  ------------------------------------------------------------------------  CONCLUSIONS:  1. Tethered mitral valve leaflets with normal opening.  Moderate-severe mitral regurgitation.  2. Calcified trileaflet aortic valve with normal opening.  Mild aortic regurgitation.  3. Severe global left ventricular systolic dysfunction.  4. Right ventricular enlargement with decreased right  ventricular systolic function.  5. Normal tricuspid valve. Moderate tricuspid  regurgitation.  6. Estimated pulmonary artery systolic pressure equals 50  mm Hg, assuming right atrial pressure equals 10  mm Hg,  consistent with moderate pulmonary hypertension.  ------------------------------------------------------------------------  Confirmed on  2/19/2020 - 13:12:47 by Jada Burns MD  ------------------------------------------------------------------------          CATHETERIZATION  FINDINGS:  VENTRICLES: No left ventriculogram was performed.  CORONARY VESSELS: The coronary circulation is right dominant.  LM:   --  LM: Normal.  LAD:   --  Mid LAD: There was a 60 % stenosis. In a second lesion, there  was a 99 % stenosis. The lesion was eccentric andmoderately calcified.  --  Distal LAD: There was a diffuse 50 % stenosis. The lesion was  calcified.  --  D1: There was a 75 % stenosis in the middle third of the vessel  segment. There was a small vascular territory distal to the lesion.  CX:   --  Proximal circumflex: There was a 75 % stenosis. The lesion was  complex.  RCA:   --  Proximal RCA: There was a 100 % stenosis. There was poor  collateral blood supply to the distal myocardium.  COMPLICATIONS: No complications occurred during the cath lab visit.  DIAGNOSTIC IMPRESSIONS: Patient has severe diffsue coronary arterey disea  involving all three coronaries, no ideal for intervention or CABG.  DIAGNOSTIC RECOMMENDATIONS: Aggressive medical therapy and risk factor  modification.  Prepared and signed by  Lior Rivera M.D.

## 2020-02-19 NOTE — H&P ADULT - NSHPREVIEWOFSYSTEMS_GEN_ALL_CORE
REVIEW OF SYSTEMS:    CONSTITUTIONAL: +weakness, no fevers or chills  EYES/ENT: No visual changes;  No vertigo or throat pain   NECK: No pain or stiffness  RESPIRATORY: +SOB as above; No cough, wheezing, hemoptysis;  CARDIOVASCULAR: + chest pain/palpitations, +cold fingers   GASTROINTESTINAL: No abdominal or epigastric pain. No nausea, vomiting, or hematemesis; No diarrhea or constipation.  GENITOURINARY: No dysuria, frequency or hematuria  NEUROLOGICAL: No numbness or weakness  SKIN: No itching, rashes

## 2020-02-19 NOTE — ED PROVIDER NOTE - OBJECTIVE STATEMENT
89 y.o male pmhx of CAD w/ triple vessel disease, DM, HTN coming in with intermittent chest pressure since last night and feeling like he was going to pass out. States last cath 25 years ago and one was attempted 2 years ago but was unable to complete. Denies fevers, chills, cough, sob, nausea, vomiting, abdominal pain, numbness, tingling, weakness.

## 2020-02-19 NOTE — H&P ADULT - ASSESSMENT
89 yo male w h/o CAD (triple vessel disease diagnosed in 2016, not amenable to intervention), DM2, HTN, admitted to CCU for NSTEMI, elevated cardiac enzymes, bradycardia to 30s with junctional rhythm, and anemia. Pending pacemaker and palliative care meeting, as pt is not a candidate for PCI or CABG.     #Cardiac    NSTEMI  -Holding heparin in setting of GI bleed, anemia  -c/w ASA, brilinta   -not amenable to PCI or CABG    Bradycardia  -holding beta blocker  -improved HR in CCU  -pt declined TVP  -pending PPM placement per EP    #Heme  Anemia  -s/p 1 u Type O neg in ED  -transfuse to keep Hgb>8  -active T&S  -FOBT positive see below     #GI  GI bleed  -started protonix 40 mg IV BID  -GI consult   -currently HD stable    #Endo  DM2  -A1C, ISS, FSBG checks    #Dispo  -DNR/DNI  -GOC meeting at 1 pm 2/20, palliative consulted 89 yo male w h/o CAD (triple vessel disease diagnosed in 2016, not amenable to intervention), DM2, HTN, admitted to CCU for NSTEMI, elevated cardiac enzymes, bradycardia to 30s with junctional rhythm, and anemia. Pending pacemaker and palliative care meeting, as pt is not a candidate for PCI or CABG.     #Cardiac    NSTEMI/ ACS   -Holding heparin in setting of GI bleed, anemia  -c/w ASA, brilinta   -not amenable to PCI or CABG    Bradycardia  -holding beta blocker  -improved HR in CCU  -pt declined TVP  -pending PPM placement per EP  check TFT's     #Heme  Anemia  -s/p 1 u Type O neg in ED  -transfuse to keep Hgb>8  -active T&S  -FOBT positive see below     #GI  GI bleed  -started protonix 40 mg IV BID  -GI consult   -currently HD stable    #Endo  DM2  -A1C, ISS, FSBG checks    #Dispo  -DNR/DNI  -GOC meeting at 1 pm 2/20, palliative consulted

## 2020-02-19 NOTE — ED PROVIDER NOTE - CARE PLAN
Principal Discharge DX:	NSTEMI (non-ST elevated myocardial infarction)  Secondary Diagnosis:	CAD (coronary artery disease)  Secondary Diagnosis:	DM (diabetes mellitus) Principal Discharge DX:	NSTEMI (non-ST elevated myocardial infarction)  Secondary Diagnosis:	CAD (coronary artery disease)  Secondary Diagnosis:	DM (diabetes mellitus)  Secondary Diagnosis:	GI bleed

## 2020-02-19 NOTE — H&P ADULT - NSHPPHYSICALEXAM_GEN_ALL_CORE
T(C): 36.2 (02-19-20 @ 12:28), Max: 36.7 (02-19-20 @ 09:18)  HR: 88 (02-19-20 @ 15:00) (30 - 100)  BP: 139/81 (02-19-20 @ 15:00) (102/60 - 139/81)  RR: 22 (02-19-20 @ 15:00) (16 - 22)  SpO2: 95% (02-19-20 @ 14:00) (72% - 100%)    Appearance: Ill-appearing	  HEENT:   Normal oral mucosa, 	  Lymphatic: No lymphadenopathy  Cardiovascular: Irregular irregular, No murmurs, 1+ b/l LE edema  Respiratory: Slightly diminished at bases	  Psychiatry: A & O x 3  Gastrointestinal:  Soft, Non-tender, + BS	  Skin: No rashes, No ecchymoses, No cyanosis	  Neurologic: Non-focal  Extremities: Normal range of motion, No clubbing, cyanosis or edema  Vascular: Peripheral pulses palpable 2+ bilaterally T(C): 36.2 (02-19-20 @ 12:28), Max: 36.7 (02-19-20 @ 09:18)  HR: 88 (02-19-20 @ 15:00) (30 - 100)  BP: 139/81 (02-19-20 @ 15:00) (102/60 - 139/81)  RR: 22 (02-19-20 @ 15:00) (16 - 22)  SpO2: 95% (02-19-20 @ 14:00) (72% - 100%)    Appearance: Ill-appearing	  HEENT:   Normal oral mucosa, 	  Lymphatic: No lymphadenopathy  Cardiovascular: Irregular irregular, No murmurs, 1+ b/l LE edema  Respiratory: Slightly diminished at bases (right greater than left) 	  Psychiatry: A & O x 3  Gastrointestinal:  Soft, Non-tender, + BS	  Skin: No rashes, No ecchymoses, No cyanosis	  Neurologic: Non-focal  Extremities: Normal range of motion, No clubbing, cyanosis or edema  Vascular: Peripheral pulses palpable 2+ bilaterally

## 2020-02-19 NOTE — CONSULT NOTE ADULT - ATTENDING COMMENTS
Patient has pauses > 5 seconds, severe LV dysfunction with ischemic heart disease, and requires beta blockers. Recommend PPM to allow beta blockers. Given patient's overall condition, will avoid ICD. Patient has pauses > 5 seconds, severe LV dysfunction with ischemic heart disease, dizziness, PAF and requires beta blockers. Recommend PPM to allow beta blockers. Given patient's overall condition, will avoid ICD.

## 2020-02-19 NOTE — H&P ADULT - NSICDXPASTMEDICALHX_GEN_ALL_CORE_FT
PAST MEDICAL HISTORY:  CAD (coronary artery disease)     DM (diabetes mellitus)     Essential hypertension

## 2020-02-19 NOTE — ED ADULT TRIAGE NOTE - CHIEF COMPLAINT QUOTE
Pt states he was up all night with chest pressure that didn't resolve. Pt also c/o dizziness and a feeling he is going to "pass out". Pt states he cant walk far because his legs and hips hurt  Pt appears to be very pale--

## 2020-02-19 NOTE — ED PROVIDER NOTE - FAMILY HISTORY
Family history of diabetes mellitus     Mother  Still living? Unknown  Family history of coronary artery disease, Age at diagnosis: Age Unknown

## 2020-02-19 NOTE — CONSULT NOTE ADULT - SUBJECTIVE AND OBJECTIVE BOX
CHIEF COMPLAINT: SOB since last night    HISTORY OF PRESENT ILLNESS: 89 yo male w h/o CAD (triple vessel disease diagnosed in 2016, not amenable to intervention), DM2, HTN who presents to the ED with dyspnea, chest pressure and dizziness. Pt reports symptoms first started last night, and persisted today he came to the ED. He had trouble sleeping because he could not catch his breath. He describes the chest pressure as mild and non-radiating. He feels dizzy but denies any syncope. Prior to yesterday the pt was in his usual state of health. He reports compliance with his home medications which include: ASA 81, Nifedipine 60, Propranolol 80, Simvastatin 40, Isosorbide 120.      Allergies    No Known Allergies    Intolerances    	    MEDICATIONS:  aspirin  chewable 81 milliGRAM(s) Oral daily  heparin  Infusion.  Unit(s)/Hr IV Continuous <Continuous>  heparin  Injectable 4000 Unit(s) IV Push every 6 hours PRN                  PAST MEDICAL & SURGICAL HISTORY:  Essential hypertension  DM (diabetes mellitus)  CAD (coronary artery disease)  H/O coronary angioplasty      FAMILY HISTORY:  Family history of coronary artery disease  Family history of diabetes mellitus      SOCIAL HISTORY:    Denies tobacco, alcohol or drug use.      REVIEW OF SYSTEMS:  General: no fatigue/malaise, weight loss/gain.  Skin: no rashes.  Ophthalmologic: no blurred vision, no loss of vision. 	  ENT: no sore throat, rhinorrhea, sinus congestion.  Respiratory: SOB as noted in HPI  Gastrointestinal:  no N/V/D, no melena/hematemesis/hematochezia.  Genitourinary: no dysuria/hesitancy or hematuria.  Musculoskeletal: no myalgias or arthralgias.  Neurological: Dizziness as noted in HPI	  Psychiatric: no unusual stress/anxiety.   Hematology/Lymphatics: no unusual bleeding, bruising and no lymphadenopathy.  Endocrine: no unusual thirst.   All others negative except as stated above and in HPI.    PHYSICAL EXAM:  T(C): 36.7 (02-19-20 @ 09:18), Max: 36.7 (02-19-20 @ 09:18)  HR: 48 (02-19-20 @ 11:45) (30 - 90)  BP: 102/60 (02-19-20 @ 11:45) (102/60 - 128/72)  RR: 18 (02-19-20 @ 11:45) (16 - 18)  SpO2: 99% (02-19-20 @ 11:45) (92% - 100%)  Wt(kg): --  I&O's Summary      Appearance: Ill-appearing	  HEENT:   Normal oral mucosa, PERRL, EOMI	  Lymphatic: No lymphadenopathy  Cardiovascular: Irregular irregular, No murmurs, 1+ b/l LE edema  Respiratory: Slightly diminished at bases	  Psychiatry: A & O x 3  Gastrointestinal:  Soft, Non-tender, + BS	  Skin: No rashes, No ecchymoses, No cyanosis	  Neurologic: Non-focal  Extremities: Normal range of motion, No clubbing, cyanosis or edema  Vascular: Peripheral pulses palpable 2+ bilaterally        LABS:	 	    CBC Full  -  ( 19 Feb 2020 09:45 )  WBC Count : 8.63 K/uL  Hemoglobin : 7.7 g/dL  Hematocrit : 26.6 %  Platelet Count - Automated : 300 K/uL  Mean Cell Volume : 84.2 fL  Mean Cell Hemoglobin : 24.4 pg  Mean Cell Hemoglobin Concentration : 28.9 %  Auto Neutrophil # : 7.02 K/uL  Auto Lymphocyte # : 0.82 K/uL  Auto Monocyte # : 0.69 K/uL  Auto Eosinophil # : 0.04 K/uL  Auto Basophil # : 0.03 K/uL  Auto Neutrophil % : 81.4 %  Auto Lymphocyte % : 9.5 %  Auto Monocyte % : 8.0 %  Auto Eosinophil % : 0.5 %  Auto Basophil % : 0.3 %    02-19    139  |  99  |  47<H>  ----------------------------<  265<H>  5.0   |  19<L>  |  1.98<H>    Ca    9.8      19 Feb 2020 09:45    TPro  6.9  /  Alb  4.1  /  TBili  0.3  /  DBili  x   /  AST  49<H>  /  ALT  41  /  AlkPhos  76  02-19      proBNP:   Lipid Profile:   HgA1c:   TSH:       CARDIAC MARKERS:            TELEMETRY: 	  Presently A-fib w junctional rhythm in the 40s  ECG:  	A-fib HR 91 ST elevation aVR and V1, ST depression in I, II, aVL, V2-V6  RADIOLOGY:  OTHER: 	    PREVIOUS DIAGNOSTIC TESTING:    [ ] Echocardiogram: < from: Transthoracic Echocardiogram (11.16.16 @ 14:39) >  DIMENSIONS:  Dimensions:     Normal Values:  LA:     4.6 cm    2.0 - 4.0 cm  Ao:     3.0 cm    2.0 - 3.8 cm  SEPTUM: 0.8 cm    0.6 - 1.2 cm  PWT:0.8 cm    0.6 - 1.1 cm  LVIDd:  4.4 cm    3.0 - 5.6 cm  LVIDs:  2.9 cm    1.8 - 4.0 cm  Derived Variables:  LVMI: 61 g/m2  RWT: 0.36  Fractional short: 34 %  Ejection Fraction (Teicholtz): 63 %  ------------------------------------------------------------------------  OBSERVATIONS:  Mitral Valve: Mitral annular calcification, otherwise  normal mitral valve. Minimal mitral regurgitation.  Aortic Root: Normal aortic root.  Aortic Valve: Calcified trileaflet aortic valve with normal  opening. Minimal aortic regurgitation.  Left Atrium: Mild left atrial enlargement.  Left Ventricle: Endocardium not well visualized; grossly  normal left ventricular systolic function. Normal left  ventricular internal dimensions and wall thicknesses.  Right Heart: Normal right atrium. The right ventricle is  not well visualized; grossly normal right ventricular  systolic function. Normal tricuspid valve.  Minimal  tricuspid regurgitation. Normal pulmonic valve. Minimal  pulmonic regurgitation.  Pericardium/PleuraNormal pericardium with no pericardial  effusion.  ------------------------------------------------------------------------  CONCLUSIONS:  1. Mitral annular calcification, otherwise normal mitral  valve. Minimal mitral regurgitation.  2. Mild left atrial enlargement.  3. Normal left ventricular internal dimensions and wall  thicknesses.  4. Endocardium not well visualized; grossly normal left  ventricular systolic function.  5. The right ventricle is not well visualized; grossly  normal right ventricular systolic function.    < end of copied text >    [ ]  Catheterization: < from: Cardiac Cath Lab - Adult (11.17.16 @ 13:49) >  VENTRICLES: No left ventriculogram was performed.  CORONARY VESSELS: The coronary circulation is right dominant.  LM:   --  LM: Normal.  LAD:   --  Mid LAD: There was a 60 % stenosis. In a second lesion, there  was a 99 % stenosis. The lesion was eccentric andmoderately calcified.  --  Distal LAD: There was a diffuse 50 % stenosis. The lesion was  calcified.  --  D1: There was a 75 % stenosis in the middle third of the vessel  segment. There was a small vascular territory distal to the lesion.  CX:   --  Proximal circumflex: There was a 75 % stenosis. The lesion was  complex.  RCA:   --  Proximal RCA: There was a 100 % stenosis. There was poor  collateral blood supply to the distal myocardium.  COMPLICATIONS: No complications occurred during the cath lab visit.  DIAGNOSTIC IMPRESSIONS: Patient has severe diffsue coronary arterey disea  involving all three coronaries, no ideal for intervention or CABG.  DIAGNOSTIC RECOMMENDATIONS: Aggressive medical therapy and risk factor  modification.    < end of copied text >    [ ] Stress Test:

## 2020-02-19 NOTE — H&P ADULT - NSHPLABSRESULTS_GEN_ALL_CORE
8.2    7.79  )-----------( 237      ( 19 Feb 2020 14:45 )             27.2       02-19    139  |  99  |  47<H>  ----------------------------<  265<H>  5.0   |  19<L>  |  1.98<H>    Ca    9.8      19 Feb 2020 09:45    TPro  6.9  /  Alb  4.1  /  TBili  0.3  /  DBili  x   /  AST  49<H>  /  ALT  41  /  AlkPhos  76  02-19                  PT/INR - ( 19 Feb 2020 10:13 )   PT: 12.8 SEC;   INR: 1.12          PTT - ( 19 Feb 2020 10:13 )  PTT:39.9 SEC    Lactate Trend      CARDIAC MARKERS ( 19 Feb 2020 14:45 )  x     / x     / 84 u/L / 3.90 ng/mL / x            CAPILLARY BLOOD GLUCOSE      < from: Xray Chest 1 View- PORTABLE-Urgent (02.19.20 @ 10:41) >    EXAM:  Peripheral upright frontal chest from 2/19/2020 at 1041. Compared to prior study from 11/23/2016.    Projection lordotic.     IMPRESSION:  Slightly blunted appearing right CP angle suggesting small pleural reaction. Sharp appearing left CP angle Clear remaining visualized lungs. No pneumothorax.    Stable slightly prominent appearing cardiac and mediastinal silhouettes.    Rightward lower tracheal deviation due to pressure effect from adjacent aortic arch. Midline upper trachea.    Generalized osteopenia and mild spinal degenerative change again noted.    < end of copied text >    < from: Transthoracic Echocardiogram (02.19.20 @ 12:19) >    CONCLUSIONS:  1. Tethered mitral valve leaflets with normal opening.  Moderate-severe mitral regurgitation.  2. Calcified trileaflet aortic valve with normal opening.  Mild aortic regurgitation.  3. Severe global left ventricular systolic dysfunction.  4. Right ventricular enlargement with decreased right  ventricular systolic function.  5. Normal tricuspid valve. Moderate tricuspid  regurgitation.  6. Estimated pulmonary artery systolic pressure equals 50  mm Hg, assuming right atrial pressure equals 10  mm Hg,  consistent with moderate pulmonary hypertension.    < end of copied text >

## 2020-02-19 NOTE — CONSULT NOTE ADULT - ASSESSMENT
89 yo male w h/o CAD (triple vessel disease diagnosed in 2016, not amenable to intervention), DM2, HTN who presents to the ED with dyspnea, chest pressure and dizziness. In the ED pt was noted to have ST depressions and elevated cardiac enzymes. He was initially loaded with ASA, Brilinta and started on heparin gtt for ACS. However he was then noted to be anemic (Hgb 7.7, last documented 11.7). Therefore heparin was stopped and pRBC transfusion was ordered. The pt subsequently developed worsening bradycardia, with junctional rhythm in the low 40s. A TTE revealed severe LV dysfunction. The pt's prior angiogram was reviewed with Dr. Rivera and Dr. Kendrick. Given his prior angiogram which showed severe CAD not amenable to intervention as well as his anemia, he was deemed not to be a candidate for urgent cath. Dopamine gtt was started for the patient's bradycardia and a TVP was recommended. However, pt stated he does not want any invasive interventions, including TVP. Pt also stated he would not want CPR performed or to be intubated. Patient was completely alert and oriented at the time he made this decision.    - Will plan to transfuse pRBC for anemia  - c/w Dopamine gtt for bradycardia and LV dysfunction  - Admit to CCU    Ahsan Cha MD  Cardiology Fellow  [phone]  All Cardiology service information can be found 24/7 on [email or web address], password: [email or web address]

## 2020-02-19 NOTE — ED PROVIDER NOTE - CRITICAL CARE PROVIDED
documentation/direct patient care (not related to procedure)/interpretation of diagnostic studies/consultation with other physicians/I have personally provided the amount of critical care time documented below, excluding time spent on separate procedures. I spoke with several family member(s). I spoke with the consulting service (Cards)./additional history taking/consult w/ pt's family directly relating to pts condition

## 2020-02-19 NOTE — ED ADULT NURSE NOTE - OBJECTIVE STATEMENT
89M hx of CAD presents with intermittent chest pressure, no provoking or alleviating factors. Pt previously had a cath 4 years ago and found to have blockages, although they were unable to fix it. Pt denies cough, SOB, fever, chills.   Pt AOx3, HR fluctuating between NSR and irregular. Resp even and unlabored, lungs clear, + b/l lower extremity pitting edema. 89M hx of CAD presents with intermittent chest pressure, no provoking or alleviating factors. Pt previously had a cath 4 years ago and found to have blockages, although they were unable to fix it. Pt denies cough, SOB, fever, chills.   Pt AOx3, HR fluctuating between NSR and irregular. Resp even and unlabored, lungs clear, + b/l lower extremity pitting edema.    @1035 Pt had 5.5 second pause in heart beat, became symptomatic, near syncope. Zoll and pacer pads applied. Pt moved to Tr C. Cards and team aware.

## 2020-02-19 NOTE — ED PROVIDER NOTE - GASTROINTESTINAL NEGATIVE STATEMENT, MLM
How Severe Is Your Cyst?: moderate
Is This A New Presentation, Or A Follow-Up?: Cyst
no abdominal pain, no bloating, no constipation, no diarrhea, no nausea and no vomiting.

## 2020-02-19 NOTE — ED PROVIDER NOTE - CLINICAL SUMMARY MEDICAL DECISION MAKING FREE TEXT BOX
Raheel: Older man, h/o CAD, prior failed angioplasty, deemed not to be a surgical candidate, had 3-vessel disease. P/w CP. Appears comfortable. VSS. EKG: lateral ST depressions concerning for ischemia, new since 2016. Concern for ACS (USA or NSTEMI). Plan: Serial EKG, trop, heparin drip, Brilinta, labs, CXR, Cards consult, admit.

## 2020-02-19 NOTE — ED PROVIDER NOTE - ATTENDING CONTRIBUTION TO CARE
I performed a face-to-face evaluation of the patient and performed a history and physical examination. I agree with the history and physical examination.    Raheel: Older man, h/o CAD, prior failed angioplasty, deemed not to be a surgical candidate, had 3-vessel disease. P/w CP. Appears comfortable. VSS. EKG: lateral ST depressions concerning for ischemia, new since 2016. Concern for ACS (USA or NSTEMI). Plan: Serial EKG, trop, heparin drip, Brilinta, labs, CXR, Cards consult, admit.

## 2020-02-19 NOTE — ED PROVIDER NOTE - PROGRESS NOTE DETAILS
Raheel: Repeat EKG essentially unchanged. nelson fellow at bedside, recommend medical management with asa/brilinta/heparin and discussing with Dr. Pineda Raheel: Hb 7.7 (has not been this low in the past), likely slow GIB (brown, soft, heme+ stool). NSTEMI may be 2/2 low O2-carrying capacity. Will give O negative blood. Intermittent bradycardia, treated w/ atropine and dopamine drip. Raheel: DNR/DNI: I discussed with patient (of sound mind), patient's son, and patient's grandaughter (a critical care trauma surgeon), who are all in agreement. Patient declines getting a pacemaker, also.

## 2020-02-19 NOTE — ED PROVIDER NOTE - EKG ADDITIONAL INFORMATION FREE TEXT
Raheel: No hyper-acute T waves, no malignant dysrhythmia. Does have lateral ST depressions concerning for ischemia, new since 2016.

## 2020-02-20 NOTE — CONSULT NOTE ADULT - ASSESSMENT
Impression:  1) FOBT + stool - with drop in hemoglobin from 11 to 7.8 but no over bleeding.  DDx is broad including gastritis, malignancy, PUD, angioectasia.  Patient currently not agreeable to endoscopic procedures at this time    Recommendations:   - monitor hemoglobin   - transfuse as needed   - active type and screen   - supportive care per primary   - please call GI back with any further questions Impression:  1) FOBT + stool - with drop in hemoglobin from 11 to 7.8 but no overt bleeding.  DDx is broad including gastritis, malignancy, PUD, angioectasia.  Patient currently not agreeable to endoscopic procedures at this time    Recommendations:   - monitor hemoglobin   - transfuse as needed   - active type and screen   - supportive care per primary   - please call GI back with any further questions

## 2020-02-20 NOTE — CHART NOTE - NSCHARTNOTEFT_GEN_A_CORE
Type of Procedure: Implantation of dual chamber pacemaker  Licensed independent practitioner: Vincent Murphy MD  Assistant: none  Description of procedure: sterile conditions, antibiotic coverage, subclavian venous access, ventricular and atrial lead implanted, prepectoral pocket copiously irrigated with antibiotic solution and closed in 3 layers  Findings of procedure: normal pacing, sensing and lead integrity  Estimated blood loss: < 10 cc  Specimen removed: none  Preoperative Dx: sinus node dysfunction  Postoperative Dx: sinus node dysfunction  Complications: none  Anesthesia type: local with sedation  No heparin for 24 hours and then reassess.    Vincent Murphy MD. Type of Procedure: Implantation of dual chamber pacemaker  Licensed independent practitioner: Vincent Murphy MD  Assistant: none  Description of procedure: sterile conditions, antibiotic coverage, subclavian venous access, ventricular and atrial lead implanted, prepectoral pocket copiously irrigated with antibiotic solution and closed in 3 layers  Findings of procedure: normal pacing, sensing and lead integrity  Estimated blood loss: < 10 cc  Specimen removed: none  Preoperative Dx: symptomatic sinus node dysfunction with pause of > 5 seconds  Postoperative Dx: symptomatic sinus node dysfunction with pause of > 5 seconds  Complications: none  Anesthesia type: local with sedation  No heparin for 24 hours and then reassess.    Vincent Murphy MD.

## 2020-02-20 NOTE — CONSULT NOTE ADULT - SUBJECTIVE AND OBJECTIVE BOX
HPI:  89 yo male w h/o CAD (triple vessel disease diagnosed in 2016, not amenable to intervention), DM2, HTN who is admitted for a pacemaker insertion and GI bleed eval.    Palliative consulted for GOC    =================================================================================================  2020    - I met with the patient in the morning and again in the afternoon with Michael (son) at bedside, and Emre (son) on speakerphone  - Pls refer to [facility] document in the chart  - In summary: the patient re-affirmed the DNR, DNI status. I broached hospice with them, but with all the workup being done, more extensive discussion is deferred for now. Emre is very aware of what hospice is (BARAK  while he was in hospice).    =================================================================================================  ----- PERTINENT PMH/ SXH/ FHX  Essential hypertension  DM (diabetes mellitus)  CAD (coronary artery disease)    H/O coronary angioplasty  No significant past surgical history    FAMILY HISTORY:  Family history of coronary artery disease  Family history of diabetes mellitus      ----- SOCIAL HISTORY:   Significant other/partner: Yes [ ]  No [ ] Children:  Yes X[ ]  No [ ] Congregation/Spirituality:  Substance hx: Yes[ ]  No [X ]   Tobacco hx:  Yes [ ] No [X ]   Alcohol hx: Yes [ ] No [X ]   Home Opioid hx:  [ ] I-Stop Reference No:  Living Situation: [X ]Home  [ ]Long term care  [ ]Rehab [ ]Other    ----- ADVANCE DIRECTIVES:    DNR  Yes  MOLST  [ ]  Living Will  [ ]   DECISION MAKER(s):  [ ] Health Care Proxy(s)  [ ] Surrogate(s)  [ ] Guardian           Name(s): Phone Number(s):    ----- BASELINE (I)ADL(s) (prior to admission):  [place]: [ ]Total  [ ] Moderate [ ]Dependent  Palliative Performance Status Version 2:      =================================================================================================  -----MEDICATIONS AND ALLERGIES:  Allergies    No Known Allergies    Intolerances    MEDICATIONS  (STANDING):  pantoprazole  Injectable 40 milliGRAM(s) IV Push two times a day    MEDICATIONS  (PRN):      =================================================================================================  ----- SYMPTOM ASSESSMENT: [ ]Unable to obtain due to poor mentation   Source if other than patient:  [ ]Family   [ ]Team     Pain (Impact on QOL):  n/a  Location -   Severity -        Minimal acceptable level (0-10 scale):  Quality:   Onset:   Duration:                 Aggravating factors -  Relieving factors -  Radiation -    PAIN AD Score:   http://geriatrictoolkit.[employer].edu/cog/painad.pdf (press ctrl +  left click to view)    Dyspnea:  Yes [ ] No [ ] - [ ]Mild [ ]Moderate [ ]Severe  Anxiety:    Yes [ ] No [ ] - [ ]Mild [ ]Moderate [ ]Severe  Fatigue:    Yes [ ] No [ ] - [ ]Mild [ ]Moderate [ ]Severe  Nausea:    Yes [ ] No [ ] - [ ]Mild [ ]Moderate [ ]Severe                         Loss of appetite: Yes [ ] No [ ] - [ ]Mild [ ]Moderate [ ]Severe             Constipation:  Yes [ ] No [ ] - [ ]Mild [ ]Moderate [ ]Severe  Grief:  Yes [ ] No [ ]     Other Symptoms:  [X ]All other review of systems negative       =================================================================================================  ----- PHYSICAL EXAM:  Vital Signs Last 24 Hrs  T(C): 36.9 (2020 16:00), Max: 36.9 (2020 16:00)  T(F): 98.4 (2020 16:00), Max: 98.4 (2020 16:00)  HR: 66 (2020 16:00) (57 - 96)  BP: 129/85 (2020 16:00) (104/90 - 175/77)  BP(mean): 96 (2020 16:00) (72 - 104)  RR: 23 (2020 16:00) (12 - 25)  SpO2: 94% (2020 16:00) (77% - 100%) I&O's Summary    2020 07:  -  2020 07:00  --------------------------------------------------------  IN: 100 mL / OUT: 550 mL / NET: -450 mL    2020 07:01  -  2020 16:57  --------------------------------------------------------  IN: 310 mL / OUT: 1125 mL / NET: -815 mL    GEN: Awake, alert, NAD, lying in bed  HEENT: Grossly normal, no secretions, anicteric sclerae  PULM: Comfortable work of breathing, clear BS  CV: RRR, normal S1 and S2, no murmurs, Regular pulse  ABD: Soft, non-tender, +BS  EXT: No edema  PSYCH: Appropriate mood and affect  NEURO: No gross deficits, no tremors  SKIN: No rashes or lesions on exposed skin, No jaundice      =================================================================================================  ----- LABS:                        9.6    7.54  )-----------( 243      ( 2020 13:23 )             30.9       137  |  102  |  47<H>  ----------------------------<  166<H>  4.6   |  24  |  1.98<H>    Ca    9.0      2020 05:45  Phos  3.8       Mg     1.9         TPro  6.2  /  Alb  3.5  /  TBili  0.4  /  DBili  x   /  AST  28  /  ALT  45<H>  /  AlkPhos  70    PT/INR - ( 2020 10:13 )   PT: 12.8 SEC;   INR: 1.12          PTT - ( 2020 10:13 )  PTT:39.9 SEC      -----RADIOLOGY & ADDITIONAL STUDIES:    PROTEIN CALORIE MALNUTRITION PRESENT: [ ] Yes [ ] No  [ ] PPSV2 < or = to 30% [ ] significant weight loss  [ ] poor nutritional intake [ ] catabolic state [ ] anasarca     Albumin, Serum: 3.5 g/dL (20 @ 05:45)      REFERRALS:   [ ]Chaplaincy  [ ] Hospice  [ ]Child Life  [ ]Social Work  [ ]Case management [ ]Holistic Therapy   Goals of Care Discussion Document:

## 2020-02-20 NOTE — CONSULT NOTE ADULT - ASSESSMENT
89 yo male w h/o CAD (triple vessel disease diagnosed in 2016, not amenable to intervention), DM2, HTN who is admitted for a pacemaker insertion and GI bleed eval.    Palliative consulted for GOC

## 2020-02-20 NOTE — CONSULT NOTE ADULT - SUBJECTIVE AND OBJECTIVE BOX
Chief Complaint:  Patient is a 89y old  Male who presents with a chief complaint of Pauses (2020 11:25)      HPI:  The patient is a 89 yo male w h/o CAD (triple vessel disease diagnosed in 2016, not amenable to intervention), DM2, HTN who presents to the ED with dyspnea, chest pressure and dizziness. He was then noted to be anemic (Hgb 7.7, last documented 11.7) and while admitted patient had FOBT test which was positive so GI was consulted.  While admitted patient also noted to have worsening bradycardia, with junctional rhythm in the low 40s. A TTE revealed severe LV dysfunction. He is now pending PPM placement.  He is DNR/DNI and does not want any endoscopic interventions.          Allergies:  No Known Allergies      Home Medications:    Hospital Medications:  pantoprazole  Injectable 40 milliGRAM(s) IV Push two times a day      PMHX/PSHX:  Essential hypertension  DM (diabetes mellitus)  CAD (coronary artery disease)  H/O coronary angioplasty  No significant past surgical history      Family history:  Family history of coronary artery disease  Family history of diabetes mellitus      Social History:     ROS:     General:  No wt loss, fevers, chills, night sweats, fatigue,   Eyes:  Good vision, no reported pain  ENT:  No sore throat, pain, runny nose, dysphagia  CV:  No pain, palpitations, hypo/hypertension  Resp:  No dyspnea, cough, tachypnea, wheezing  GI:  See HPI  :  No pain, bleeding, incontinence, nocturia  Muscle:  No pain, weakness  Neuro:  No weakness, tingling, memory problems  Psych:  No fatigue, insomnia, mood problems, depression  Endocrine:  No polyuria, polydipsia, cold/heat intolerance  Heme:  No petechiae, ecchymosis, easy bruisability  Skin:  No rash, edema      PHYSICAL EXAM:     GENERAL:  NAD  CHEST:  Full & symmetric excursion  HEART:  Regular rhythm, no abdominal bruit, no edema  ABDOMEN:  Soft, non-tender, non-distended, normoactive bowel sounds,  no masses , no hepatosplenomegaly  EXTREMITIES:  no cyanosis,clubbing or edema  SKIN:  No rash/erythema/ecchymoses/petechiae/wounds/abscess/warm/dry  NEURO:  Alert, oriented    Vital Signs:  Vital Signs Last 24 Hrs  T(C): 36.8 (2020 12:00), Max: 36.8 (2020 20:00)  T(F): 98.2 (2020 12:00), Max: 98.2 (2020 20:00)  HR: 76 (2020 15:00) (57 - 96)  BP: 104/90 (2020 15:00) (104/90 - 175/77)  BP(mean): 94 (2020 15:00) (72 - 104)  RR: 22 (2020 15:00) (12 - 25)  SpO2: 93% (2020 15:00) (77% - 100%)  Daily     Daily Weight in k.4 (2020 06:00)    LABS:                        9.6    7.54  )-----------( 243      ( 2020 13:23 )             30.9     02-20    137  |  102  |  47<H>  ----------------------------<  166<H>  4.6   |  24  |  1.98<H>    Ca    9.0      2020 05:45  Phos  3.8     02-20  Mg     1.9     02-20    TPro  6.2  /  Alb  3.5  /  TBili  0.4  /  DBili  x   /  AST  28  /  ALT  45<H>  /  AlkPhos  70  02-20    LIVER FUNCTIONS - ( 2020 05:45 )  Alb: 3.5 g/dL / Pro: 6.2 g/dL / ALK PHOS: 70 u/L / ALT: 45 u/L / AST: 28 u/L / GGT: x           PT/INR - ( 2020 10:13 )   PT: 12.8 SEC;   INR: 1.12          PTT - ( 2020 10:13 )  PTT:39.9 SEC        Imaging: Chief Complaint:  Patient is a 89y old  Male who presents with a chief complaint of Pauses (2020 11:25)      HPI:  The patient is a 89 yo male w h/o CAD (triple vessel disease diagnosed in 2016, not amenable to intervention), DM2, HTN who presents to the ED with dyspnea, chest pressure and dizziness. He was then noted to be anemic (Hgb 7.7, last documented 11.7) and while admitted patient had FOBT test which was positive so GI was consulted.  While admitted patient also noted to have worsening bradycardia, with junctional rhythm in the low 40s. A TTE revealed severe LV dysfunction. He is now pending PPM placement.  He is DNR/DNI and does not want any endoscopic interventions.    Denies overt bleeding. No abdominal pain, nausea, vomiting.   No prior EGD or colonoscopy.     Allergies:  No Known Allergies      Home Medications:    Hospital Medications:  pantoprazole  Injectable 40 milliGRAM(s) IV Push two times a day      PMHX/PSHX:  Essential hypertension  DM (diabetes mellitus)  CAD (coronary artery disease)  H/O coronary angioplasty  No significant past surgical history      Family history:  Family history of coronary artery disease  Family history of diabetes mellitus      Social History:     ROS:     General:  No wt loss, fevers, chills, night sweats, fatigue,   Eyes:  Good vision, no reported pain  ENT:  No sore throat, pain, runny nose, dysphagia  CV:  No pain, palpitations, hypo/hypertension  Resp:  No dyspnea, cough, tachypnea, wheezing  GI:  See HPI  :  No pain, bleeding, incontinence, nocturia  Muscle:  No pain, weakness  Neuro:  No weakness, tingling, memory problems  Psych:  No fatigue, insomnia, mood problems, depression  Endocrine:  No polyuria, polydipsia, cold/heat intolerance  Heme:  No petechiae, ecchymosis, easy bruisability  Skin:  No rash, edema      PHYSICAL EXAM:     GENERAL:  NAD  HEENT: anicteric  CHEST:  Full & symmetric excursion  HEART:  Regular rhythm  ABDOMEN:  Soft, non-tender, non-distended  EXTREMITIES:  no cyanosis, clubbing or edema  SKIN:  No rash/erythema/ecchymoses/petechiae/wounds/abscess/warm/dry  NEURO:  Alert, oriented  PSYCH: appropriate, calm    Vital Signs:  Vital Signs Last 24 Hrs  T(C): 36.8 (2020 12:00), Max: 36.8 (2020 20:00)  T(F): 98.2 (2020 12:00), Max: 98.2 (2020 20:00)  HR: 76 (2020 15:00) (57 - 96)  BP: 104/90 (2020 15:00) (104/90 - 175/77)  BP(mean): 94 (2020 15:00) (72 - 104)  RR: 22 (2020 15:00) (12 - 25)  SpO2: 93% (2020 15:00) (77% - 100%)  Daily     Daily Weight in k.4 (2020 06:00)    LABS:                        9.6    7.54  )-----------( 243      ( 2020 13:23 )             30.9     02-20    137  |  102  |  47<H>  ----------------------------<  166<H>  4.6   |  24  |  1.98<H>    Ca    9.0      2020 05:45  Phos  3.8     02-20  Mg     1.9     02-20    TPro  6.2  /  Alb  3.5  /  TBili  0.4  /  DBili  x   /  AST  28  /  ALT  45<H>  /  AlkPhos  70  02-20    LIVER FUNCTIONS - ( 2020 05:45 )  Alb: 3.5 g/dL / Pro: 6.2 g/dL / ALK PHOS: 70 u/L / ALT: 45 u/L / AST: 28 u/L / GGT: x           PT/INR - ( 2020 10:13 )   PT: 12.8 SEC;   INR: 1.12          PTT - ( 2020 10:13 )  PTT:39.9 SEC        Imaging:

## 2020-02-20 NOTE — CONSULT NOTE ADULT - PROBLEM SELECTOR RECOMMENDATION 5
Thank you for allowing us to participate in your patient's care. We will SIGN OFF. Please page [phone] for any q's or c's.     Travis Darling  Palliative Medicine

## 2020-02-20 NOTE — PROGRESS NOTE ADULT - ASSESSMENT
Patient is a 89yo male with past medical history of CAD (triple vessel disease diagnosed in 2016, not amenable to intervention), DM2, HTN who presents to the ED with symptomatic atrial fibrillation with slow ventricular response, junctional escape  and pauses up to 5.45 seconds [id number] VASc 4. . Refused TVP.  Echo with severe LV dysfunction.  Patient DNR,   He was noted to have ST depressions and elevated cardiac enzymes. He was initially loaded with ASA, Brilinta and started on heparin gtt for ACS. However, found to be anemic, anticoagulation discontinued and now s/p transfusion.   Patient initially refusing PPM but after a long discussion with the patient and his family concerning all aspects of PPM therapy including  the risks, benefits and alternatives and what it means to live with a pacemaker and answering all questions, he agreed to the device.   Plan:  Hold all AV padma blockers          NPO for dual chamber PPM today.            Patient has consented.

## 2020-02-20 NOTE — GOALS OF CARE CONVERSATION - ADVANCED CARE PLANNING - CONVERSATION DETAILS
Goals of Care discussed at length with the patient, son Michael at bedside, and son Emre on the phone. This conversation included current condition, prognosis, and options for therapy. The patient understands the risks and benefits of the pacemaker planned for today. Patient also understands that GI will be coming to evaluate him for a GI bleed. Hospice information was discussed by the palliative care attending Dr. Darling, however the patient stated that he does not wish for more information about hospice at this time. Dr. Darling reviewed the patient's understanding and desire to be DNR/DNI.

## 2020-02-20 NOTE — PROGRESS NOTE ADULT - ASSESSMENT
89 yo male w h/o CAD (triple vessel disease diagnosed in 2016, not amenable to intervention), DM2, HTN, admitted to CCU for NSTEMI, elevated cardiac enzymes, bradycardia to 30s with junctional rhythm, and anemia. Pending pacemaker and palliative care meeting, as pt is not a candidate for PCI or CABG.     #Cardiac    NSTEMI/ ACS   -Holding heparin in setting of GI bleed, anemia  -hold ASA  -not amenable to PCI or CABG    Bradycardia  -holding beta blocker  -improved HR in CCU  -pt declined TVP  -pending PPM placement per EP 2/20  -TFTs wnl     #Heme  Anemia  -s/p 1 u Type O neg in ED, 1 unit AM of 2/20  -transfuse to keep Hgb>8  -active T&S  -FOBT positive see below     #GI  GI bleed  -started protonix 40 mg IV BID  -GI consult after PPM   -currently HD stable    #Endo  DM2  -A1C, ISS, FSBG checks    #Dispo  -DNR/DNI  -GOC meeting at 1 pm 2/20, palliative consulted

## 2020-02-20 NOTE — CONSULT NOTE ADULT - PROBLEM SELECTOR RECOMMENDATION 4
.  # Code: DNR, DNI  # HCP: Michael and Emre (sons) share decision-making    > Sesar are aware of hospice benefit. I answered all their q's and c's to their apparent satisfaction.  > They will reach out after workup done if they wish to pursue hospice    Total face to face time discussing goals of care, advance care planning, code status and hospice = 30 mins

## 2020-02-20 NOTE — PROGRESS NOTE ADULT - SUBJECTIVE AND OBJECTIVE BOX
Patient s/p increased shortness of breath and chest tightness overnight. Currently receiving his second unit of PRBC's and is feeling better.   Currently in sinus rhythm in the 60-70's with APC's.     Vital Signs Last 24 Hrs  T(C): 36.5 (20 Feb 2020 07:30), Max: 36.8 (19 Feb 2020 20:00)  T(F): 97.7 (20 Feb 2020 07:30), Max: 98.2 (19 Feb 2020 20:00)  HR: 63 (20 Feb 2020 10:00) (48 - 100)  BP: 133/71 (20 Feb 2020 10:00) (102/60 - 145/89)  BP(mean): 87 (20 Feb 2020 10:00) (72 - 104)  RR: 12 (20 Feb 2020 10:00) (12 - 25)  SpO2: 95% (20 Feb 2020 10:00) (72% - 100%)      EKG: pending  Telemetry:  Normal sinus rhythm 60-70's with APC's and occasional PVC's.   MEDICATIONS  (STANDING):  furosemide   Injectable 40 milliGRAM(s) IV Push once  pantoprazole  Injectable 40 milliGRAM(s) IV Push two times a day    MEDICATIONS  (PRN):          Physical exam:   Gen- awake alert in NAD  Resp- clear to auscultation.  No wheezing, rales or rhonchi  CV- S1 and S2 RRR. Grade 1/6 systolic murmur.  No gallops or rubs.   ABD- soft nontender + bowel sounds  +Left inguinal hernia reducible   EXT- no edema no calf tenderness.   Neuro: grossly nonfocal                            7.8    6.79  )-----------( 249      ( 20 Feb 2020 05:45 )             26.1     PT/INR - ( 19 Feb 2020 10:13 )   PT: 12.8 SEC;   INR: 1.12          PTT - ( 19 Feb 2020 10:13 )  PTT:39.9 SEC  02-20    137  |  102  |  47<H>  ----------------------------<  166<H>  4.6   |  24  |  1.98<H>    Ca    9.0      20 Feb 2020 05:45  Phos  3.8     02-20  Mg     1.9     02-20    TPro  6.2  /  Alb  3.5  /  TBili  0.4  /  DBili  x   /  AST  28  /  ALT  45<H>  /  AlkPhos  70  02-20    CARDIAC MARKERS ( 19 Feb 2020 14:45 )  x     / x     / 84 u/L / 3.90 ng/mL / x

## 2020-02-20 NOTE — PROGRESS NOTE ADULT - SUBJECTIVE AND OBJECTIVE BOX
Aftab Prakash, PGY-1  CCU Service  Internal Medicine  Pager: [phone] ([facility]) | [phone] ([facility])    PATIENT:  MIKEY ANDRE  [id number]    CHIEF COMPLAINT:  Patient is a 89y old  Male who presents with a chief complaint of Pauses (2020 18:50)      INTERVAL HISTORY/OVERNIGHT EVENTS: Hgb 7.8 overnight, given 1 unit today, on 3 L NC, pending PPM today       REVIEW OF SYSTEMS:    Constitutional:     [ ] negative [ ] fevers [ ] chills [ ] weight loss [ ] weight gain  HEENT:                  [ ] negative [ ] dry eyes [ ] eye irritation [ ] postnasal drip [ ] nasal congestion  CV:                         [ ] negative  [ ] chest pain [ ] orthopnea [ ] palpitations [ ] murmur  Resp:                     [ ] negative [ ] cough [ ] shortness of breath [ ] dyspnea [ ] wheezing [ ] sputum [ ] hemoptysis  GI:                          [ ] negative [ ] nausea [ ] vomiting [ ] diarrhea [ ] constipation [ ] abd pain [ ] dysphagia   :                        [ ] negative [ ] dysuria [ ] nocturia [ ] hematuria [ ] increased urinary frequency  Musculoskeletal: [ ] negative [ ] back pain [ ] myalgias [ ] arthralgias [ ] fracture  Skin:                       [ ] negative [ ] rash [ ] itch  Neurological:        [ ] negative [ ] headache [ ] dizziness [ ] syncope [ ] weakness [ ] numbness  Psychiatric:           [ ] negative [ ] anxiety [ ] depression  Endocrine:            [ ] negative [ ] diabetes [ ] thyroid problem  Heme/Lymph:      [ ] negative [ ] anemia [ ] bleeding problem  Allergic/Immune: [ ] negative [ ] itchy eyes [ ] nasal discharge [ ] hives [ ] angioedema    [ ] All other systems negative  [ ] Unable to assess ROS because ________.    MEDICATIONS:  MEDICATIONS  (STANDING):  furosemide   Injectable 40 milliGRAM(s) IV Push once  pantoprazole  Injectable 40 milliGRAM(s) IV Push two times a day    MEDICATIONS  (PRN):      ALLERGIES:  Allergies    No Known Allergies    Intolerances        OBJECTIVE:  ICU Vital Signs Last 24 Hrs  T(C): 36.5 (2020 07:30), Max: 36.8 (2020 20:00)  T(F): 97.7 (2020 07:30), Max: 98.2 (2020 20:00)  HR: 85 (2020 09:00) (30 - 100)  BP: 145/89 (2020 09:00) (102/60 - 145/89)  BP(mean): 104 (2020 09:00) (72 - 104)  ABP: --  ABP(mean): --  RR: 24 (2020 09:00) (13 - 25)  SpO2: 97% (2020 09:00) (72% - 100%)      Adult Advanced Hemodynamics Last 24 Hrs  CVP(mm Hg): --  CVP(cm H2O): --  CO: --  CI: --  PA: --  PA(mean): --  PCWP: --  SVR: --  SVRI: --  PVR: --  PVRI: --  CAPILLARY BLOOD GLUCOSE        CAPILLARY BLOOD GLUCOSE        I&O's Summary    2020 07:01  -  2020 07:00  --------------------------------------------------------  IN: 100 mL / OUT: 550 mL / NET: -450 mL      Daily Height in cm: 162.56 (2020 12:28)    Daily Weight in k.4 (2020 06:00)    PHYSICAL EXAMINATION:  General: WN/WD NAD  HEENT: moist mucous membranes  Neurology: A&Ox3, nonfocal, RIDLEY x 4  Respiratory: b/l faint crackles at bases  CV: RRR, S1S2, no murmurs, rubs or gallops  Abdominal: Soft, NT, ND   Extremities: No edema, + peripheral pulses  Incisions:   Tubes:    LABS:                          7.8    6.79  )-----------( 249      ( 2020 05:45 )             26.1     02-20    137  |  102  |  47<H>  ----------------------------<  166<H>  4.6   |  24  |  1.98<H>    Ca    9.0      2020 05:45  Phos  3.8     02-20  Mg     1.9     02-20    TPro  6.2  /  Alb  3.5  /  TBili  0.4  /  DBili  x   /  AST  28  /  ALT  45<H>  /  AlkPhos  70  02-20    LIVER FUNCTIONS - ( 2020 05:45 )  Alb: 3.5 g/dL / Pro: 6.2 g/dL / ALK PHOS: 70 u/L / ALT: 45 u/L / AST: 28 u/L / GGT: x           PT/INR - ( 2020 10:13 )   PT: 12.8 SEC;   INR: 1.12          PTT - ( 2020 10:13 )  PTT:39.9 SEC  Creatine Kinase, Serum: 84 u/L ( @ 14:45)  CKMB: 3.90 ng/mL ( @ 14:45)    CARDIAC MARKERS ( 2020 14:45 )  x     / x     / 84 u/L / 3.90 ng/mL / x              TELEMETRY:     EKG:     IMAGING:

## 2020-02-21 NOTE — PROGRESS NOTE ADULT - ASSESSMENT
Patient is a 89yo male with past medical history of CAD (triple vessel disease diagnosed in 2016, not amenable to intervention), DM2, HTN who presented to the ED with symptomatic atrial fibrillation with slow ventricular response, junctional escape  and pauses up to 5.45 seconds [id number] VASc 4.  Refused TVP.  Echo with severe LV dysfunction.  Patient DNR, Now s/p dual chamber [employer] PPM. Awaiting post implant interrogation by [employer] rep.   He was noted to have ST depressions and elevated cardiac enzymes. He was initially loaded with ASA, Brilinta and started on heparin gtt for ACS. However, found to be anemic, anticoagulation discontinued and now s/p transfusion x2.  Today H/H stable. GI to evaluate.   Pro BNP elevated to > 35569.  Patient with mild dyspnea on O2 with bibasilar crackles. Received IV Lasix last night and again this morning.   Post anesthesia agitation treated with Zyprexa, melatonin and Haldol x2 overnight. Less agitated now. Mental status closer to baseline.      Plan:  Start low dose Metoprolol 12.5 bid           Continue diuresis           Will teach family and patient later today when he is more alert.            Follow-up device clinic appointment on 3/6/2020 at 2:15pm  4th floor Oncology building [phone]. Patient is a 89yo male with past medical history of CAD (triple vessel disease diagnosed in 2016, not amenable to intervention), DM2, HTN who presented to the ED with symptomatic atrial fibrillation with slow ventricular response, junctional escape in the 30's and pauses up to 5.45 seconds [id number] VASc 4.  Refused TVP.  Echo with severe LV dysfunction.  Patient DNR, Now s/p dual chamber [employer] PPM. Awaiting post implant interrogation by [employer] rep.   He was noted to have ST depressions and elevated cardiac enzymes. He was initially loaded with ASA, Brilinta and started on heparin gtt for ACS. However, found to be anemic, anticoagulation discontinued and now s/p transfusion x2.  Today H/H stable. GI to evaluate.   Pro BNP elevated to > 31717.  Patient with mild dyspnea on O2 with bibasilar crackles. Received IV Lasix last night and again this morning.   Post anesthesia agitation treated with Zyprexa, melatonin and Haldol x2 overnight. Less agitated now. Mental status closer to baseline.      Plan:  Start low dose Metoprolol 12.5 bid           Continue diuresis           Will teach family and patient later today when he is more alert.            Follow-up device clinic appointment on 3/6/2020 at 2:15pm  4th floor Oncology building [phone]. Patient is a 89yo male with past medical history of CAD (triple vessel disease diagnosed in 2016, not amenable to intervention), DM2, HTN who presented to the ED with symptomatic atrial fibrillation with slow ventricular response, junctional escape in the 30's and pauses up to 5.45 seconds [id number] VASc 4.   Echo with severe LV dysfunction.  Patient DNR.  Now s/p dual chamber [employer] PPM.  Post implant interrogation done.  Device sensing and pacing well. Currently not on anticoagulation due to anemia of unknown origin. Today h/h stable.   Pro BNP elevated to > 12018.  Patient with mild dyspnea on O2 with bibasilar crackles. Received IV Lasix last night and again this morning.   Post anesthesia agitation treated with Zyprexa, melatonin and Haldol x2 overnight. Less agitated now. Mental status closer to baseline.      Plan:  Start low dose Metoprolol 12.5 bid           Continue diuresis           Called his son Michael.  PPM and home monitor instructions reviewed and written instructions left at patient's bedside.            Dressing to be removed upon discharge.            Follow-up device clinic appointment on 3/6/2020 at 2:15pm  4th floor Oncology building [phone].

## 2020-02-21 NOTE — PROGRESS NOTE ADULT - SUBJECTIVE AND OBJECTIVE BOX
Events from last night noted. Patient s/p dual chamber PPM yesterday.  No complications. However, he was agitated through the night and received Zyprexa, melatonin and Haldol x2.  This morning he is OOB to chair, very sleepy. .  He denies chest pain or pain at the PPM site. His breathing is more labored than yesterday.  Using 2L supplemental O2.       Vital Signs Last 24 Hrs  T(C): 36.3 (21 Feb 2020 04:00), Max: 36.9 (20 Feb 2020 16:00)  T(F): 97.3 (21 Feb 2020 04:00), Max: 98.4 (20 Feb 2020 16:00)  HR: 76 (21 Feb 2020 09:00) (60 - 94)  BP: 133/75 (21 Feb 2020 09:00) (103/51 - 175/77)  BP(mean): 88 (21 Feb 2020 09:00) (66 - 109)  RR: 18 (21 Feb 2020 09:00) (12 - 35)  SpO2: 98% (21 Feb 2020 09:00) (93% - 100%)      EKG: NSR w/RBBB QRS 146ms  Telemetry:  Normal sinus rhythm with RBBB, intermittent atrial pacing throughout the night. Very infrequent ventricular pacing.     MEDICATIONS  (STANDING):  ceFAZolin   IVPB 2000 milliGRAM(s) IV Intermittent every 8 hours  furosemide   Injectable 40 milliGRAM(s) IV Push once  melatonin 6 milliGRAM(s) Oral at bedtime  metoprolol tartrate 12.5 milliGRAM(s) Oral two times a day  pantoprazole  Injectable 40 milliGRAM(s) IV Push two times a day    MEDICATIONS  (PRN):          Physical exam:   Gen- OOB to chair,  not confused but sleepy from being up all night.   Resp- poor effort. decreased breath sounds with bibasilar crackles  CV-  S1 and S2 RRR. No murmurs, gallops or rubs. Left sided  PPM site with intact steri strips.  No bleeding, hematoma or ecchymosis. No pain.   ABD- soft nontender + bowel sounds   EXT- no edema or calf tenderness  Neuro- grossly nonfocal.                             9.6    8.25  )-----------( 218      ( 21 Feb 2020 05:30 )             32.7     PT/INR - ( 19 Feb 2020 10:13 )   PT: 12.8 SEC;   INR: 1.12          PTT - ( 19 Feb 2020 10:13 )  PTT:39.9 SEC  02-21    140  |  100  |  42<H>  ----------------------------<  146<H>  4.2   |  21<L>  |  1.89<H>    Ca    8.7      21 Feb 2020 05:30  Phos  4.8     02-21  Mg     1.7     02-21    TPro  6.2  /  Alb  3.5  /  TBili  0.4  /  DBili  x   /  AST  28  /  ALT  45<H>  /  AlkPhos  70  02-20    CARDIAC MARKERS ( 19 Feb 2020 14:45 )  x     / x     / 84 u/L / 3.90 ng/mL / x

## 2020-02-21 NOTE — SWALLOW BEDSIDE ASSESSMENT ADULT - SWALLOW EVAL: DIAGNOSIS
Patient presents with Functional OroPharyngeal Stage swallowing mechanism characterized by adequate oral containment, adequate mashing/chewing for soft solids (banana) with adequate bolus manipulation and transfer with adequate oral clearance. There is laryngeal elevation upon palpation, initiation of the pharyngeal swallow.  There were no overt signs of impaired airway protection.

## 2020-02-21 NOTE — PHYSICAL THERAPY INITIAL EVALUATION ADULT - DISCHARGE DISPOSITION, PT EVAL
to be determined after ambulation assessment , anticipating Rehab facility at this time, please follow further PT notes.

## 2020-02-21 NOTE — PHYSICAL THERAPY INITIAL EVALUATION ADULT - ADDITIONAL COMMENTS
Pt is unable to provide information at this time about PLOF due to impaired cognition at this time s/p Sx as per RN. Information obtained from previous PT Meet in 2016. As per Meet patient lives in a garden apartment , 1 flight of steps to manage.

## 2020-02-21 NOTE — CHART NOTE - NSCHARTNOTEFT_GEN_A_CORE
CCU Transfer Note    Transfer from: CCU    Transfer to: (  ) Medicine    ( x ) Telemetry    (  ) RCU                               (  ) Palliative    (  ) Stroke Unit    (  ) MICU    (  ) __________________    Accepting physician:    Sign out given to:     HPI / CCU COURSE:    89 yo male w h/o CAD (triple vessel disease diagnosed in 2016, not amenable to intervention), DM2, HTN who presents to the ED with dyspnea, chest pressure and dizziness. In the ED pt was noted to have ST depressions and elevated cardiac enzymes. He was initially loaded with ASA, Brilinta and started on heparin gtt for ACS. However he was then noted to be anemic (Hgb 7.7, last documented 11.7). Therefore heparin was stopped and pRBC transfusion was ordered. The pt subsequently developed worsening bradycardia, with junctional rhythm in the low 40s. A TTE revealed severe LV dysfunction. The pt's prior angiogram was reviewed with Dr. Rivera and Dr. Kendrick. Given his prior angiogram which showed severe CAD not amenable to intervention as well as his anemia, he was deemed not to be a candidate for urgent cath. Dopamine gtt was started for the patient's bradycardia and a TVP was recommended. However, pt stated he does not want any invasive interventions, including TVP. Pt also stated he would not want CPR performed or to be intubated. Patient was completely alert and oriented at the time he made this decision. Admitted to CCU for further monitoring. Dopamine gtt weaned off in CCU at 3:20 pm on 2/19. Required 2nd unit of pRBC. GI was consulted and recommended endoscopy, which the patient declined. Hgb remained stable. EP was called and patient underwent dual chamber PPM without complication. The patient had some delirium following the procedure but slowly improved. Palliative care was consulted given the patient's triple vessel CAD and GI bleed - the family and patient deferred hospice planning.     ASSESSMENT & PLAN:       Mr Monterroso is a 89 yo male w h/o CAD (triple vessel disease diagnosed in 2016, not amenable to intervention), DM2, HTN, admitted to CCU for NSTEMI, elevated cardiac enzymes, bradycardia to 30s with junctional rhythm, and anemia due to gi bleeding. S/p pacemaker placement.     FOR FOLLOW UP:  [ ] c/w diuresis - pt has b/l basal crackles, has received Lasix yesterday and today. Pt remains asymptomatic, but desats when off NC  [ ] delirium precautions   [ ] uptitrate metoprolol as titrate tolerated   [ ] f/u PT recs     Aftab Prakash MD  Internal Medicine, PGY-1  Pager: [phone] ([facility]) | [phone] ([facility]) CCU Transfer Note    Transfer from: CCU    Transfer to: (  ) Medicine    ( x ) Telemetry    (  ) RCU                               (  ) Palliative    (  ) Stroke Unit    (  ) MICU    (  ) __________________    Accepting physician: Aleksandr Shipman    Sign out given to: JUAN PABLO Belle     HPI / CCU COURSE:    89 yo male w h/o CAD (triple vessel disease diagnosed in 2016, not amenable to intervention), DM2, HTN who presents to the ED with dyspnea, chest pressure and dizziness. In the ED pt was noted to have ST depressions and elevated cardiac enzymes. He was initially loaded with ASA, Brilinta and started on heparin gtt for ACS. However he was then noted to be anemic (Hgb 7.7, last documented 11.7). Therefore heparin was stopped and pRBC transfusion was ordered. The pt subsequently developed worsening bradycardia, with junctional rhythm in the low 40s. A TTE revealed severe LV dysfunction. The pt's prior angiogram was reviewed with Dr. Rivera and Dr. Kendrick. Given his prior angiogram which showed severe CAD not amenable to intervention as well as his anemia, he was deemed not to be a candidate for urgent cath. Dopamine gtt was started for the patient's bradycardia and a TVP was recommended. However, pt stated he does not want any invasive interventions, including TVP. Pt also stated he would not want CPR performed or to be intubated. Patient was completely alert and oriented at the time he made this decision. Admitted to CCU for further monitoring. Dopamine gtt weaned off in CCU at 3:20 pm on 2/19. Required 2nd unit of pRBC. GI was consulted and recommended endoscopy, which the patient declined. Hgb remained stable. EP was called and patient underwent dual chamber PPM without complication. The patient had some delirium following the procedure but slowly improved. Palliative care was consulted given the patient's triple vessel CAD and GI bleed - the family and patient deferred hospice planning.     ASSESSMENT & PLAN:       Mr Monterroso is a 89 yo male w h/o CAD (triple vessel disease diagnosed in 2016, not amenable to intervention), DM2, HTN, admitted to CCU for NSTEMI, elevated cardiac enzymes, bradycardia to 30s with junctional rhythm, and anemia due to gi bleeding. S/p pacemaker placement.     FOR FOLLOW UP:  [ ] c/w diuresis - pt has b/l basal crackles, has received Lasix yesterday and today. Pt remains asymptomatic, but desats when off NC  [ ] delirium precautions   [ ] uptitrate metoprolol as titrate tolerated   [ ] f/u PT recs     Aftab Prakash MD  Internal Medicine, PGY-1  Pager: [phone] ([facility]) | [phone] ([facility])

## 2020-02-21 NOTE — SWALLOW BEDSIDE ASSESSMENT ADULT - COMMENTS
89 yo male w h/o CAD (triple vessel disease diagnosed in 2016, not amenable to intervention), DM2, HTN, admitted to CCU for NSTEMI, elevated cardiac enzymes, bradycardia to 30s with junctional rhythm, and anemia. Pending pacemaker and palliative care meeting, as pt is not a candidate for PCI or CABG.     As per discussion with CCU MD Team Member. Patient is NPO today for Pacemaker placement. This service will reattempt as schedule permits.
89 yo male w h/o CAD (triple vessel disease diagnosed in 2016, not amenable to intervention), DM2, HTN, admitted to CCU for NSTEMI, elevated cardiac enzymes, bradycardia to 30s with junctional rhythm, and anemia. Pending pacemaker and palliative care meeting, as pt is not a candidate for PCI or CABG.    Patient seen at bedside, alert and oriented x3. Patient is able to follow simple commands and express simple needs. Patient is 1:1 at this time; pleasant calm state but was trying to get out of bed; redirected to stay in bed. Patient was offered to place his dentures into his oral cavity; however patient declined at this time.

## 2020-02-21 NOTE — PROGRESS NOTE ADULT - ATTENDING COMMENTS
Patient sp PPM yesterday  Agitated overnight  Anemia from likely GIB  Monitor H/H  Palliative input appreciated

## 2020-02-21 NOTE — PHYSICAL THERAPY INITIAL EVALUATION ADULT - GENERAL OBSERVATIONS, REHAB EVAL
Patient received seated out of bed in reclining chair ,(+) tele monitor  (+) restless ,attempting to get of chair, PCA is at bedside.

## 2020-02-21 NOTE — SWALLOW BEDSIDE ASSESSMENT ADULT - ORAL PREPARATORY PHASE
Within functional limits slow mashing/chewing due to mostly edentulous state; did not want to wear his dentures

## 2020-02-21 NOTE — PHYSICAL THERAPY INITIAL EVALUATION ADULT - PERTINENT HX OF CURRENT PROBLEM, REHAB EVAL
This is a 89 yo male  admitted to CCU for NSTEMI, elevated cardiac enzymes, bradycardia to 30s with junctional rhythm, and anemia

## 2020-02-21 NOTE — PROGRESS NOTE ADULT - ASSESSMENT
89 yo male w h/o CAD (triple vessel disease diagnosed in 2016, not amenable to intervention), DM2, HTN, admitted to CCU for NSTEMI, elevated cardiac enzymes, bradycardia to 30s with junctional rhythm, and anemia. Pending pacemaker and palliative care meeting, as pt is not a candidate for PCI or CABG.     #Cardiac    NSTEMI/ ACS   -Holding heparin in setting of GI bleed, anemia  -hold ASA  -not amenable to PCI or CABG    Bradycardia  -holding beta blocker  -improved HR in CCU  -s/p PPM 2/20  -TFTs wnl    #Heme  Anemia  -s/p 1 u Type O neg in ED, 1 unit AM of 2/20  -transfuse to keep Hgb>8  -active T&S  -FOBT positive see below     #GI  GI bleed  -started protonix 40 mg IV BID  -GI consult after PPM   -currently HD stable  -pt refused endoscopy    #Endo  DM2  -A1C 6.6  - ISS, FSBG checks    #Dispo  -DNR/DNI  -GOC meeting at 1 pm 2/20, palliative consulted   --hospice discussed, family will decided, can re-consult palliative as needed

## 2020-02-21 NOTE — SWALLOW BEDSIDE ASSESSMENT ADULT - ADDITIONAL RECOMMENDATIONS
Monitor PO tolerance/intake. Monitor for any evolving mental status changes that may impact on oral intake.

## 2020-02-21 NOTE — PROGRESS NOTE ADULT - SUBJECTIVE AND OBJECTIVE BOX
Aftab Prakash, PGY-1  CCU Service  Internal Medicine  Pager: [phone] ([facility]) | [phone] ([facility])    PATIENT:  MIKEY ANDRE  [id number]    CHIEF COMPLAINT:  Patient is a 89y old  Male who presents with a chief complaint of Pauses (20 Feb 2020 16:56)      INTERVAL HISTORY/OVERNIGHT EVENTS: s/p PPM yesterday. Agitated overnight, received melatonin, Zyprexa, haldol x2.       REVIEW OF SYSTEMS:    Constitutional:     [ ] negative [ ] fevers [ ] chills [ ] weight loss [ ] weight gain  HEENT:                  [ ] negative [ ] dry eyes [ ] eye irritation [ ] postnasal drip [ ] nasal congestion  CV:                         [ ] negative  [ ] chest pain [ ] orthopnea [ ] palpitations [ ] murmur  Resp:                     [ ] negative [ ] cough [ ] shortness of breath [ ] dyspnea [ ] wheezing [ ] sputum [ ] hemoptysis  GI:                          [ ] negative [ ] nausea [ ] vomiting [ ] diarrhea [ ] constipation [ ] abd pain [ ] dysphagia   :                        [ ] negative [ ] dysuria [ ] nocturia [ ] hematuria [ ] increased urinary frequency  Musculoskeletal: [ ] negative [ ] back pain [ ] myalgias [ ] arthralgias [ ] fracture  Skin:                       [ ] negative [ ] rash [ ] itch  Neurological:        [ ] negative [ ] headache [ ] dizziness [ ] syncope [ ] weakness [ ] numbness  Psychiatric:           [ ] negative [ ] anxiety [ ] depression  Endocrine:            [ ] negative [ ] diabetes [ ] thyroid problem  Heme/Lymph:      [ ] negative [ ] anemia [ ] bleeding problem  Allergic/Immune: [ ] negative [ ] itchy eyes [ ] nasal discharge [ ] hives [ ] angioedema    [ ] All other systems negative  [ ] Unable to assess ROS because ________.    MEDICATIONS:  MEDICATIONS  (STANDING):  ceFAZolin   IVPB 2000 milliGRAM(s) IV Intermittent every 8 hours  melatonin 6 milliGRAM(s) Oral at bedtime  pantoprazole  Injectable 40 milliGRAM(s) IV Push two times a day    MEDICATIONS  (PRN):      ALLERGIES:  Allergies    No Known Allergies    Intolerances        OBJECTIVE:  ICU Vital Signs Last 24 Hrs  T(C): 36.3 (21 Feb 2020 04:00), Max: 36.9 (20 Feb 2020 16:00)  T(F): 97.3 (21 Feb 2020 04:00), Max: 98.4 (20 Feb 2020 16:00)  HR: 93 (21 Feb 2020 08:00) (60 - 94)  BP: 145/90 (21 Feb 2020 08:00) (103/51 - 175/77)  BP(mean): 103 (21 Feb 2020 08:00) (66 - 109)  ABP: --  ABP(mean): --  RR: 35 (21 Feb 2020 08:00) (12 - 35)  SpO2: 95% (21 Feb 2020 08:00) (93% - 100%)      Adult Advanced Hemodynamics Last 24 Hrs  CVP(mm Hg): --  CVP(cm H2O): --  CO: --  CI: --  PA: --  PA(mean): --  PCWP: --  SVR: --  SVRI: --  PVR: --  PVRI: --  CAPILLARY BLOOD GLUCOSE      POCT Blood Glucose.: 139 mg/dL (20 Feb 2020 18:07)    CAPILLARY BLOOD GLUCOSE      POCT Blood Glucose.: 139 mg/dL (20 Feb 2020 18:07)    I&O's Summary    20 Feb 2020 07:01  -  21 Feb 2020 07:00  --------------------------------------------------------  IN: 360 mL / OUT: 1825 mL / NET: -1465 mL      Daily     Daily     PHYSICAL EXAMINATION:  General: WN/WD NAD  HEENT: moist mucous membranes  Neurology: A&Ox3, nonfocal, RIDLEY x 4  Respiratory: b/l faint crackles at bases  CV: RRR, S1S2, no murmurs, rubs or gallops  Abdominal: Soft, NT, ND   Extremities: No edema, + peripheral pulses  Incisions:   Tubes:    LABS:                          9.6    8.25  )-----------( 218      ( 21 Feb 2020 05:30 )             32.7     02-21    140  |  100  |  42<H>  ----------------------------<  146<H>  4.2   |  21<L>  |  1.89<H>    Ca    8.7      21 Feb 2020 05:30  Phos  4.8     02-21  Mg     1.7     02-21    TPro  6.2  /  Alb  3.5  /  TBili  0.4  /  DBili  x   /  AST  28  /  ALT  45<H>  /  AlkPhos  70  02-20    LIVER FUNCTIONS - ( 20 Feb 2020 05:45 )  Alb: 3.5 g/dL / Pro: 6.2 g/dL / ALK PHOS: 70 u/L / ALT: 45 u/L / AST: 28 u/L / GGT: x           PT/INR - ( 19 Feb 2020 10:13 )   PT: 12.8 SEC;   INR: 1.12          PTT - ( 19 Feb 2020 10:13 )  PTT:39.9 SEC    CARDIAC MARKERS ( 19 Feb 2020 14:45 )  x     / x     / 84 u/L / 3.90 ng/mL / x              TELEMETRY:     EKG:     IMAGING:

## 2020-02-21 NOTE — PROGRESS NOTE ADULT - SUBJECTIVE AND OBJECTIVE BOX
Patient is a 89y old  Male who presents with a chief complaint of Pauses (21 Feb 2020 08:35)      INTERVAL HPI/OVERNIGHT EVENTS: s/p ppm     MEDICATIONS  (STANDING):  ceFAZolin   IVPB 2000 milliGRAM(s) IV Intermittent every 8 hours  furosemide   Injectable 40 milliGRAM(s) IV Push once  melatonin 6 milliGRAM(s) Oral at bedtime  metoprolol tartrate 12.5 milliGRAM(s) Oral two times a day  pantoprazole  Injectable 40 milliGRAM(s) IV Push two times a day    MEDICATIONS  (PRN):          Allergies    No Known Allergies    Intolerances        REVIEW OF SYSTEMS:  Pt oob to chair       PHYSICAL EXAM:  Vital Signs Last 24 Hrs  T(C): 36.3 (21 Feb 2020 04:00), Max: 36.9 (20 Feb 2020 16:00)  T(F): 97.3 (21 Feb 2020 04:00), Max: 98.4 (20 Feb 2020 16:00)  HR: 76 (21 Feb 2020 09:00) (60 - 94)  BP: 133/75 (21 Feb 2020 09:00) (103/51 - 175/77)  BP(mean): 88 (21 Feb 2020 09:00) (66 - 109)  RR: 18 (21 Feb 2020 09:00) (12 - 35)  SpO2: 98% (21 Feb 2020 09:00) (93% - 100%)    GENERAL: NAD, well-groomed, well-developed  HEAD:  Atraumatic, Normocephalic  EYES: EOMI, PERRLA, conjunctiva and sclera clear  NECK: Supple, No JVD, Normal thyroid  NERVOUS SYSTEM:  Alert & Oriented X3, Good concentration;  and symmetric  CHEST/LUNG: Decreased breath sounds at bases, 1/4 up bilatterally.. HEART: S1S2 regular, without murmur, rub nor gallop  ABDOMEN: Soft, Nontender, Nondistended; Bowel sounds present  EXTREMITIES:  2+ Peripheral Pulses, No clubbing, cyanosis, or edema      LABS:                        9.6    8.25  )-----------( 218      ( 21 Feb 2020 05:30 )             32.7     21 Feb 2020 05:30    140    |  100    |  42     ----------------------------<  146    4.2     |  21     |  1.89     Ca    8.7        21 Feb 2020 05:30  Phos  4.8       21 Feb 2020 05:30  Mg     1.7       21 Feb 2020 05:30      Thyroid Stimulating Hormone, Serum (02.20.20 @ 05:45)    Thyroid Stimulating Hormone, Serum: 3.63 uIU/mL      CAPILLARY BLOOD GLUCOSE      POCT Blood Glucose.: 139 mg/dL (20 Feb 2020 18:07)    < from: Transthoracic Echocardiogram (02.19.20 @ 12:19) >  CONCLUSIONS:  1. Tethered mitral valve leaflets with normal opening.  Moderate-severe mitral regurgitation.  2. Calcified trileaflet aortic valve with normal opening.  Mild aortic regurgitation.  3. Severe global left ventricular systolic dysfunction.  4. Right ventricular enlargement with decreased right  ventricular systolic function.  5. Normal tricuspid valve. Moderate tricuspid  regurgitation.  6. Estimated pulmonary artery systolic pressure equals 50  mm Hg, assuming right atrial pressure equals 10  mm Hg,  consistent with moderate pulmonary hypertension    < end of copied text >       #Cardiac    P/P    NSTEMI/ ACS   -Holding heparin in setting of GI bleed, anemia  -c/w ASA, brilinta   -not amenable to PCI or CABG     Restart BB.       Bradycardia  s/p PPM      #GI  GI bleed  -started protonix 40 mg IV BID; change to PO, monitor H/H       #Endo  DM2  -A1C, ISS, FSBG checks    #Dispo  -DNR/DNI      Need PT.        Daily weights

## 2020-02-22 NOTE — PROGRESS NOTE ADULT - SUBJECTIVE AND OBJECTIVE BOX
Patient is a 89y old  Male who presents with a chief complaint of Pauses (21 Feb 2020 10:13)      INTERVAL HPI/OVERNIGHT EVENTS: pt transfered from CCU to telemetry     MEDICATIONS  (STANDING):  dextrose 5%. 1000 milliLiter(s) (50 mL/Hr) IV Continuous <Continuous>  dextrose 50% Injectable 12.5 Gram(s) IV Push once  dextrose 50% Injectable 25 Gram(s) IV Push once  dextrose 50% Injectable 25 Gram(s) IV Push once  insulin lispro (HumaLOG) corrective regimen sliding scale   SubCutaneous three times a day before meals  insulin lispro (HumaLOG) corrective regimen sliding scale   SubCutaneous at bedtime  melatonin 6 milliGRAM(s) Oral at bedtime  metoprolol tartrate 12.5 milliGRAM(s) Oral two times a day  pantoprazole  Injectable 40 milliGRAM(s) IV Push two times a day    MEDICATIONS  (PRN):  dextrose 40% Gel 15 Gram(s) Oral once PRN Blood Glucose LESS THAN 70 milliGRAM(s)/deciLiter  glucagon  Injectable 1 milliGRAM(s) IntraMuscular once PRN Glucose <70 milliGRAM(s)/deciLiter            Allergies    No Known Allergies    Intolerances        REVIEW OF SYSTEMS:  CARDIOVASCULAR: No chest pain, palpitations, dizziness, or leg swelling; no shortness of breath     RESPIRATORY: No cough, wheezing, chills or hemoptysis; No shortness of breath    GASTROINTESTINAL: No abdominal or epigastric pain. No nausea, vomiting, or hematemesis; No diarrhea or constipation. No melena or hematochezia.    NEUROLOGICAL: No headaches, memory loss, loss of strength, numbness      PHYSICAL EXAM:  Vital Signs Last 24 Hrs  T(C): 36.3 (22 Feb 2020 14:00), Max: 36.8 (22 Feb 2020 05:07)  T(F): 97.3 (22 Feb 2020 14:00), Max: 98.2 (22 Feb 2020 05:07)  HR: 72 (22 Feb 2020 17:07) (60 - 72)  BP: 137/67 (22 Feb 2020 17:07) (107/65 - 137/67)  BP(mean): --  RR: 17 (22 Feb 2020 14:00) (17 - 18)  SpO2: 98% (22 Feb 2020 14:00) (97% - 100%)    GENERAL: NAD, well-groomed, well-developed  HEAD:  Atraumatic, Normocephalic  EYES: EOMI, PERRLA, conjunctiva and sclera clear  NECK: Supple, No JVD, Normal thyroid  NERVOUS SYSTEM:  Alert & Oriented X3, Good concentration;  and symmetric  CHEST/LUNG: Decreased at bases   HEART: S1S2 regular, with I-II/VI systolic murmur left base, without rub nor gallop  ABDOMEN: Soft, Nontender, Nondistended; Bowel sounds present  EXTREMITIES:  1+ Peripheral Pulses, No clubbing, cyanosis, or edema      LABS:                        9.7    7.02  )-----------( 191      ( 22 Feb 2020 05:14 )             34.3     22 Feb 2020 05:14    146    |  104    |  35     ----------------------------<  148    4.0     |  27     |  1.92     Ca    8.3        22 Feb 2020 05:14  Phos  3.5       22 Feb 2020 05:14  Mg     1.9       22 Feb 2020 05:14        CAPILLARY BLOOD GLUCOSE      POCT Blood Glucose.: 155 mg/dL (22 Feb 2020 17:02)  POCT Blood Glucose.: 267 mg/dL (22 Feb 2020 12:01)  POCT Blood Glucose.: 137 mg/dL (22 Feb 2020 07:34)  POCT Blood Glucose.: 185 mg/dL (21 Feb 2020 22:01)      :P/P    NSTEMI/ ACS   -Holding heparin in setting of GI bleed, anemia  -c/w ASA, brilinta   -not amenable to PCI or CABG     Restart BB.       Bradycardia  s/p PPM      #GI  GI bleed  -started protonix 40 mg IV BID; change to PO, monitor H/H       #Endo  DM2  -A1C, ISS, FSBG checks    #Dispo  -DNR/DNI      Need PT. (pt ambulated with walker         Daily weights

## 2020-02-23 NOTE — PROGRESS NOTE ADULT - SUBJECTIVE AND OBJECTIVE BOX
Patient is a 89y old  Male who presents with a chief complaint of Pauses (22 Feb 2020 20:49)      INTERVAL HPI/OVERNIGHT EVENTS: none    MEDICATIONS  (STANDING):  dextrose 5%. 1000 milliLiter(s) (50 mL/Hr) IV Continuous <Continuous>  dextrose 50% Injectable 12.5 Gram(s) IV Push once  dextrose 50% Injectable 25 Gram(s) IV Push once  dextrose 50% Injectable 25 Gram(s) IV Push once  insulin lispro (HumaLOG) corrective regimen sliding scale   SubCutaneous three times a day before meals  insulin lispro (HumaLOG) corrective regimen sliding scale   SubCutaneous at bedtime  melatonin 6 milliGRAM(s) Oral at bedtime  metoprolol tartrate 12.5 milliGRAM(s) Oral two times a day  pantoprazole    Tablet 40 milliGRAM(s) Oral before breakfast    MEDICATIONS  (PRN):  dextrose 40% Gel 15 Gram(s) Oral once PRN Blood Glucose LESS THAN 70 milliGRAM(s)/deciLiter  glucagon  Injectable 1 milliGRAM(s) IntraMuscular once PRN Glucose <70 milliGRAM(s)/deciLiter        Orders last 24 hours:  metoprolol tartrate: [Ordered as LOPRESSOR]  12.5 milliGRAM(s), Oral, two times a day  Special Instructions: hold for SBP less than 100, pt with PPM no need for HR parameter per attending  Provider's Contact #: [phone] (02-22-20 @ 11:05)  POCT  Blood Glucose (02-22-20 @ 12:03)  POCT  Blood Glucose (02-22-20 @ 17:02)  pantoprazole    Tablet: [Ordered as PROTONIX   DR]  40 milliGRAM(s), Oral, before breakfast  Administration Instructions: swallow whole * don't crush/chew  Provider's Contact #: [phone] (02-22-20 @ 20:57)  POCT  Blood Glucose (02-22-20 @ 21:29)  POCT  Blood Glucose (02-23-20 @ 07:54)      Allergies    No Known Allergies    Intolerances        REVIEW OF SYSTEMS:  CARDIOVASCULAR: No chest pain, palpitations, dizziness, or leg swelling; no shortness of breath     RESPIRATORY: No cough, wheezing, chills or hemoptysis; No shortness of breath    GASTROINTESTINAL: No abdominal or epigastric pain. No nausea, vomiting, or hematemesis; No diarrhea or constipation. No melena or hematochezia.    NEUROLOGICAL: No headaches, memory loss, loss of strength, numbness      PHYSICAL EXAM:  Vital Signs Last 24 Hrs  T(C): 36.3 (23 Feb 2020 05:25), Max: 37.1 (22 Feb 2020 21:18)  T(F): 97.4 (23 Feb 2020 05:25), Max: 98.7 (22 Feb 2020 21:18)  HR: 82 (23 Feb 2020 05:25) (70 - 82)  BP: 151/95 (23 Feb 2020 05:25) (107/65 - 151/95)  BP(mean): --  RR: 17 (23 Feb 2020 05:25) (17 - 17)  SpO2: 96% (23 Feb 2020 05:25) (96% - 98%)    GENERAL: NAD, well-groomed, well-developed  HEAD:  Atraumatic, Normocephalic  EYES: EOMI, PERRLA, conjunctiva and sclera clear  NECK: Supple, No JVD, Normal thyroid  NERVOUS SYSTEM:  Alert & Oriented X3, Good concentration;  and symmetric  CHEST/LUNG: Clear to auscultation bilaterally; No rales, rhonchi, wheezing, or rubs  HEART: S1S2 regular, with II/VI systolic murmur left base, without , rub nor gallop  ABDOMEN: Soft, Nontender, Nondistended; Bowel sounds present        LABS:                        9.5    7.15  )-----------( 192      ( 23 Feb 2020 05:18 )             31.5     23 Feb 2020 05:18    138    |  100    |  39     ----------------------------<  180    3.6     |  26     |  1.77     Ca    8.4        23 Feb 2020 05:18  Phos  2.4       23 Feb 2020 05:18  Mg     1.9       23 Feb 2020 05:18        CAPILLARY BLOOD GLUCOSE      POCT Blood Glucose.: 190 mg/dL (23 Feb 2020 07:53)  POCT Blood Glucose.: 273 mg/dL (22 Feb 2020 21:28)  POCT Blood Glucose.: 155 mg/dL (22 Feb 2020 17:02)  POCT Blood Glucose.: 267 mg/dL (22 Feb 2020 12:01)         :P/P    NSTEMI/ ACS   -Holding heparin in setting of GI bleed, anemia  -c/w ASA, brilinta   -not amenable to PCI or CABG     Restarted  BB.       Bradycardia  s/p PPM      #GI  GI bleed  H/H stable on PO Protonix.         #Endo  DM2  -A1C, ISS, FSBG checks    #Dispo  -DNR/DNI      Need PT. (pt ambulated with walker   without symptoms).       Daily weights [t down 10 kg since admission  Renal function stable.  Consider ARB/ or entresto.

## 2020-02-24 NOTE — PROGRESS NOTE ADULT - SUBJECTIVE AND OBJECTIVE BOX
Patient is a 89y old  Male who presents with a chief complaint of Pauses (23 Feb 2020 10:59)      INTERVAL HPI/OVERNIGHT EVENTS: no sob.  comfortable on RA     MEDICATIONS  (STANDING):  dextrose 5%. 1000 milliLiter(s) (50 mL/Hr) IV Continuous <Continuous>  dextrose 50% Injectable 12.5 Gram(s) IV Push once  dextrose 50% Injectable 25 Gram(s) IV Push once  dextrose 50% Injectable 25 Gram(s) IV Push once  insulin lispro (HumaLOG) corrective regimen sliding scale   SubCutaneous three times a day before meals  insulin lispro (HumaLOG) corrective regimen sliding scale   SubCutaneous at bedtime  melatonin 6 milliGRAM(s) Oral at bedtime  metoprolol tartrate 12.5 milliGRAM(s) Oral two times a day  pantoprazole    Tablet 40 milliGRAM(s) Oral before breakfast    MEDICATIONS  (PRN):  dextrose 40% Gel 15 Gram(s) Oral once PRN Blood Glucose LESS THAN 70 milliGRAM(s)/deciLiter  glucagon  Injectable 1 milliGRAM(s) IntraMuscular once PRN Glucose <70 milliGRAM(s)/deciLiter        Orders last 24 hours:  POCT  Blood Glucose (02-23-20 @ 11:50)  POCT  Blood Glucose (02-23-20 @ 17:04)  POCT  Blood Glucose (02-23-20 @ 22:00)  POCT  Blood Glucose (02-24-20 @ 07:25)      Allergies    No Known Allergies    Intolerances        REVIEW OF SYSTEMS:  CARDIOVASCULAR: No chest pain, palpitations, dizziness, or leg swelling; no shortness of breath     RESPIRATORY: No cough, wheezing, chills or hemoptysis; No shortness of breath    GASTROINTESTINAL: No abdominal or epigastric pain. No nausea, vomiting, or hematemesis; No diarrhea or constipation. No melena or hematochezia.    NEUROLOGICAL: No headaches, memory loss, loss of strength, numbness      PHYSICAL EXAM:  Vital Signs Last 24 Hrs  T(C): 36.9 (24 Feb 2020 05:00), Max: 37.2 (23 Feb 2020 14:00)  T(F): 98.4 (24 Feb 2020 05:00), Max: 99 (23 Feb 2020 14:00)  HR: 73 (24 Feb 2020 05:00) (73 - 78)  BP: 152/88 (24 Feb 2020 05:00) (133/74 - 152/89)  BP(mean): --  RR: 17 (24 Feb 2020 05:00) (15 - 17)  SpO2: 96% (24 Feb 2020 05:00) (96% - 98%)    GENERAL: NAD, well-groomed, well-developed  HEAD:  Atraumatic, Normocephalic  EYES: EOMI, PERRLA, conjunctiva and sclera clear  NECK: Supple, No JVD, Normal thyroid  NERVOUS SYSTEM:  Alert & Oriented X3, Good concentration;  and symmetric  CHEST/LUNG: Clear to auscultation bilaterally; No rales, rhonchi, wheezing, or rubs  HEART: S1S2 regular, with I-II/VI Systolic  murmur left base, without , rub nor gallop  ABDOMEN: Soft, Nontender, Nondistended; Bowel sounds present  EXTREMITIES:  no edema      LABS:                        9.5    7.10  )-----------( 201      ( 24 Feb 2020 05:30 )             31.5     24 Feb 2020 05:30    138    |  102    |  29     ----------------------------<  172    3.6     |  25     |  1.46     Ca    8.5        24 Feb 2020 05:30  Phos  1.8       24 Feb 2020 05:30  Mg     1.9       24 Feb 2020 05:30        CAPILLARY BLOOD GLUCOSE      POCT Blood Glucose.: 174 mg/dL (24 Feb 2020 07:25)  POCT Blood Glucose.: 187 mg/dL (23 Feb 2020 21:49)  POCT Blood Glucose.: 165 mg/dL (23 Feb 2020 17:03)  POCT Blood Glucose.: 277 mg/dL (23 Feb 2020 11:47)         assessment:  pt stable.  Will advance Metoprolol to 25.  Renal function improved; will start Entresto.   Await PT.  D/C planning

## 2020-02-25 NOTE — PROGRESS NOTE ADULT - SUBJECTIVE AND OBJECTIVE BOX
Patient is a 89y old  Male who presents with a chief complaint of Pauses (24 Feb 2020 08:58)      INTERVAL HPI/OVERNIGHT EVENTS: feels weak.  Using nasal cannula     MEDICATIONS  (STANDING):  dextrose 5%. 1000 milliLiter(s) (50 mL/Hr) IV Continuous <Continuous>  dextrose 50% Injectable 12.5 Gram(s) IV Push once  dextrose 50% Injectable 25 Gram(s) IV Push once  dextrose 50% Injectable 25 Gram(s) IV Push once  insulin lispro (HumaLOG) corrective regimen sliding scale   SubCutaneous three times a day before meals  insulin lispro (HumaLOG) corrective regimen sliding scale   SubCutaneous at bedtime  melatonin 6 milliGRAM(s) Oral at bedtime  metoprolol tartrate 12.5 milliGRAM(s) Oral two times a day  pantoprazole    Tablet 40 milliGRAM(s) Oral before breakfast    MEDICATIONS  (PRN):  dextrose 40% Gel 15 Gram(s) Oral once PRN Blood Glucose LESS THAN 70 milliGRAM(s)/deciLiter  glucagon  Injectable 1 milliGRAM(s) IntraMuscular once PRN Glucose <70 milliGRAM(s)/deciLiter            Allergies    No Known Allergies    Intolerances        REVIEW OF SYSTEMS:  CARDIOVASCULAR: No chest pain, palpitations, dizziness, or leg swelling; no shortness of breath     RESPIRATORY: No cough, wheezing, chills or hemoptysis; No shortness of breath    GASTROINTESTINAL: No abdominal or epigastric pain. No nausea, vomiting, or hematemesis; No diarrhea or constipation. No melena or hematochezia.    NEUROLOGICAL: No headaches, memory loss, loss of strength, numbness      PHYSICAL EXAM:  Vital Signs Last 24 Hrs  T(C): 36.7 (25 Feb 2020 05:15), Max: 36.7 (24 Feb 2020 12:29)  T(F): 98 (25 Feb 2020 05:15), Max: 98.1 (24 Feb 2020 20:00)  HR: 82 (25 Feb 2020 05:15) (62 - 88)  BP: 141/83 (25 Feb 2020 05:15) (103/52 - 153/76)  BP(mean): --  RR: 18 (25 Feb 2020 05:15) (17 - 18)  SpO2: 99% (25 Feb 2020 05:15) (99% - 100%)    Weight 61.7 kg, up 1.7 kg from yesterday     GENERAL: NAD, well-groomed, well-developed  HEAD:  Atraumatic, Normocephalic  EYES: EOMI, PERRLA, conjunctiva and sclera clear  NECK: Supple, No JVD, Normal thyroid  NERVOUS SYSTEM:  Alert & Oriented X3, Good concentration;  and symmetric  CHEST/LUNG: Decreased at bases, No rales,   HEART: S1S2 regular, with I-Ii/VI systolic murmur at apex, without rub nor gallop  ABDOMEN: Soft, Nontender, Nondistended; Bowel sounds present  EXTREMITIES:  2+ Peripheral Pulses, No clubbing, cyanosis, or edema      LABS:                        9.4    6.47  )-----------( 155      ( 25 Feb 2020 06:49 )             31.0     25 Feb 2020 06:49    137    |  102    |  25     ----------------------------<  170    3.7     |  28     |  1.40     Ca    8.1        25 Feb 2020 06:49  Phos  1.9       25 Feb 2020 06:49  Mg     2.0       25 Feb 2020 06:49        CAPILLARY BLOOD GLUCOSE      POCT Blood Glucose.: 176 mg/dL (25 Feb 2020 07:35)  POCT Blood Glucose.: 330 mg/dL (24 Feb 2020 20:42)  POCT Blood Glucose.: 210 mg/dL (24 Feb 2020 16:57)  POCT Blood Glucose.: 346 mg/dL (24 Feb 2020 12:01)          assessment:  CHF.  Will start daily Lasix, as well as entresto.  Monitor renal function.

## 2020-02-26 NOTE — DIETITIAN INITIAL EVALUATION ADULT. - PERTINENT LABORATORY DATA
(2/26) H/H 9.9/32.8 L, Phosphorus 1.9 L, Creat 1.35 H, Glu 193 H;        (2/21) Albumin 3.7, AST 50 H, HbA1c 6.6% H

## 2020-02-26 NOTE — PROGRESS NOTE ADULT - SUBJECTIVE AND OBJECTIVE BOX
Patient is a 89y old  Male who presents with a chief complaint of Pauses (25 Feb 2020 08:32)      INTERVAL HPI/OVERNIGHT EVENTS:    MEDICATIONS  (STANDING):  dextrose 5%. 1000 milliLiter(s) (50 mL/Hr) IV Continuous <Continuous>  dextrose 50% Injectable 12.5 Gram(s) IV Push once  dextrose 50% Injectable 25 Gram(s) IV Push once  dextrose 50% Injectable 25 Gram(s) IV Push once  furosemide    Tablet 40 milliGRAM(s) Oral daily  insulin lispro (HumaLOG) corrective regimen sliding scale   SubCutaneous three times a day before meals  insulin lispro (HumaLOG) corrective regimen sliding scale   SubCutaneous at bedtime  melatonin 6 milliGRAM(s) Oral at bedtime  metoprolol tartrate 12.5 milliGRAM(s) Oral two times a day  pantoprazole    Tablet 40 milliGRAM(s) Oral before breakfast  sacubitril 49 mG/valsartan 51 mG 1 Tablet(s) Oral two times a day    MEDICATIONS  (PRN):  acetaminophen   Tablet .. 650 milliGRAM(s) Oral every 6 hours PRN Temp greater or equal to 38C (100.4F), Mild Pain (1 - 3), Moderate Pain (4 - 6), Severe Pain (7 - 10)  dextrose 40% Gel 15 Gram(s) Oral once PRN Blood Glucose LESS THAN 70 milliGRAM(s)/deciLiter  glucagon  Injectable 1 milliGRAM(s) IntraMuscular once PRN Glucose <70 milliGRAM(s)/deciLiter              Allergies    No Known Allergies    Intolerances        REVIEW OF SYSTEMS:  CARDIOVASCULAR: No chest pain, palpitations, dizziness, or leg swelling; no shortness of breath     RESPIRATORY: No cough, wheezing, chills or hemoptysis; No shortness of breath    GASTROINTESTINAL: No abdominal or epigastric pain. No nausea, vomiting, or hematemesis; No diarrhea or constipation. No melena or hematochezia.    NEUROLOGICAL: No headaches, memory loss, loss of strength, numbness      PHYSICAL EXAM:  Vital Signs Last 24 Hrs  T(C): 36.7 (26 Feb 2020 05:16), Max: 36.7 (26 Feb 2020 05:16)  T(F): 98.1 (26 Feb 2020 05:16), Max: 98.1 (26 Feb 2020 05:16)  HR: 78 (26 Feb 2020 05:16) (65 - 93)  BP: 109/69 (26 Feb 2020 05:16) (107/63 - 152/86)  BP(mean): --  RR: 18 (26 Feb 2020 05:16) (18 - 18)  SpO2: 97% (26 Feb 2020 05:16) (97% - 100%)    GENERAL: NAD, well-groomed, well-developed  HEAD:  Atraumatic, Normocephalic  EYES: EOMI, PERRLA, conjunctiva and sclera clear  NECK: Supple, No JVD, Normal thyroid  NERVOUS SYSTEM:  Alert & Oriented X3, Good concentration;  and symmetric  CHEST/LUNG: Clear to auscultation bilaterally; No rales, rhonchi, wheezing, or rubs  HEART: S1S2 regular, with I-II/VI systolic murmur left base, without , rub nor gallop  ABDOMEN: Soft, Nontender, Nondistended; Bowel sounds present  EXTREMITIES:  1+ Peripheral Pulses, No clubbing, cyanosis, or edema      LABS:                        9.9    7.79  )-----------( 178      ( 26 Feb 2020 05:45 )             32.8     26 Feb 2020 05:45    136    |  99     |  23     ----------------------------<  193    3.8     |  24     |  1.35     Ca    8.4        26 Feb 2020 05:45  Phos  1.9       26 Feb 2020 05:45  Mg     2.0       26 Feb 2020 05:45        CAPILLARY BLOOD GLUCOSE      POCT Blood Glucose.: 202 mg/dL (26 Feb 2020 07:44)  POCT Blood Glucose.: 147 mg/dL (25 Feb 2020 21:13)  POCT Blood Glucose.: 341 mg/dL (25 Feb 2020 17:10)  POCT Blood Glucose.: 247 mg/dL (25 Feb 2020 11:40)         assessment:    1- GIB- stable, now on oral Protonix               2- LEONCIO resolving, likely due to poor perfusion.   Tolerating PO Lasix and entresto   3- CHF- tolerating Entretsto and Lasix.   Weight stable.   Increase physical activity   4- DM- Follow as outpatient  5- Dispo- d/c planning.

## 2020-02-26 NOTE — DISCHARGE NOTE PROVIDER - NSDCFUSCHEDAPPT_GEN_ALL_CORE_FT
MIKEY ANDRE ; 03/06/2020 ; [facility] [id number] MIKEY ANDRE ; 03/06/2020 ; [facility] [id number] MIKEY ANDRE ; 03/06/2020 ; [facility] [id number] [phone]

## 2020-02-26 NOTE — DISCHARGE NOTE PROVIDER - NSDCCPCAREPLAN_GEN_ALL_CORE_FT
PRINCIPAL DISCHARGE DIAGNOSIS  Diagnosis: NSTEMI (non-ST elevated myocardial infarction)  Assessment and Plan of Treatment: NOt amendable to PCI or CABG. Continue Entresto and Lasix. Bradycardia, status post PPM placement. Follow up with Cardiology outpatient within 1 week of discharge        SECONDARY DISCHARGE DIAGNOSES  Diagnosis: GI bleed  Assessment and Plan of Treatment: Status post PRBC. Patient refuses endoscopy. Hematoglobin on discharge 9.9. No signs of active bleeding       Diagnosis: DM (diabetes mellitus)  Assessment and Plan of Treatment: Monitor finger sticks pre-meal and bedtime, low salt, fat and carbohydrate diet, minimize glucose intake.  Exercise daily for at least 30 minutes and weight loss.  Follow up with primary care physician and endocrinologist for routine Hemoglobin A1C checks and management.  Follow up with your ophthalmologist for routine yearly vision exams.      Diagnosis: CAD (coronary artery disease)  Assessment and Plan of Treatment: Continue aspirin and Entresto,, do not stop unless instructed by your physician.  Continue low salt, fat, cholesterol and carbohydrate diet. Follow up with cardiologist and primary care physician's routine appointment.' PRINCIPAL DISCHARGE DIAGNOSIS  Diagnosis: NSTEMI (non-ST elevated myocardial infarction)  Assessment and Plan of Treatment: NOt amendable to PCI or CABG. Continue Entresto and Lasix. Bradycardia, status post PPM placement. Follow up with Cardiology outpatient within 1 week of discharge        SECONDARY DISCHARGE DIAGNOSES  Diagnosis: GI bleed  Assessment and Plan of Treatment: Status post PRBC. Patient refuses endoscopy. Hematoglobin on discharge 9.9. No signs of active bleeding       Diagnosis: DM (diabetes mellitus)  Assessment and Plan of Treatment: Continue home medications. Monitor finger sticks pre-meal and bedtime, low salt, fat and carbohydrate diet, minimize glucose intake.  Exercise daily for at least 30 minutes and weight loss.  Follow up with primary care physician and endocrinologist for routine Hemoglobin A1C checks and management.  Follow up with your ophthalmologist for routine yearly vision exams.      Diagnosis: CAD (coronary artery disease)  Assessment and Plan of Treatment: Continue aspirin and Entresto,, do not stop unless instructed by your physician.  Continue low salt, fat, cholesterol and carbohydrate diet. Follow up with cardiologist and primary care physician's routine appointment.'

## 2020-02-26 NOTE — DIETITIAN INITIAL EVALUATION ADULT. - ADD RECOMMEND
1. Encourage & assist Pt with meals; Monitor PO diet tolerance;         2. Honor food preferences;         3. Monitor labs, hydration status;

## 2020-02-26 NOTE — DISCHARGE NOTE PROVIDER - PROVIDER TOKENS
FREE:[LAST:[Cardiology],PHONE:[(   )    -],FAX:[(   )    -],ADDRESS:[Follow up with Cardiology outpatient, German Cramer within 1 week of discharge]]

## 2020-02-26 NOTE — DIETITIAN INITIAL EVALUATION ADULT. - OTHER INFO
Pt 89 yo male with PMHx of CAD, DM2, HTN with NSTEMI - per chart review. At time of visit Pt appears alert, oriented. Per Pt his appetite "fair"; no report of nausea, vomiting or diarrhea @ this time. Of note Pt passed Swallow Bedside Assessment Adult, SLP rec: Mechanical Soft with Thin Liquids (2/21). PTA Pt was eating Regular consistency food/food items without any chewing or swallowing difficulty. Of note Pt's HbA1c level 6.6% (2/21). At home Pt avoids regular sugar and salt reported. Per Pt his height: ~64" & his weight: ~140#, now. Pt with gradual unintended weight loss "over the course of years" reported. Food preferences discussed; RDN offered PO supplement: Glucerna shake, but Pt declined. No other food related concerns voiced @ present. RDN remains available, Pt made aware.

## 2020-02-26 NOTE — DISCHARGE NOTE PROVIDER - NSDCMRMEDTOKEN_GEN_ALL_CORE_FT
aspirin 81 mg oral delayed release tablet: 1 tab(s) orally once a day  cholecalciferol oral tablet: 5000 unit(s) orally once a day  folic acid 1 mg oral tablet: 1 tab(s) orally once a day  furosemide 40 mg oral tablet: 1 tab(s) orally once a day  isosorbide mononitrate 120 mg oral tablet, extended release: 1 tab(s) orally once a day  pantoprazole 40 mg oral delayed release tablet: 1 tab(s) orally once a day (before a meal)  sacubitril-valsartan 49 mg-51 mg oral tablet: 1 tab(s) orally 2 times a day  simvastatin 40 mg oral tablet: 1 tab(s) orally once a day (at bedtime)  Toprol-XL 25 mg oral tablet, extended release: 1 tab(s) orally once a day

## 2020-02-26 NOTE — DISCHARGE NOTE PROVIDER - CARE PROVIDER_API CALL
Cardiology,   Follow up with Cardiology outpatient, German Cramer within 1 week of discharge  Phone: (   )    -  Fax: (   )    -  Follow Up Time:

## 2020-02-26 NOTE — DISCHARGE NOTE NURSING/CASE MANAGEMENT/SOCIAL WORK - PATIENT PORTAL LINK FT
You can access the FollowMyHealth Patient Portal offered by [facility] by registering at the following website: [email or web address]. By joining [facility] FollowMyHealth portal, you will also be able to view your health information using other applications (apps) compatible with our system.

## 2020-02-26 NOTE — DISCHARGE NOTE PROVIDER - HOSPITAL COURSE
89 YO Male w h/o CAD (triple vessel disease diagnosed in 2016, not amenable to intervention), DM2, HTN, presents to the ED with symptomatic atrial fibrillation with slow ventricular response, junctional escape  and pauses up to 5.45 seconds. admitted to CCU for NSTEMI, elevated cardiac enzymes, bradycardia to 30s  and anemia. Patient is not a candidate for PCI or CABG.         # NSTEMI:    -Not amendable to PCI or CABG    -ASA on hold given GI bleed                                      2- LEONCIO resolving, likely due to poor perfusion.   Tolerating PO Lasix and entresto     3- CHF- tolerating Entretsto and Lasix.   Weight stable.   Increase physical activity     4- DM- Follow as outpatient    +bradycardia s/p PPM 2/20        # GIB:     -Status post PRBC    -Patient refuses endoscopy     -Hematoglobin on discharge 9.9        2/26: This case was discussed with Dr. Shipman. The patient is medically stable and optimized for discharges. All medications were reviewed and prescriptions were sent to a mutually agreed upon pharmacy. 89 YO Male w h/o CAD (triple vessel disease diagnosed in 2016, not amenable to intervention), DM2, HTN, presents to the ED with symptomatic atrial fibrillation with slow ventricular response, junctional escape  and pauses up to 5.45 seconds. admitted to CCU for NSTEMI, elevated cardiac enzymes, bradycardia to 30s  and anemia. Patient is not a candidate for PCI or CABG.         # NSTEMI:    -Not amendable to PCI or CABG    -Continue Entresto and Lasix         # Braducardia:     -status post PPM placement         # DM    -Continue Home medications     -Follow up with Endocrinology outpatient         # GIB:     -Status post PRBC    -Patient refuses endoscopy     -Hematoglobin on discharge 9.9        2/26: This case was discussed with Dr. Shipman. The patient is medically stable and optimized for discharges. All medications were reviewed and prescriptions were sent to a mutually agreed upon pharmacy.

## 2020-03-03 PROBLEM — I10 BENIGN ESSENTIAL HTN: Status: ACTIVE | Noted: 2020-01-01

## 2020-03-03 PROBLEM — Z95.0 PACEMAKER: Status: ACTIVE | Noted: 2020-01-01

## 2020-03-03 PROBLEM — D64.9 ANEMIA: Status: ACTIVE | Noted: 2020-01-01

## 2020-03-03 PROBLEM — G47.00 INSOMNIA: Status: ACTIVE | Noted: 2020-01-01

## 2020-03-03 NOTE — HISTORY OF PRESENT ILLNESS
[FreeTextEntry1] : "Hospital follow up for AMI" Pt is homebound due to limited endurance and weakness. Seen in home for follow up [facility] program.  S/p PPM for bradycardia/SSS. Pt reports that he has increased weakness, "fogginess" since discharge home. Denies headache, fever, dizziness, SOB, chest pain, palpitations. Has LE edema b/l which lasix was increased upon discharge and pt reports is not worsening since discharge.  Denies leg pain/tenderness. Pt also states he has been taking tyleno pm at night but only sleeping 1-2 hours at night.   Meds reviewed. Pt does not have toprol 25mg in home, and also taking propranolol 60 mg QD.   [de-identified] : Hospital Course: 89 YO Male w h/o CAD (triple vessel disease diagnosed in 2016, not amenable to intervention), DM2, HTN, presents to the ED with symptomatic atrial fibrillation with slow ventricular response, junctional escape and pauses up to 5.45 seconds. admitted to CCU for NSTEMI, elevated cardiac enzymes, bradycardia to 30s and anemia. Patient is not a candidate for PCI or CABG. \par # NSTEMI:\par -Not amendable to PCI or CABG\par -Continue Entresto and Lasix \par # Braducardia: \par -status post PPM placement \par # DM\par -Continue Home medications \par -Follow up with Endocrinology outpatient \par # GIB: \par -Status post PRBC\par -Patient refuses endoscopy \par -Hematoglobin on discharge 9.9\par 2/26: This case was discussed with Dr. Shipman. The patient is medically stable and optimized for discharges. All medications were reviewed and prescriptions were sent to a mutually agreed upon pharmacy.\par  \par  [Family Member] : family member

## 2020-03-03 NOTE — PHYSICAL EXAM
[No Acute Distress] : no acute distress [No Respiratory Distress] : no respiratory distress  [No Accessory Muscle Use] : no accessory muscle use [Supple] : supple [Clear to Auscultation] : lungs were clear to auscultation bilaterally [Normal Rate] : normal rate  [Normal S1, S2] : normal S1 and S2 [No Murmur] : no murmur heard [Soft] : abdomen soft [Non Tender] : non-tender [Non-distended] : non-distended [Normal Bowel Sounds] : normal bowel sounds [No Focal Deficits] : no focal deficits [Alert and Oriented x3] : oriented to person, place, and time [de-identified] : left upper chest steristrips c/d/i no swelling, bruising, edema [de-identified] : irregular [de-identified] : +pitting edema b/l

## 2020-03-06 PROBLEM — I50.9 CHF (CONGESTIVE HEART FAILURE): Status: ACTIVE | Noted: 2020-01-01

## 2020-03-06 PROBLEM — I48.91 AFIB: Status: ACTIVE | Noted: 2020-01-01

## 2020-03-06 NOTE — COUNSELING
[Fall prevention counseling provided] : Fall prevention counseling provided [Use recommended devices] : Use recommended devices

## 2020-03-06 NOTE — PHYSICAL EXAM
[No Acute Distress] : no acute distress [Supple] : supple [No Respiratory Distress] : no respiratory distress  [No Accessory Muscle Use] : no accessory muscle use [Clear to Auscultation] : lungs were clear to auscultation bilaterally [Normal Rate] : normal rate  [Normal S1, S2] : normal S1 and S2 [No Murmur] : no murmur heard [Soft] : abdomen soft [Non Tender] : non-tender [Non-distended] : non-distended [Normal Bowel Sounds] : normal bowel sounds [No Focal Deficits] : no focal deficits [Alert and Oriented x3] : oriented to person, place, and time [de-identified] : irregular [de-identified] : +3 pitting edema b/l [de-identified] : left upper chest steristrips c/d/i no swelling, bruising, edema

## 2020-03-06 NOTE — HISTORY OF PRESENT ILLNESS
[Family Member] : family member [FreeTextEntry1] : "Follow up for CHF" Pt is homebound due to limited endurance and weakness. Seen in home for follow up [facility] program.  Reports increased LE swelling since last visit. Denies headache, fever, dizziness, SOB, chest pain, palpitations, leg pain/tenderness. Diet reviewed and pt has high sodium, processed meats for meals. Off propanolol ^ c/o dizziness and hypotension.  [de-identified] : Hospital Course: 89 YO Male w h/o CAD (triple vessel disease diagnosed in 2016, not amenable to intervention), DM2, HTN, presents to the ED with symptomatic atrial fibrillation with slow ventricular response, junctional escape and pauses up to 5.45 seconds. admitted to CCU for NSTEMI, elevated cardiac enzymes, bradycardia to 30s and anemia. Patient is not a candidate for PCI or CABG. \par # NSTEMI:\par -Not amendable to PCI or CABG\par -Continue Entresto and Lasix \par # Braducardia: \par -status post PPM placement \par # DM\par -Continue Home medications \par -Follow up with Endocrinology outpatient \par # GIB: \par -Status post PRBC\par -Patient refuses endoscopy \par -Hematoglobin on discharge 9.9\par 2/26: This case was discussed with Dr. Shipman. The patient is medically stable and optimized for discharges. All medications were reviewed and prescriptions were sent to a mutually agreed upon pharmacy.\par  \par

## 2020-03-09 PROBLEM — R00.1 BRADYCARDIA, UNSPECIFIED: Status: ACTIVE | Noted: 2020-01-01

## 2020-04-09 NOTE — ED PROVIDER NOTE - PHYSICAL EXAMINATION
Physical Exam:    I have reviewed the triage vital signs.    Gen: NAD, AOx3, non-toxic appearing, able to ambulate without assistance  Head and Neck: NCAT, Neck supple without meningismus   HEENT: EOMI, PEERLA, normal conjunctiva, tongue midline, oral mucosa moist  Lung: CTAB, no respiratory distress, no wheezes/rhonchi/rales B/L, speaking in full sentences.   CV: RRR, no murmurs, rubs or gallops.   Abd: soft, NT, ND, no guarding, no rigidity, no rebound tenderness, no CVA tenderness, no masses.   MSK: no gross deformities, ROM normal in UE/LE, no back tenderness  Neuro: CNs II-XII grossly intact. No focal sensory or motor deficits  Skin: Warm, well perfused, no rash, no leg swelling  Psych: Appropriate mood and affect.

## 2020-04-09 NOTE — ED PROVIDER NOTE - PROGRESS NOTE DETAILS
Shanna: Extensive discussion has with patients family on phone. Pros versus cons of admission. Will wait for 12-lead, CMP, and walking 02 sat to come back. If all normal, contact family again to discuss disposition please (Pt's son is in chart). Thus far, patient is well appearing, tolerating PO, alert and oriented with no acute complaints and normal vital signs.

## 2020-04-09 NOTE — ED ADULT NURSE NOTE - ED STAT RN HANDOFF DETAILS
endorsed to surge rn. pt a&o x3, ambulating steadily. tolerating 4l n/c. as per md giordano no covid testing indicated for pt and pt is table with o2 sat of 90-92. pt offers no complaints. hob elevated. safety maintained

## 2020-04-09 NOTE — ED PROVIDER NOTE - ATTENDING CONTRIBUTION TO CARE
Elderly male. FTT. LEONCIO noted. Dehydration. No history of trauma nor signs of trauma on exam. Will defer head CT given otherwise reassuring neuro exam. No obvious signs/symptoms of COVID infection. At risk for connie this in-hospital. However, family is adamant that they do not have the means to care for this patient at home in his current state. Admit.

## 2020-04-09 NOTE — ED PROVIDER NOTE - CLINICAL SUMMARY MEDICAL DECISION MAKING FREE TEXT BOX
Shanna: elderly male sent to hospital by family for delusions, gradual decline in mental status and function, loss of appetite, progressive generalized weakness. Plan: cbc, cmp, cxr, 12 lead, reassess.

## 2020-04-09 NOTE — ED ADULT NURSE NOTE - OBJECTIVE STATEMENT
The patient is a 89y Male complaining of medical evaluation.  Patient brought to room 12 and evaluated by MD. IV lock placed by MD to right antecubital and labs drawn and sent. Patient is alert and oriented times two to three. Answers simple questions appropriately. Pt is comfortable. Eating dinner without difficulty and waiting for results, further orders and disposition.  JIM Smith

## 2020-04-10 NOTE — H&P ADULT - PROBLEM SELECTOR PLAN 4
Insulin sliding scale  Fingersticks before each meal and before bedtime  Consistent carbohydrate diet with bedtime snack

## 2020-04-10 NOTE — H&P ADULT - PROBLEM SELECTOR PLAN 2
No evidence of acute coronary syndrome upon admission  If he has any acute chest pain or shortness of breath, we will check STAT EKG and troponin   Cont imdur, metoprolol, aspirin, and zocor

## 2020-04-10 NOTE — H&P ADULT - NSHPPHYSICALEXAM_GEN_ALL_CORE
Vital Signs Last 24 Hrs  T(C): 36.9 (10 Apr 2020 13:02), Max: 36.9 (10 Apr 2020 13:02)  T(F): 98.4 (10 Apr 2020 13:02), Max: 98.4 (10 Apr 2020 13:02)  HR: 79 (10 Apr 2020 13:02) (55 - 84)  BP: 105/65 (10 Apr 2020 13:02) (100/54 - 117/62)  BP(mean): --  RR: 17 (10 Apr 2020 13:02) (16 - 18)  SpO2: 100% (10 Apr 2020 13:02) (98% - 100%)	    Gen: NAD, AOx3, non-toxic appearing, able to ambulate without assistance  Head and Neck: NCAT, Neck supple without meningismus   HEENT: EOMI, PEERLA, normal conjunctiva, tongue midline, oral mucosa moist  Lung: CTAB, no respiratory distress, no wheezes/rhonchi/rales B/L, speaking in full sentences.   CV: RRR, no murmurs, rubs or gallops.   Abd: soft, NT, ND, no guarding, no rigidity, no rebound tenderness, no CVA tenderness, no masses.   MSK: no gross deformities, ROM normal in UE/LE, no back tenderness  Neuro: CNs II-XII grossly intact. No focal sensory or motor deficits  Skin: Warm, well perfused, no rash, no leg swelling  Psych: Appropriate mood and affect.

## 2020-04-10 NOTE — H&P ADULT - NSHPSOCIALHISTORY_GEN_ALL_CORE
FAMILY HISTORY:  Family history of coronary artery disease  Family history of diabetes mellitus.     Social History:  Social History (marital status, living situation, occupation, tobacco use, alcohol and drug use, and sexual history): Denies tobacco, alcohol or drug use.

## 2020-04-10 NOTE — H&P ADULT - NSHPREVIEWOFSYSTEMS_GEN_ALL_CORE
REVIEW OF SYSTEMS:    CONSTITUTIONAL: (-) dizziness (+) weakness (-) fevers (-) chills (-) weight loss (-) weight gain (-) sweats (-) poor appetite (-) falls (+)failure to thrive  HEENT: (-) visual changes (-) HA (-) vertigo (-) rhinorrhea (-) epistaxis (-) swelling (-) hearing changes (-) sore throat (-) hoarseness  NECK: (-) stiffness (-) pain  RESPIRATORY: (-) cough (-) SOB (-) BILLINGS (-) hemoptysis   CARDIOVASCULAR: (-) chest pain (-) palpitations (-) PND (-) orthopnea  GASTROINTESTINAL: (-) abd pain (+) nausea (-) vomiting (-) diarrhea (-) constipation (-) melena (-) BRBPR (-) dysphagia (-) odynophagia (-) incontinence (-) bloatedness  GENITOURINARY: (-) dysuria  (-) frequency (-) hematuria (-) incontinence (-) abnormal discharge (-) incomplete emptying (-) flank pain  NEUROLOGICAL: (-) confusion (-) slurred speech (-) focal weakness (-) tingling/numbness (-) difficulty walking (-) tremors  MUSCULOSKELETAL: (-) back pain (-) joint pain (-) joint swelling (-) reduced ROM (-) extremity pain (-) extremity swelling  PSYCH: (-) anxious (-) depressed (-) aural hallucinations (-) visual hallucinations (+)delusions  SKIN: (-) itching (-) burning (-) rashes (-) swelling (-) bruising (-) pain  All other review of systems is negative unless indicated above.

## 2020-04-10 NOTE — H&P ADULT - ASSESSMENT
89-year old gentleman with a past medical history of chronic systolic congestive heart failure, triple-vessel coronary artery disease, history of coronary angioplasty last 2016, not amenable to PCI or CABG, DM2, HTN, CKD3, who presented  BIBEMS with the chief complaint of nausea and general malaise for the past two days prior to admission. Admitted with LEONCIO.

## 2020-04-10 NOTE — H&P ADULT - PROBLEM SELECTOR PLAN 3
He is not fluid overloaded  Lasix and ARB on hold 2' LEONCIO  Cont metoprolol  One liter daily fluid restriction  Monitor daily weights  Monitor strict I/Os  No need for repeat transthoracic echocardiogram

## 2020-04-10 NOTE — H&P ADULT - HISTORY OF PRESENT ILLNESS
Portions of H+P were taken from initial ED assessment     This is a 89-year old gentleman with a past medical history of triple-vessel coronary artery disease, history of coronary angioplasty last 2016, not amenable to PCI or CABG, who presented BIBEMS with the chief complaint of nausea and general malaise x 2 days. Pts son called for collaterol: pt has been progressivley declining mentally and functionally at home since last admission in Feb 2020. Patient has been having more difficult time taking care of self, has acted "delusional" on multiple occasions. Son states that patient appears deconditioned as pt never went to rehab after last admission and also visiting nursing services has not been going to patients house. Patient declining even more so in last 1.5 weeks.     	Patient denies any other complaint including chest pain, cough, shortness of breath, fevers.) Portions of H+P were taken from initial ED assessment     This is a 89-year old gentleman with a past medical history of triple-vessel coronary artery disease, history of coronary angioplasty last 2016, not amenable to PCI or CABG, who presented BIBEMS with the chief complaint of nausea and  general malaise for the past two days prior to admission.  Pt's son called for collateral information: Pt had just recently been hospitalized @ [facility] in February 2020 for NSTEMI and junctional bradycardia -> dual chamber PPM placement but no  PCI.  During pitially admitted to the cardiac coronary unit;  In the ED pt was noted to have ST depressions and elevated cardiac enzymes. He was initially loaded with ASA, Brilinta and started on heparin gtt for ACS. However he was then noted to be anemic (Hgb 7.7, last documented 11.7). Therefore heparin was stopped and pRBC transfusion was ordered. The pt subsequently developed worsening bradycardia, with junctional rhythm in the low 40s. A TTE revealed severe LV dysfunction. The pt's prior angiogram was reviewed with Dr. Rivera and Dr. Kendrick. Given his prior angiogram which showed severe CAD not amenable to intervention as well as his anemia, he was deemed not to be a candidate for urgent cath. Dopamine gtt was started for the patient's bradycardia and a TVP was recommended. However, pt stated he does not want any invasive interventions, including TVP. Pt also stated he would not want CPR performed or to be intubated. Patient was completely alert and oriented at the time he made this decision. Admitted to CCU for further monitoring. Dopamine gtt weaned off in CCU at 3:20 pm on 2/19. Required 2nd unit of pRBC. GI was consulted and recommended endoscopy, which the patient declined. Hgb remained stable. EP was called and patient underwent dual chamber PPM without complication. The patient had some delirium following the procedure but slowly improved. Palliative care was consulted given the patient's triple vessel CAD and GI bleed - the family and patient deferred hospice planning.   pt has been progressivley declining mentally and functionally at home since last admission in Feb 2020. Patient has been having more difficult time taking care of self, has acted "delusional" on multiple occasions. Son states that patient appears deconditioned as pt never went to rehab after last admission and also visiting nursing services has not been going to patients house. Patient declining even more so in last 1.5 weeks.     	Patient denies any other complaint including chest pain, cough, shortness of breath, fevers.) Portions of H+P were taken from initial ED assessment     This is a 89-year old gentleman with a past medical history of triple-vessel coronary artery disease, history of coronary angioplasty last 2016, not amenable to PCI or CABG, DM2, HTN, who presented BIBEMS with the chief complaint of  nausea and general malaise for the past two days prior to admission.  Pt's son called for collateral information: Pt had just recently been hospitalized @ [facility] in February 2020 for NSTEMI and junctional bradycardia -> dual chamber PPM  placement but no PCI.  During prior hospital stay, pt was initially admitted to the cardiac coronary unit because, in the ED, he was noted to have ST depressions and elevated cardiac enzymes. He was initially loaded with ASA, Brilinta and  started on heparin gtt for ACS. However he was then noted to be anemic (Hgb 7.7, last documented 11.7). Therefore heparin was stopped and pRBC transfusion was ordered. The pt subsequently developed worsening bradycardia, with  junctional rhythm in the low 40s. A TTE revealed severe LV dysfunction. The pt's prior angiogram was reviewed with Dr. Rivera and Dr. Kendrick. Given his prior angiogram which showed severe CAD not amenable to intervention as well as his  anemia, he was deemed not to be a candidate for urgent cath. Dopamine gtt was started for the patient's bradycardia and a TVP was recommended. However, pt stated he does not want any invasive interventions, including TVP. Pt also  stated he would not want CPR performed or to be intubated. Patient was completely alert and oriented at the time he made this decision. Dopamine gtt weaned off in CCU on 2/19.  Required 2nd unit of pRBC. GI was  consulted and recommended endoscopy, which the patient declined. Hgb remained stable. EP was called and patient underwent dual chamber PPM without complication. The patient had some delirium following the procedure but slowly  improved. Palliative care was consulted given the patient's triple vessel CAD and GI bleed - the family and patient deferred hospice planning.  Since being discharged from prior hospital admission, pt has been progressivley declining mentally  and functionally at home as per son. Patient has been having more difficult time taking care of self, has acted "delusional" on multiple occasions. Son states that patient appears deconditioned as pt never went to rehab after last admission  and also visiting nursing services has not been going to patients house. Patient declining even more so in last 1.5 weeks.

## 2020-04-10 NOTE — H&P ADULT - PROBLEM SELECTOR PLAN 1
Given his failure to thrive and home furosemide/ARB usage, likely a combination of dehydration combined with medication side effects causing his LEONCIO in the setting of CKD3 which is 2' his diabetes mellitus and hypertension  Holding lasix and ARB for now  Urine lytes, bladder scan  Renal U/S  S/p IVF during night of admission, will reeval need for more fluids, but will likely allow CrCl to equilibrate back toward baseline on its own given his severely low global left ventricular systolic function  Appreciate nephrology consult

## 2020-04-10 NOTE — H&P ADULT - NSHPLABSRESULTS_GEN_ALL_CORE
LABS:                        10.6   6.09  )-----------( 260      ( 10 Apr 2020 13:15 )             34.8     04-10    135  |  94<L>  |  59<H>  ----------------------------<  235<H>  4.8   |  26  |  2.46<H>    Ca    9.4      10 Apr 2020 13:15  Mg     1.6     04-10    TPro  5.8<L>  /  Alb  3.2<L>  /  TBili  0.2  /  DBili  x   /  AST  12  /  ALT  6   /  AlkPhos  63  04-10      CAPILLARY BLOOD GLUCOSE      POCT Blood Glucose.: 240 mg/dL (10 Apr 2020 11:56)  POCT Blood Glucose.: 224 mg/dL (10 Apr 2020 09:48)  POCT Blood Glucose.: 197 mg/dL (10 Apr 2020 01:07)  POCT Blood Glucose.: 252 mg/dL (09 Apr 2020 23:31)              Patient name: MIKEY ANDRE  YOB: 1929   Age: 89 (M)   MR#: [id number]  Study Date: 2/19/2020  Location: O/PSonographer: Valentin Pascual [facility]  2nd Sonographer: Tara Anne M.D.  Study quality: Technically good  Referring Physician: Not Available Doctor, MD  Blood Pressure: 127/82 mmHg  Height: 163 cm  Weight: 64 kg  BSA: 1.7 m2  ------------------------------------------------------------------------  PROCEDURE: Transthoracic echocardiogram with 2-D, M-Mode  and complete spectral and color flow Doppler.  INDICATION: Other chest pain (R07.89)  ------------------------------------------------------------------------  DIMENSIONS:  Dimensions:     Normal Values:  LA:     3.9 cm    2.0 - 4.0 cm  Ao:     2.9 cm    2.0 - 3.8 cm  SEPTUM: 1.2 cm    0.6 - 1.2 cm  PWT:    1.2 cm    0.6 - 1.1 cm  LVIDd:    ---     3.0 - 5.6 cm  LVIDs:    ---     1.8 - 4.0 cm  ------------------------------------------------------------------------  OBSERVATIONS:  Mitral Valve: Tethered mitral valve leaflets with normal  opening. Moderate-severe mitral regurgitation.  Aortic Root: Normal aortic root.  Aortic Valve: Calcified trileaflet aortic valve with normal  opening. Mild aortic regurgitation.  Left Atrium: Severely dilated left atrium.  LA volume index  = 67 cc/m2.  Left Ventricle: Severe global left ventricular systolic  dysfunction. Normal left ventricular internal dimensions  and wall thicknesses.  Right Heart: Severe right atrial enlargement. Right  ventricular enlargement with decreased right ventricular  systolic function. Normal tricuspid valve. Moderate  tricuspid regurgitation. Normal pulmonic valve.  Pericardium/PleuraNormal pericardium with no pericardial  effusion.  Hemodynamic: Estimated right ventricular systolic pressure  equals 50 mm Hg, assuming right atrial pressure equals 10  mm Hg, consistent with moderate pulmonary hypertension.  ------------------------------------------------------------------------  CONCLUSIONS:  1. Tethered mitral valve leaflets with normal opening.  Moderate-severe mitral regurgitation.  2. Calcified trileaflet aortic valve with normal opening.  Mild aortic regurgitation.  3. Severe global left ventricular systolic dysfunction.  4. Right ventricular enlargement with decreased right  ventricular systolic function.  5. Normal tricuspid valve. Moderate tricuspid  regurgitation.  6. Estimated pulmonary artery systolic pressure equals 50  mm Hg, assuming right atrial pressure equals 10  mm Hg,  consistent with moderate pulmonary hypertension.  ------------------------------------------------------------------------  Confirmed on  2/19/2020 - 13:12:47 by Jada Burns MD

## 2020-04-11 NOTE — CONSULT NOTE ADULT - SUBJECTIVE AND OBJECTIVE BOX
[facility] NEPHROLOGY- CONSULTATION NOTE    89y Male with history of below presents with nausea and poor PO intake. Nephrology consulted for elevated Scr.    Patient appears to have a history of CKD-3 with baseline Scr 1.3? as per labs on prior admission. Patient c/o nausea with poor appetite for the past week. Patient also on lasix 40 mg daily and entresto for h/o CHF. Both medications held on admission with 1L LR on 4/9 and subsequent improvement in labs on 4/10.      REVIEW OF SYSTEMS:  Gen: no changes in weight  HEENT: no rhinorrhea  Neck: no sore throat  Cards: no chest pain  Resp: no dyspnea  GI: + nausea and poor PO intake now resolved, no vomiting or diarrhea  : no dysuria or hematuria  Vascular: no LE edema  Derm: no rashes  Neuro: no numbness/tingling    No Known Allergies      Home Medications Reviewed  Hospital Medications:   MEDICATIONS  (STANDING):  aspirin enteric coated 81 milliGRAM(s) Oral daily  dextrose 5%. 1000 milliLiter(s) (50 mL/Hr) IV Continuous <Continuous>  dextrose 50% Injectable 12.5 Gram(s) IV Push once  dextrose 50% Injectable 25 Gram(s) IV Push once  dextrose 50% Injectable 25 Gram(s) IV Push once  heparin  Injectable 5000 Unit(s) SubCutaneous every 12 hours  insulin lispro (HumaLOG) corrective regimen sliding scale   SubCutaneous three times a day before meals  insulin lispro (HumaLOG) corrective regimen sliding scale   SubCutaneous at bedtime  isosorbide   mononitrate ER Tablet (IMDUR) 120 milliGRAM(s) Oral daily  lactated ringers. 1000 milliLiter(s) (100 mL/Hr) IV Continuous <Continuous>  metoprolol succinate ER 25 milliGRAM(s) Oral daily  pantoprazole    Tablet 40 milliGRAM(s) Oral before breakfast  simvastatin 40 milliGRAM(s) Oral at bedtime      PAST MEDICAL & SURGICAL HISTORY:  Essential hypertension  DM (diabetes mellitus)  CAD (coronary artery disease)  H/O coronary angioplasty: last 2016, not amenable to PCI or CABG      FAMILY HISTORY:  Family history of coronary artery disease  Family history of diabetes mellitus      SOCIAL HISTORY:  Denies toxic substance use     VITALS:  T(F): 98.4 (04-11-20 @ 04:50), Max: 98.4 (04-10-20 @ 13:02)  HR: 80 (04-11-20 @ 04:50)  BP: 118/72 (04-11-20 @ 04:50)  RR: 17 (04-11-20 @ 04:50)  SpO2: 100% (04-11-20 @ 04:50)  Wt(kg): --    04-10 @ 07:01  -  04-11 @ 07:00  --------------------------------------------------------  IN: 400 mL / OUT: 50 mL / NET: 350 mL          PHYSICAL EXAM:  Gen: NAD, calm  HEENT: dry MM  Neck: no JVD  Cards: RRR, +S1/S2, no M/G/R  Resp: CTA B/L  GI: soft, NT/ND, NABS  : no CVA tenderness  Vascular: no LE edema B/L  Derm: no rashes  Neuro: non-focal    LABS:  04-10    135  |  94<L>  |  59<H>  ----------------------------<  235<H>  4.8   |  26  |  2.46<H>    Ca    9.4      10 Apr 2020 13:15  Mg     1.6     04-10    TPro  5.8<L>  /  Alb  3.2<L>  /  TBili  0.2  /  DBili      /  AST  12  /  ALT  6   /  AlkPhos  63  04-10    Creatinine Trend: 2.46 <--, 3.38 <--                        10.6   6.09  )-----------( 260      ( 10 Apr 2020 13:15 )             34.8     Urine Studies:        RADIOLOGY & ADDITIONAL STUDIES:  < from: US Kidney and Bladder (04.10.20 @ 16:33) >  IMPRESSION:     Sonographically normal kidneys. No hydronephrosis.    Enlarged prostate.    < end of copied text >          < from: Xray Chest 1 View- PORTABLE-Urgent (04.09.20 @ 17:11) >  IMPRESSION:   The lungs are clear.    < end of copied text >

## 2020-04-11 NOTE — CONSULT NOTE ADULT - ATTENDING COMMENTS
[facility] NEPHROLOGY  Marcelino Crespo M.D.  Loy Chavez D.O.  Carleen Mercedes M.D.  Stefany De León, MSN, ANP-C    Telephone: [phone]  Facsimile: [phone]    [address] [place] [address]

## 2020-04-11 NOTE — CONSULT NOTE ADULT - ASSESSMENT
89y Male with history of CHF presents with nausea and poor PO intake. Nephrology consulted for elevated Scr.    1) LEONCIO: likely secondary to pre-renal azotemia given poor PO intake on lasix and entresto as an outpatient with Scr now improving. Patient still appears dry but given improvement in appetite and h/o CHF with severe LVSD, would hold further IVF and let patient hydrate orally. Follow up labs today. Defer further work up (urine studies) unless Scr fails to improve. Renal US unremarkable. Avoid nephrotoxins.    2) CKD-3: in setting of HTN/DM with baseline Scr 1.3? Defer further CKD work up given advanced age. Monitor electrolytes.    3) HTN with CKD: BP controlled. Continue with current medications and low sodium diet. Holding entresto. Monitor BP.    4) LE edema: Holding diuretics as patient appears dry with LEONCIO. Monitor UO.

## 2020-04-11 NOTE — PROGRESS NOTE ADULT - PROBLEM SELECTOR PLAN 1
Given his failure to thrive and home furosemide/ARB usage, likely a combination of dehydration/poor PO intake combined with medication side effects causing his LEONCIO in the setting of CKD3 which is 2' his diabetes mellitus and hypertension  Holding lasix and ARB for now i/s/o LEONCIO    Renal U/S  S/p IVF during night of admission, will reeval need for more fluids, but will likely allow CrCl to equilibrate back toward baseline on its own given his severely low global left ventricular systolic function  Appreciate nephrology consult Given his failure to thrive and home furosemide/ARB usage, likely a combination of dehydration/poor PO intake combined with medication side effects causing his LEONCIO in the setting of CKD3 which is 2' his diabetes mellitus and hypertension  Holding lasix and ARB for now i/s/o LEONCIO  Renal U/S w/o acute pathology, hydronephrosis  S/p IVF during night of admission; holding further fluids given hx of CHF w/severe LV dysfunction  Appreciate nephrology consult: Urine studies ordered 4/11 PM after evening CMP showed persistently elevated Cr

## 2020-04-11 NOTE — PROGRESS NOTE ADULT - ATTENDING COMMENTS
Pt seen and examined 4/11/20    Agree with A+P above    Pt is breathing comfortably, not agitated or overly confused    Vitals/labs/imaging reviewed    There are no significant deviations in his physical exam from yesterday    Admitted with LENOCIO 2' poor PO intake and in setting of lasix/entresto usage for chronic systolic HF with severe global LV dysfunction.  We will continue to hold lasix and entresto.  Will hold off further IVF and allow his PO intake to reequilibrate his volume status, for fear of putting him into pulmonary edema from too much IVF.  Renal U/S did not show any hydronephrosis.    Appreciate PGY-1!

## 2020-04-11 NOTE — PROGRESS NOTE ADULT - PROBLEM SELECTOR PLAN 2
No evidence of acute coronary syndrome upon admission  If he has any acute chest pain or shortness of breath, we will check STAT EKG and troponin   Cont imdur, metoprolol, aspirin, and zocor No evidence of acute coronary syndrome upon admission  Cont imdur, metoprolol, aspirin, and zocor  EKG 4/11 with RBBB, 1st degree AV block, no acute SABRINA's or STD's

## 2020-04-11 NOTE — PROGRESS NOTE ADULT - SUBJECTIVE AND OBJECTIVE BOX
PROGRESS NOTE:     CONTACT INFO:  JAVIER Baum MD  Internal Medicine PGY1  Pager: [phone] [place]/ [id number] [facility]    Patient is a 89y old  Male who presents with a chief complaint of failure to thrive, poor PO intake (11 Apr 2020 09:19)      SUBJECTIVE / OVERNIGHT EVENTS:  No acute events overnight. Patient seen and evaluated at bedside. No fever/chills.  Denies SOB at rest, chest pain, palpitations, abdominal pain, nausea/vomiting    ADDITIONAL REVIEW OF SYSTEMS:    MEDICATIONS  (STANDING):  aspirin enteric coated 81 milliGRAM(s) Oral daily  dextrose 5%. 1000 milliLiter(s) (50 mL/Hr) IV Continuous <Continuous>  dextrose 50% Injectable 12.5 Gram(s) IV Push once  dextrose 50% Injectable 25 Gram(s) IV Push once  dextrose 50% Injectable 25 Gram(s) IV Push once  heparin  Injectable 5000 Unit(s) SubCutaneous every 12 hours  insulin lispro (HumaLOG) corrective regimen sliding scale   SubCutaneous three times a day before meals  insulin lispro (HumaLOG) corrective regimen sliding scale   SubCutaneous at bedtime  isosorbide   mononitrate ER Tablet (IMDUR) 120 milliGRAM(s) Oral daily  metoprolol succinate ER 25 milliGRAM(s) Oral daily  pantoprazole    Tablet 40 milliGRAM(s) Oral before breakfast  simvastatin 40 milliGRAM(s) Oral at bedtime    MEDICATIONS  (PRN):  dextrose 40% Gel 15 Gram(s) Oral once PRN Blood Glucose LESS THAN 70 milliGRAM(s)/deciliter  glucagon  Injectable 1 milliGRAM(s) IntraMuscular once PRN Glucose LESS THAN 70 milligrams/deciliter  ondansetron Injectable 4 milliGRAM(s) IV Push every 8 hours PRN Nausea and/or Vomiting      CAPILLARY BLOOD GLUCOSE      POCT Blood Glucose.: 174 mg/dL (11 Apr 2020 09:08)  POCT Blood Glucose.: 167 mg/dL (10 Apr 2020 22:08)  POCT Blood Glucose.: 157 mg/dL (10 Apr 2020 17:50)  POCT Blood Glucose.: 240 mg/dL (10 Apr 2020 11:56)    I&O's Summary    10 Apr 2020 07:01  -  11 Apr 2020 07:00  --------------------------------------------------------  IN: 400 mL / OUT: 50 mL / NET: 350 mL        PHYSICAL EXAM:  Vital Signs Last 24 Hrs  T(C): 36.9 (11 Apr 2020 04:50), Max: 36.9 (10 Apr 2020 13:02)  T(F): 98.4 (11 Apr 2020 04:50), Max: 98.4 (10 Apr 2020 13:02)  HR: 80 (11 Apr 2020 04:50) (79 - 80)  BP: 118/72 (11 Apr 2020 04:50) (105/65 - 118/72)  BP(mean): --  RR: 17 (11 Apr 2020 04:50) (17 - 17)  SpO2: 100% (11 Apr 2020 04:50) (100% - 100%)    CONSTITUTIONAL: NAD, well-developed  RESPIRATORY: Normal respiratory effort; lungs are clear to auscultation bilaterally  CARDIOVASCULAR: Regular rate and rhythm, normal S1 and S2, no murmur/rub/gallop; No lower extremity edema; Peripheral pulses are 2+ bilaterally  ABDOMEN: Nontender to palpation, normoactive bowel sounds, no rebound/guarding; No hepatosplenomegaly  MUSCLOSKELETAL: no clubbing or cyanosis of digits; no joint swelling or tenderness to palpation  PSYCH: A+O to person, place, and time; affect appropriate    LABS:                        10.6   6.09  )-----------( 260      ( 10 Apr 2020 13:15 )             34.8     04-10    135  |  94<L>  |  59<H>  ----------------------------<  235<H>  4.8   |  26  |  2.46<H>    Ca    9.4      10 Apr 2020 13:15  Mg     1.6     04-10    TPro  5.8<L>  /  Alb  3.2<L>  /  TBili  0.2  /  DBili  x   /  AST  12  /  ALT  6   /  AlkPhos  63  04-10                RADIOLOGY & ADDITIONAL TESTS:  Results Reviewed:   Imaging Personally Reviewed:  Electrocardiogram Personally Reviewed:    COORDINATION OF CARE:  Care Discussed with Consultants/Other Providers [Y/N]:  Prior or Outpatient Records Reviewed [Y/N]: PROGRESS NOTE:     CONTACT INFO:  JAVIER Baum MD  Internal Medicine PGY1  Pager: [phone] [place]/ [id number] [facility]    Patient is a 89y old  Male who presents with a chief complaint of failure to thrive, poor PO intake (11 Apr 2020 09:19)      SUBJECTIVE / OVERNIGHT EVENTS:  No acute events overnight. Patient seen and evaluated at bedside this AM. Patient reports feeling tired, falling asleep often and feeling lightheaded upon awakening; however, when awake patient is eating and drinking more frequently. Also reports mild nausea and transient chest pain. EKG performed with HR 88, no acute changes. Patient denies SOB, fever, cough, abdominal pain.     ADDITIONAL REVIEW OF SYSTEMS:  Negative unless specified otherwise    MEDICATIONS  (STANDING):  aspirin enteric coated 81 milliGRAM(s) Oral daily  dextrose 5%. 1000 milliLiter(s) (50 mL/Hr) IV Continuous <Continuous>  dextrose 50% Injectable 12.5 Gram(s) IV Push once  dextrose 50% Injectable 25 Gram(s) IV Push once  dextrose 50% Injectable 25 Gram(s) IV Push once  heparin  Injectable 5000 Unit(s) SubCutaneous every 12 hours  insulin lispro (HumaLOG) corrective regimen sliding scale   SubCutaneous three times a day before meals  insulin lispro (HumaLOG) corrective regimen sliding scale   SubCutaneous at bedtime  isosorbide   mononitrate ER Tablet (IMDUR) 120 milliGRAM(s) Oral daily  metoprolol succinate ER 25 milliGRAM(s) Oral daily  pantoprazole    Tablet 40 milliGRAM(s) Oral before breakfast  simvastatin 40 milliGRAM(s) Oral at bedtime    MEDICATIONS  (PRN):  dextrose 40% Gel 15 Gram(s) Oral once PRN Blood Glucose LESS THAN 70 milliGRAM(s)/deciliter  glucagon  Injectable 1 milliGRAM(s) IntraMuscular once PRN Glucose LESS THAN 70 milligrams/deciliter  ondansetron Injectable 4 milliGRAM(s) IV Push every 8 hours PRN Nausea and/or Vomiting      CAPILLARY BLOOD GLUCOSE      POCT Blood Glucose.: 174 mg/dL (11 Apr 2020 09:08)  POCT Blood Glucose.: 167 mg/dL (10 Apr 2020 22:08)  POCT Blood Glucose.: 157 mg/dL (10 Apr 2020 17:50)  POCT Blood Glucose.: 240 mg/dL (10 Apr 2020 11:56)    I&O's Summary    10 Apr 2020 07:01  -  11 Apr 2020 07:00  --------------------------------------------------------  IN: 400 mL / OUT: 50 mL / NET: 350 mL        PHYSICAL EXAM:  Vital Signs Last 24 Hrs  T(C): 36.9 (11 Apr 2020 04:50), Max: 36.9 (10 Apr 2020 13:02)  T(F): 98.4 (11 Apr 2020 04:50), Max: 98.4 (10 Apr 2020 13:02)  HR: 80 (11 Apr 2020 04:50) (79 - 80)  BP: 118/72 (11 Apr 2020 04:50) (105/65 - 118/72)  BP(mean): --  RR: 17 (11 Apr 2020 04:50) (17 - 17)  SpO2: 100% (11 Apr 2020 04:50) (100% - 100%)    Gen: NAD, AOx3, lethargic throughout day  	Head and Neck: No JVD appreciated  	HEENT: EOMI, PEERLA, normal conjunctiva, tongue midline, oral mucosa moist  	Lung: CTAB, no respiratory distress, no wheezes/rhonchi/rales B/L, speaking in full sentences.   	CV: RRR, no murmurs, rubs or gallops.   	Abd: soft, NT, ND, no guarding, no rigidity, no rebound tenderness, no CVA tenderness, no masses.   	MSK: no gross deformities, ROM normal in UE/LE, no back tenderness  	Neuro: CNs II-XII grossly intact. No focal sensory or motor deficits  	Skin: Warm, well perfused, no rash, no leg swelling    Psych: Appropriate mood and affect.    LABS:                        10.6   6.09  )-----------( 260      ( 10 Apr 2020 13:15 )             34.8     04-10    135  |  94<L>  |  59<H>  ----------------------------<  235<H>  4.8   |  26  |  2.46<H>    Ca    9.4      10 Apr 2020 13:15  Mg     1.6     04-10    TPro  5.8<L>  /  Alb  3.2<L>  /  TBili  0.2  /  DBili  x   /  AST  12  /  ALT  6   /  AlkPhos  63  04-10                RADIOLOGY & ADDITIONAL TESTS:  US Kidney and Bladder (04.10.20 @ 16:33) >  IMPRESSION:     Sonographically normal kidneys. No hydronephrosis.    Enlarged prostate.      COORDINATION OF CARE:  Care Discussed with Consultants/Other Providers [Y]:  Prior or Outpatient Records Reviewed [Y]:

## 2020-04-11 NOTE — PROGRESS NOTE ADULT - ASSESSMENT
89-year old gentleman with a past medical history of chronic systolic congestive heart failure, triple-vessel coronary artery disease, history of coronary angioplasty last 2016, not amenable to PCI or CABG, DM2, HTN, CKD3, who presented  BIBEMS with the chief complaint of nausea and general malaise for the past two days prior to admission. Admitted with LEONCIO. 89-year old gentleman with a past medical history of chronic systolic congestive heart failure, triple-vessel coronary artery disease, history of coronary angioplasty last 2016, not amenable to PCI or CABG, DM2, HTN, CKD3, who presented BIBEMS with the chief complaint of nausea and general malaise for the past two days prior to admission. Admitted with LEONCIO, likely i/s/o reduced PO intake 2/2 FTT

## 2020-04-12 NOTE — PROGRESS NOTE ADULT - PROBLEM SELECTOR PLAN 2
No evidence of acute coronary syndrome upon admission  Cont imdur, metoprolol, aspirin, and zocor  EKG 4/11 with RBBB, 1st degree AV block, no acute SABRINA's or STD's

## 2020-04-12 NOTE — DISCHARGE NOTE PROVIDER - CARE PROVIDER_API CALL
German Pineda)  Cardiology; Internal Medicine  [address], [address]  [place] [address]  Phone: [phone]  Fax: [phone]  Follow Up Time:

## 2020-04-12 NOTE — PROGRESS NOTE ADULT - ASSESSMENT
89-year old gentleman with a past medical history of chronic systolic congestive heart failure, triple-vessel coronary artery disease, history of coronary angioplasty last 2016, not amenable to PCI or CABG, DM2, HTN, CKD3, who presented BIBEMS with the chief complaint of nausea and general malaise for the past two days prior to admission. Admitted with LEONCOI, likely i/s/o reduced PO intake 2/2 FTT

## 2020-04-12 NOTE — PROGRESS NOTE ADULT - SUBJECTIVE AND OBJECTIVE BOX
[facility] NEPHROLOGY- PROGRESS NOTE    89y Male with history of CHF presents with nausea and poor PO intake. Nephrology consulted for elevated Scr.    REVIEW OF SYSTEMS: Unable to obtain as patient lethargic.    No Known Allergies      Hospital Medications: Medications reviewed    VITALS:  T(F): 97.5 (04-11-20 @ 21:03), Max: 98.3 (04-11-20 @ 18:23)  HR: 70 (04-12-20 @ 07:05)  BP: 114/69 (04-12-20 @ 07:05)  RR: 18 (04-11-20 @ 21:03)  SpO2: 100% (04-11-20 @ 21:03)  Wt(kg): --  Height (cm): 162.6 (04-10 @ 05:55)    04-11 @ 07:01  -  04-12 @ 07:00  --------------------------------------------------------  IN: 550 mL / OUT: 400 mL / NET: 150 mL        PHYSICAL EXAM:    Gen: Lethargic, dry MM  Cards: RRR, +S1/S2, no M/G/R  Resp: CTA B/L  GI: soft, NT/ND, NABS  Vascular: no LE edema B/L    LABS:  04-12    133<L>  |  95<L>  |  62<H>  ----------------------------<  165<H>  4.3   |  27  |  2.25<H>    Ca    9.0      12 Apr 2020 06:01  Phos  1.9     04-12  Mg     1.5     04-12    TPro  5.3<L>  /  Alb  2.8<L>  /  TBili  < 0.2<L>  /  DBili      /  AST  14  /  ALT  8   /  AlkPhos  59  04-11    Creatinine Trend: 2.25 <--, 2.30 <--, 2.46 <--, 3.38 <--                        10.0   6.25  )-----------( 202      ( 12 Apr 2020 06:15 )             31.0

## 2020-04-12 NOTE — PROGRESS NOTE ADULT - SUBJECTIVE AND OBJECTIVE BOX
Rayna Herrera MD  Resident Physician  Internal Medicine Prelim  PGY-1  Pager: [phone]    After 7PM please page [phone]    Patient is a 89y old  Male who presents with a chief complaint of failure to thrive, poor PO intake (11 Apr 2020 10:03)      INTERVAL HPI/OVERNIGHT EVENTS:        aspirin enteric coated 81 milliGRAM(s) Oral daily  dextrose 40% Gel 15 Gram(s) Oral once PRN  dextrose 5%. 1000 milliLiter(s) IV Continuous <Continuous>  dextrose 50% Injectable 12.5 Gram(s) IV Push once  dextrose 50% Injectable 25 Gram(s) IV Push once  dextrose 50% Injectable 25 Gram(s) IV Push once  glucagon  Injectable 1 milliGRAM(s) IntraMuscular once PRN  heparin  Injectable 5000 Unit(s) SubCutaneous every 12 hours  insulin lispro (HumaLOG) corrective regimen sliding scale   SubCutaneous three times a day before meals  insulin lispro (HumaLOG) corrective regimen sliding scale   SubCutaneous at bedtime  isosorbide   mononitrate ER Tablet (IMDUR) 120 milliGRAM(s) Oral daily  metoprolol succinate ER 25 milliGRAM(s) Oral daily  ondansetron Injectable 4 milliGRAM(s) IV Push every 8 hours PRN  pantoprazole    Tablet 40 milliGRAM(s) Oral before breakfast  simvastatin 40 milliGRAM(s) Oral at bedtime        Vital Signs Last 24 Hrs  T(C): 36.4 (11 Apr 2020 21:03), Max: 36.8 (11 Apr 2020 18:23)  T(F): 97.5 (11 Apr 2020 21:03), Max: 98.3 (11 Apr 2020 18:23)  HR: 73 (11 Apr 2020 21:03) (73 - 80)  BP: 97/58 (11 Apr 2020 21:03) (97/58 - 125/63)  BP(mean): --  RR: 18 (11 Apr 2020 21:03) (17 - 18)  SpO2: 100% (11 Apr 2020 21:03) (99% - 100%)  I&O's Summary    10 Apr 2020 07:01  -  11 Apr 2020 07:00  --------------------------------------------------------  IN: 400 mL / OUT: 50 mL / NET: 350 mL    11 Apr 2020 07:01  -  12 Apr 2020 06:56  --------------------------------------------------------  IN: 500 mL / OUT: 200 mL / NET: 300 mL        PHYSICAL EXAM:  General:  NAD  HEENT: PERRLA, EOMI, moist mucous membranes  Neurology: A&Ox3, nonfocal, RIDLEY x 4  Respiratory: CTA B/L, normal respiratory effort, no wheezes, crackles, rales  CV: RRR, S1S2, no murmurs, rubs or gallops  Abdominal: Soft, NT, ND +BS, Last BM  Extremities: No edema, + peripheral pulses  Incisions:   Tubes:    LABS:                        10.6   6.09  )-----------( 260      ( 10 Apr 2020 13:15 )             34.8     Hb Trend: 10.6<--, 11.8<--  WBC Trend: 6.09<--, 7.72<--  Plt Trend: 260<--, 306<--          04-11    132<L>  |  95<L>  |  61<H>  ----------------------------<  203<H>  4.7   |  27  |  2.30<H>    Ca    8.8      11 Apr 2020 16:00  Mg     1.6     04-10    TPro  5.3<L>  /  Alb  2.8<L>  /  TBili  < 0.2<L>  /  DBili  x   /  AST  14  /  ALT  8   /  AlkPhos  59  04-11          CAPILLARY BLOOD GLUCOSE      POCT Blood Glucose.: 239 mg/dL (11 Apr 2020 21:09)  POCT Blood Glucose.: 199 mg/dL (11 Apr 2020 17:03)  POCT Blood Glucose.: 215 mg/dL (11 Apr 2020 11:52)  POCT Blood Glucose.: 174 mg/dL (11 Apr 2020 09:08)              RADIOLOGY & ADDITIONAL TESTS:    Imaging Personally Reviewed:  [ ] YES  [ ] NO [ ] No New Imaging Last 24hrs    Consultant(s) Notes Reviewed:  [x ] YES  [ ] NO    Care Discussed with Consultants/Other Providers [ x] YES  [ ] NO Rayna Herrera MD  Resident Physician  Internal Medicine Prelim  PGY-1  Pager: [phone]    After 7PM please page [phone]    Patient is a 89y old  Male who presents with a chief complaint of failure to thrive, poor PO intake (11 Apr 2020 10:03)      INTERVAL HPI/OVERNIGHT EVENTS: no acute events overnight. Pt seen and examined at bedside        aspirin enteric coated 81 milliGRAM(s) Oral daily  dextrose 40% Gel 15 Gram(s) Oral once PRN  dextrose 5%. 1000 milliLiter(s) IV Continuous <Continuous>  dextrose 50% Injectable 12.5 Gram(s) IV Push once  dextrose 50% Injectable 25 Gram(s) IV Push once  dextrose 50% Injectable 25 Gram(s) IV Push once  glucagon  Injectable 1 milliGRAM(s) IntraMuscular once PRN  heparin  Injectable 5000 Unit(s) SubCutaneous every 12 hours  insulin lispro (HumaLOG) corrective regimen sliding scale   SubCutaneous three times a day before meals  insulin lispro (HumaLOG) corrective regimen sliding scale   SubCutaneous at bedtime  isosorbide   mononitrate ER Tablet (IMDUR) 120 milliGRAM(s) Oral daily  metoprolol succinate ER 25 milliGRAM(s) Oral daily  ondansetron Injectable 4 milliGRAM(s) IV Push every 8 hours PRN  pantoprazole    Tablet 40 milliGRAM(s) Oral before breakfast  simvastatin 40 milliGRAM(s) Oral at bedtime        Vital Signs Last 24 Hrs  T(C): 36.4 (11 Apr 2020 21:03), Max: 36.8 (11 Apr 2020 18:23)  T(F): 97.5 (11 Apr 2020 21:03), Max: 98.3 (11 Apr 2020 18:23)  HR: 73 (11 Apr 2020 21:03) (73 - 80)  BP: 97/58 (11 Apr 2020 21:03) (97/58 - 125/63)  BP(mean): --  RR: 18 (11 Apr 2020 21:03) (17 - 18)  SpO2: 100% (11 Apr 2020 21:03) (99% - 100%)  I&O's Summary    10 Apr 2020 07:01  -  11 Apr 2020 07:00  --------------------------------------------------------  IN: 400 mL / OUT: 50 mL / NET: 350 mL    11 Apr 2020 07:01  -  12 Apr 2020 06:56  --------------------------------------------------------  IN: 500 mL / OUT: 200 mL / NET: 300 mL        PHYSICAL EXAM:  General:  NAD  HEENT: PERRLA, EOMI, moist mucous membranes  Neurology: A&Ox3, nonfocal, RIDLEY x 4  Respiratory: CTA B/L, normal respiratory effort, no wheezes, crackles, rales  CV: RRR, S1S2, no murmurs, rubs or gallops  Abdominal: Soft, NT, ND +BS, Last BM  Extremities: No edema, + peripheral pulses  Incisions:   Tubes:    LABS:                        10.6   6.09  )-----------( 260      ( 10 Apr 2020 13:15 )             34.8     Hb Trend: 10.6<--, 11.8<--  WBC Trend: 6.09<--, 7.72<--  Plt Trend: 260<--, 306<--          04-11    132<L>  |  95<L>  |  61<H>  ----------------------------<  203<H>  4.7   |  27  |  2.30<H>    Ca    8.8      11 Apr 2020 16:00  Mg     1.6     04-10    TPro  5.3<L>  /  Alb  2.8<L>  /  TBili  < 0.2<L>  /  DBili  x   /  AST  14  /  ALT  8   /  AlkPhos  59  04-11          CAPILLARY BLOOD GLUCOSE      POCT Blood Glucose.: 239 mg/dL (11 Apr 2020 21:09)  POCT Blood Glucose.: 199 mg/dL (11 Apr 2020 17:03)  POCT Blood Glucose.: 215 mg/dL (11 Apr 2020 11:52)  POCT Blood Glucose.: 174 mg/dL (11 Apr 2020 09:08)              RADIOLOGY & ADDITIONAL TESTS:    Imaging Personally Reviewed:  [ ] YES  [ ] NO [ ] No New Imaging Last 24hrs    Consultant(s) Notes Reviewed:  [x ] YES  [ ] NO    Care Discussed with Consultants/Other Providers [ x] YES  [ ] NO Rayna Herrera MD  Resident Physician  Internal Medicine Prelim  PGY-1  Pager: [phone]    After 7PM please page [phone]    Patient is a 89y old  Male who presents with a chief complaint of failure to thrive, poor PO intake (11 Apr 2020 10:03)      INTERVAL HPI/OVERNIGHT EVENTS: No acute events overnight. Patient seen and examined at bedside. Patient reports feeling tired.Patient denies SOB, fever, cough, abdominal pain.        aspirin enteric coated 81 milliGRAM(s) Oral daily  dextrose 40% Gel 15 Gram(s) Oral once PRN  dextrose 5%. 1000 milliLiter(s) IV Continuous <Continuous>  dextrose 50% Injectable 12.5 Gram(s) IV Push once  dextrose 50% Injectable 25 Gram(s) IV Push once  dextrose 50% Injectable 25 Gram(s) IV Push once  glucagon  Injectable 1 milliGRAM(s) IntraMuscular once PRN  heparin  Injectable 5000 Unit(s) SubCutaneous every 12 hours  insulin lispro (HumaLOG) corrective regimen sliding scale   SubCutaneous three times a day before meals  insulin lispro (HumaLOG) corrective regimen sliding scale   SubCutaneous at bedtime  isosorbide   mononitrate ER Tablet (IMDUR) 120 milliGRAM(s) Oral daily  metoprolol succinate ER 25 milliGRAM(s) Oral daily  ondansetron Injectable 4 milliGRAM(s) IV Push every 8 hours PRN  pantoprazole    Tablet 40 milliGRAM(s) Oral before breakfast  simvastatin 40 milliGRAM(s) Oral at bedtime        Vital Signs Last 24 Hrs  T(C): 36.4 (11 Apr 2020 21:03), Max: 36.8 (11 Apr 2020 18:23)  T(F): 97.5 (11 Apr 2020 21:03), Max: 98.3 (11 Apr 2020 18:23)  HR: 73 (11 Apr 2020 21:03) (73 - 80)  BP: 97/58 (11 Apr 2020 21:03) (97/58 - 125/63)  BP(mean): --  RR: 18 (11 Apr 2020 21:03) (17 - 18)  SpO2: 100% (11 Apr 2020 21:03) (99% - 100%)  I&O's Summary    10 Apr 2020 07:01  -  11 Apr 2020 07:00  --------------------------------------------------------  IN: 400 mL / OUT: 50 mL / NET: 350 mL    11 Apr 2020 07:01  -  12 Apr 2020 06:56  --------------------------------------------------------  IN: 500 mL / OUT: 200 mL / NET: 300 mL        PHYSICAL EXAM:  General:  NAD  HEENT: dry ous membranes  Neurology: A&Ox3, nonfocal, RIDLEY x 4  Respiratory: CTA B/L, normal respiratory effort, no wheezes, crackles, rales  CV: RRR, S1S2, no murmurs, rubs or gallops  Abdominal: Soft, NT, ND +BS, Last BM  Extremities: No edema, + peripheral pulses  Incisions:   Tubes:    LABS:                        10.6   6.09  )-----------( 260      ( 10 Apr 2020 13:15 )             34.8     Hb Trend: 10.6<--, 11.8<--  WBC Trend: 6.09<--, 7.72<--  Plt Trend: 260<--, 306<--          04-11    132<L>  |  95<L>  |  61<H>  ----------------------------<  203<H>  4.7   |  27  |  2.30<H>    Ca    8.8      11 Apr 2020 16:00  Mg     1.6     04-10    TPro  5.3<L>  /  Alb  2.8<L>  /  TBili  < 0.2<L>  /  DBili  x   /  AST  14  /  ALT  8   /  AlkPhos  59  04-11          CAPILLARY BLOOD GLUCOSE      POCT Blood Glucose.: 239 mg/dL (11 Apr 2020 21:09)  POCT Blood Glucose.: 199 mg/dL (11 Apr 2020 17:03)  POCT Blood Glucose.: 215 mg/dL (11 Apr 2020 11:52)  POCT Blood Glucose.: 174 mg/dL (11 Apr 2020 09:08)              RADIOLOGY & ADDITIONAL TESTS:    Imaging Personally Reviewed:  [ ] YES  [ ] NO [ ] No New Imaging Last 24hrs    Consultant(s) Notes Reviewed:  [x ] YES  [ ] NO    Care Discussed with Consultants/Other Providers [ x] YES  [ ] NO Rayna Herrera MD  Resident Physician  Internal Medicine Prelim  PGY-1  Pager: [phone]    After 7PM please page [phone]    Patient is a 89y old  Male who presents with a chief complaint of failure to thrive, poor PO intake (11 Apr 2020 10:03)      INTERVAL HPI/OVERNIGHT EVENTS: No acute events overnight. Patient seen and examined at bedside. Patient reports feeling tired. Patient denies SOB, fever, cough, abdominal pain.        aspirin enteric coated 81 milliGRAM(s) Oral daily  dextrose 40% Gel 15 Gram(s) Oral once PRN  dextrose 5%. 1000 milliLiter(s) IV Continuous <Continuous>  dextrose 50% Injectable 12.5 Gram(s) IV Push once  dextrose 50% Injectable 25 Gram(s) IV Push once  dextrose 50% Injectable 25 Gram(s) IV Push once  glucagon  Injectable 1 milliGRAM(s) IntraMuscular once PRN  heparin  Injectable 5000 Unit(s) SubCutaneous every 12 hours  insulin lispro (HumaLOG) corrective regimen sliding scale   SubCutaneous three times a day before meals  insulin lispro (HumaLOG) corrective regimen sliding scale   SubCutaneous at bedtime  isosorbide   mononitrate ER Tablet (IMDUR) 120 milliGRAM(s) Oral daily  metoprolol succinate ER 25 milliGRAM(s) Oral daily  ondansetron Injectable 4 milliGRAM(s) IV Push every 8 hours PRN  pantoprazole    Tablet 40 milliGRAM(s) Oral before breakfast  simvastatin 40 milliGRAM(s) Oral at bedtime        Vital Signs Last 24 Hrs  T(C): 36.4 (11 Apr 2020 21:03), Max: 36.8 (11 Apr 2020 18:23)  T(F): 97.5 (11 Apr 2020 21:03), Max: 98.3 (11 Apr 2020 18:23)  HR: 73 (11 Apr 2020 21:03) (73 - 80)  BP: 97/58 (11 Apr 2020 21:03) (97/58 - 125/63)  BP(mean): --  RR: 18 (11 Apr 2020 21:03) (17 - 18)  SpO2: 100% (11 Apr 2020 21:03) (99% - 100%)  I&O's Summary    10 Apr 2020 07:01  -  11 Apr 2020 07:00  --------------------------------------------------------  IN: 400 mL / OUT: 50 mL / NET: 350 mL    11 Apr 2020 07:01  -  12 Apr 2020 06:56  --------------------------------------------------------  IN: 500 mL / OUT: 200 mL / NET: 300 mL        PHYSICAL EXAM:  General:  NAD  HEENT: dry ous membranes  Neurology: A&Ox3, nonfocal, RIDLEY x 4  Respiratory: CTA B/L, normal respiratory effort, no wheezes, crackles, rales  CV: RRR, S1S2, no murmurs, rubs or gallops  Abdominal: Soft, NT, ND +BS, Last BM  Extremities: No edema, + peripheral pulses  Incisions:   Tubes:    LABS:                        10.6   6.09  )-----------( 260      ( 10 Apr 2020 13:15 )             34.8     Hb Trend: 10.6<--, 11.8<--  WBC Trend: 6.09<--, 7.72<--  Plt Trend: 260<--, 306<--          04-11    132<L>  |  95<L>  |  61<H>  ----------------------------<  203<H>  4.7   |  27  |  2.30<H>    Ca    8.8      11 Apr 2020 16:00  Mg     1.6     04-10    TPro  5.3<L>  /  Alb  2.8<L>  /  TBili  < 0.2<L>  /  DBili  x   /  AST  14  /  ALT  8   /  AlkPhos  59  04-11          CAPILLARY BLOOD GLUCOSE      POCT Blood Glucose.: 239 mg/dL (11 Apr 2020 21:09)  POCT Blood Glucose.: 199 mg/dL (11 Apr 2020 17:03)  POCT Blood Glucose.: 215 mg/dL (11 Apr 2020 11:52)  POCT Blood Glucose.: 174 mg/dL (11 Apr 2020 09:08)              RADIOLOGY & ADDITIONAL TESTS:    Imaging Personally Reviewed:  [ ] YES  [ ] NO [ ] No New Imaging Last 24hrs    Consultant(s) Notes Reviewed:  [x ] YES  [ ] NO    Care Discussed with Consultants/Other Providers [ x] YES  [ ] NO

## 2020-04-12 NOTE — DISCHARGE NOTE PROVIDER - HOSPITAL COURSE
This is a 89-year old gentleman with a past medical history of triple-vessel coronary artery disease, history of coronary angioplasty last 2016, not amenable to PCI or CABG, DM2, HTN, who presented BIBEMS with the chief complaint of nausea and general malaise for the past two days prior to admission. Admitted with LEONCIO, sCr 3.38. Started on IVF, Entresto held,  Nephrology consulted for elevated Scr. Urine studies... This is a 89-year old gentleman with a past medical history of triple-vessel coronary artery disease, history of coronary angioplasty last 2016, not amenable to PCI or CABG, DM2, HTN, who presented BIBEMS with the chief complaint of nausea and general malaise for the past two days prior to admission. Admitted with LEONCIO, sCr 3.38. Started on IVF, Entresto held. Renal ultrasound unremarkable. Nephrology consulted for elevated Scr 3.38. UA bland with low FeNa for which patient was given gentle IVF with improvement in renal function. Electrolytes monitored with hyponatremia and hypophosphatemia noted. Patient tolerated diet. sCR improved to 1.79. Patient was seen by PT/OT recommended home PT.     Patient deem stable for discharge home

## 2020-04-12 NOTE — PROGRESS NOTE ADULT - ASSESSMENT
89y Male with history of CHF presents with nausea and poor PO intake. Nephrology consulted for elevated Scr.    1) LEONCIO: likely secondary to pre-renal azotemia given poor PO intake on lasix and entresto as an outpatient. Scr overall improved but remains above baseline. Check UA, urine sodium and urine creatinine. Gentle IVF as patient still appears dry with likely poor PO intake. Renal US unremarkable. Avoid nephrotoxins.    2) CKD-3: in setting of HTN/DM with baseline Scr 1.3? Defer further CKD work up given advanced age. Monitor electrolytes.    3) HTN with CKD: BP controlled. Continue with current medications and low sodium diet. Holding entresto. Monitor BP.    4) LE edema: Holding diuretics as patient appears dry with LEONCIO. IVF as above. Monitor UO.

## 2020-04-12 NOTE — DISCHARGE NOTE PROVIDER - NSDCMRMEDTOKEN_GEN_ALL_CORE_FT
aspirin 81 mg oral delayed release tablet: 1 tab(s) orally once a day  cholecalciferol oral tablet: 5000 unit(s) orally once a day  escitalopram 5 mg oral tablet: 1 tab(s) orally once a day  folic acid 1 mg oral tablet: 1 tab(s) orally once a day  furosemide 40 mg oral tablet: 1 tab(s) orally once a day  isosorbide mononitrate 120 mg oral tablet, extended release: 1 tab(s) orally once a day  pantoprazole 40 mg oral delayed release tablet: 1 tab(s) orally once a day (before a meal)  sacubitril-valsartan 49 mg-51 mg oral tablet: 1 tab(s) orally 2 times a day  simvastatin 40 mg oral tablet: 1 tab(s) orally once a day (at bedtime)  Toprol-XL 25 mg oral tablet, extended release: 1 tab(s) orally once a day aspirin 81 mg oral delayed release tablet: 1 tab(s) orally once a day  cholecalciferol oral tablet: 5000 unit(s) orally once a day  escitalopram 5 mg oral tablet: 1 tab(s) orally once a day  folic acid 1 mg oral tablet: 1 tab(s) orally once a day  furosemide 40 mg oral tablet: 1 tab(s) orally once a day  Glucerna: 1 each orally 2 times a day   isosorbide mononitrate 120 mg oral tablet, extended release: 1 tab(s) orally once a day  pantoprazole 40 mg oral delayed release tablet: 1 tab(s) orally once a day (before a meal)  sacubitril-valsartan 49 mg-51 mg oral tablet: 1 tab(s) orally 2 times a day  simvastatin 40 mg oral tablet: 1 tab(s) orally once a day (at bedtime)  Toprol-XL 25 mg oral tablet, extended release: 1 tab(s) orally once a day aspirin 81 mg oral delayed release tablet: 1 tab(s) orally once a day  cholecalciferol oral tablet: 5000 unit(s) orally once a day  escitalopram 5 mg oral tablet: 1 tab(s) orally once a day  folic acid 1 mg oral tablet: 1 tab(s) orally once a day  furosemide 40 mg oral tablet: 1 tab(s) orally Monday, Wednesday, and Friday   Glucerna: 1 each orally 2 times a day   isosorbide mononitrate 120 mg oral tablet, extended release: 1 tab(s) orally once a day  metFORMIN 1000 mg oral tablet: 1 tab(s) orally 2 times a day  pantoprazole 40 mg oral delayed release tablet: 1 tab(s) orally once a day (before a meal)  sacubitril-valsartan 49 mg-51 mg oral tablet: 1 tab(s) orally 2 times a day  simvastatin 40 mg oral tablet: 1 tab(s) orally once a day (at bedtime) aspirin 81 mg oral delayed release tablet: 1 tab(s) orally once a day  cholecalciferol oral tablet: 5000 unit(s) orally once a day  escitalopram 5 mg oral tablet: 1 tab(s) orally once a day  folic acid 1 mg oral tablet: 1 tab(s) orally once a day  furosemide 40 mg oral tablet: 1 tab(s) orally Monday, Wednesday, and Friday   Glucerna: 1 each orally 2 times a day   isosorbide mononitrate 120 mg oral tablet, extended release: 1 tab(s) orally once a day  metFORMIN 1000 mg oral tablet: 1 tab(s) orally 2 times a day  pantoprazole 40 mg oral delayed release tablet: 1 tab(s) orally once a day (before a meal)  simvastatin 40 mg oral tablet: 1 tab(s) orally once a day (at bedtime)

## 2020-04-12 NOTE — PROGRESS NOTE ADULT - ATTENDING COMMENTS
[facility] NEPHROLOGY  Marcelino Crespo M.D.  Loy Chavez D.O.  Carleen Mercedes M.D.  Stefany De León, MSN, ANP-C    Telephone: [phone]  Facsimile: [phone]    [address] [place] [address]

## 2020-04-12 NOTE — PROGRESS NOTE ADULT - PROBLEM SELECTOR PLAN 1
Given his failure to thrive and home furosemide/ARB usage, likely a combination of dehydration/poor PO intake combined with medication side effects causing his LEONCIO in the setting of CKD3 which is 2' his diabetes mellitus and hypertension  Holding lasix and ARB for now i/s/o LEONCIO  Renal U/S w/o acute pathology, hydronephrosis  S/p IVF during night of admission; holding further fluids given hx of CHF w/severe LV dysfunction  Appreciate nephrology consult: Urine studies ordered 4/11 PM after evening CMP showed persistently elevated Cr Given his failure to thrive and home furosemide/ARB usage, likely a combination of dehydration/poor PO intake combined with medication side effects causing his LEONCIO in the setting of CKD3 which is 2' his diabetes mellitus and hypertension  Holding lasix and ARB for now i/s/o LEONCIO  Renal U/S w/o acute pathology, hydronephrosis  S/p IVF during night of admission; will start gentle IVF as pt appears dry  Appreciate nephrology consult: Urine studies ordered 4/11 PM after evening CMP showed persistently elevated Cr Given his failure to thrive and home furosemide/ARB usage, likely a combination of dehydration/poor PO intake combined with medication side effects causing his LEONCIO in the setting of CKD3 which is 2' his diabetes mellitus and hypertension  Holding lasix and ARB for now i/s/o LEONCIO  Renal U/S w/o acute pathology, hydronephrosis  S/p IVF during night of admission; will start gentle IVF as pt appears dry  Appreciate nephrology consult: Urine studies ordered 4/11 PM after evening CMP showed persistently elevated Cr. IVF restarted

## 2020-04-12 NOTE — DISCHARGE NOTE PROVIDER - NSDCCPCAREPLAN_GEN_ALL_CORE_FT
PRINCIPAL DISCHARGE DIAGNOSIS  Diagnosis: LEONCIO (acute kidney injury)  Assessment and Plan of Treatment: You were seen and treaed for acute kidney injury. This is likely related to saveral factors including poor food/liquid intake on Lasix and Entresto at home. You were treated with IV fluids and we held your Entresto and Lasix.  You can resume Lasix for  Mondays, Wednesdays and Fridays. Do not continue your Entresto until you follow up with with Dr. Pineda, your primary care provider follow up with Dr. Pineda with in 3 days.   Given the COVID-19 pandemic many offices are arranging patient visits by telephone or video. Please contact Dr. Pineda to schedule telephonen appointment.      SECONDARY DISCHARGE DIAGNOSES  Diagnosis: Failure to thrive in adult  Assessment and Plan of Treatment: Your nutritional intake improved significantly during your admission. Continue to supplement your diet with Glucerna shakes.  Follow up with Dr. Pineda with in 3 days.    Diagnosis: CAD (coronary artery disease)  Assessment and Plan of Treatment: Continue aspirin, zocor and Entresto.  Continue low salt, fat, cholesterol and carbohydrate diet. Follow up with cardiologist and primary care physician's routine appointment.    Diagnosis: DM (diabetes mellitus)  Assessment and Plan of Treatment: Continue home medications. Monitor finger sticks pre-meal and bedtime, low salt, fat and carbohydrate diet, minimize glucose intake.  Exercise daily for at least 30 minutes and weight loss.  Follow up with primary care physician and endocrinologist for routine Hemoglobin A1C checks and management.  Follow up with your ophthalmologist for routine yearly vision exams.

## 2020-04-13 NOTE — PROGRESS NOTE ADULT - PROBLEM/PLAN-4
Patient was called with information below, daughter Shira answered the phone and she does have permission to speak with us. Patient was also on the phone but did not have much of a voice and was coughing so daughter put me on speaker phone. Patient and daughter confirmed  of patient, information was given to them below and they understood to now take a half a tablet of the Levaquin 500 mg tablet x6 days. Patients daughter also asking about the robitussin cough medication, pharmacy told them they could buy over the counter but is also the same as mucinex. Daughter asking how much she can take per day. Writer discussed with provider and it is ok to take per the package instructions for an adult and is ok to take with the Levaquin. Daughter understood, she is also concerned with her fever as it is back up to 100. Patient was made aware to continue with tylenol as long as she is not taking any other OTC products with tylenol in it she needs to watch the limit per day. If symptoms continue to get worse patient needs to present to the ER for further work up. Daughter/patient understood and did not have any further questions or concerns.    DISPLAY PLAN FREE TEXT

## 2020-04-13 NOTE — PROGRESS NOTE ADULT - ASSESSMENT
89-year old gentleman with a past medical history of chronic systolic congestive heart failure, triple-vessel coronary artery disease, history of coronary angioplasty last 2016, not amenable to PCI or CABG, DM2, HTN, CKD3, who presented BIBEMS with the chief complaint of nausea and general malaise for the past two days prior to admission. Admitted with LEONCIO, likely i/s/o reduced PO intake 2/2 FTT

## 2020-04-13 NOTE — PROGRESS NOTE ADULT - SUBJECTIVE AND OBJECTIVE BOX
[facility] NEPHROLOGY- PROGRESS NOTE    89y Male with history of CHF presents with nausea and poor PO intake. Nephrology consulted for elevated Scr.    REVIEW OF SYSTEMS: Unable to obtain as patient lethargic.    No Known Allergies      Hospital Medications: Medications reviewed      VITALS:  T(F): 97.5 (20 @ 05:15), Max: 97.5 (20 @ 22:49)  HR: 66 (20 @ 05:15)  BP: 119/64 (20 @ 05:15)  RR: 19 (20 @ 05:15)  SpO2: 100% (20 @ 05:15)  Wt(kg): --        PHYSICAL EXAM:    Gen: Lethargic, dry MM  Cards: RRR, +S1/S2, no M/G/R  Resp: CTA B/L  GI: soft, NT/ND, NABS  Vascular: no LE edema B/L      LABS:      133<L>  |  95<L>  |  62<H>  ----------------------------<  165<H>  4.3   |  27  |  2.25<H>    Ca    9.0      2020 06:01  Phos  1.9       Mg     1.5         TPro  5.3<L>  /  Alb  2.8<L>  /  TBili  < 0.2<L>  /  DBili      /  AST  14  /  ALT  8   /  AlkPhos  59  04-11    Creatinine Trend: 2.25 <--, 2.30 <--, 2.46 <--, 3.38 <--                        10.0   6.25  )-----------( 202      ( 2020 06:15 )             31.0     Urine Studies:  Urinalysis Basic - ( 2020 15:07 )    Color: LIGHT YELLOW / Appearance: CLEAR / S.020 / pH: 5.5  Gluc: 300 / Ketone: NEGATIVE  / Bili: NEGATIVE / Urobili: NORMAL   Blood: NEGATIVE / Protein: 20 / Nitrite: NEGATIVE   Leuk Esterase: NEGATIVE / RBC: 3-5 / WBC 0-2   Sq Epi: OCC / Non Sq Epi:  / Bacteria: NEGATIVE      Sodium, Random Urine: 20 mmol/L ( @ 15:05)  Creatinine, Random Urine: 78.90 mg/dL ( @ 15:05)

## 2020-04-13 NOTE — PROGRESS NOTE ADULT - PROBLEM SELECTOR PLAN 1
Given his failure to thrive and home furosemide/ARB usage, likely a combination of dehydration/poor PO intake combined with medication side effects causing his LEONCIO in the setting of CKD3 which is 2' his diabetes mellitus and hypertension  Holding lasix and ARB for now i/s/o LEONCIO  Renal U/S w/o acute pathology, hydronephrosis  S/p IVF during night of admission; will start gentle IVF as pt appears dry  Appreciate nephrology consult: Urine studies ordered 4/11 PM after evening CMP showed persistently elevated Cr. IVF restarted

## 2020-04-13 NOTE — PROGRESS NOTE ADULT - ASSESSMENT
89y Male with history of CHF presents with nausea and poor PO intake. Nephrology consulted for elevated Scr.    1) LOENCIO: likely secondary to pre-renal azotemia given poor PO intake on lasix and entresto as an outpatient. UA bland with low FeNa for which patient was given gentle IVF on 4/12. Follow up renal panel today. Encourage PO intake. Renal US unremarkable. Avoid nephrotoxins.    2) CKD-3: in setting of HTN/DM with baseline Scr 1.3? UA with proteinuria however defer further CKD work up given advanced age. Monitor electrolytes.    3) HTN with CKD: BP controlled. Continue with current medications and low sodium diet. Holding entresto. Monitor BP.    4) LE edema: Holding diuretics. Monitor UO.    No renal objection to discharge pending repeat lab results today. Would hold entresto on discharge and can resume lasix on 4/15 at 40 mg every MWF with close follow up with patients cardiologist as an outpatient.

## 2020-04-13 NOTE — PROGRESS NOTE ADULT - ATTENDING COMMENTS
[facility] NEPHROLOGY  Marcelino Crespo M.D.  Loy Chavez D.O.  Carleen Mercedes M.D.  Stefany De León, MSN, ANP-C    Telephone: [phone]  Facsimile: [phone]    [address] [place] [address]

## 2020-04-13 NOTE — PROGRESS NOTE ADULT - SUBJECTIVE AND OBJECTIVE BOX
Rayna Herrera MD  Resident Physician  Internal Medicine Prelim  PGY-1  Pager: [phone]    After 7PM please page [phone]    Patient is a 89y old  Male who presents with a chief complaint of failure to thrive, poor PO intake (2020 14:22)      INTERVAL HPI/OVERNIGHT EVENTS:        aspirin enteric coated 81 milliGRAM(s) Oral daily  dextrose 40% Gel 15 Gram(s) Oral once PRN  dextrose 5%. 1000 milliLiter(s) IV Continuous <Continuous>  dextrose 50% Injectable 12.5 Gram(s) IV Push once  dextrose 50% Injectable 25 Gram(s) IV Push once  dextrose 50% Injectable 25 Gram(s) IV Push once  glucagon  Injectable 1 milliGRAM(s) IntraMuscular once PRN  heparin  Injectable 5000 Unit(s) SubCutaneous every 12 hours  insulin glargine Injectable (LANTUS) 2 Unit(s) SubCutaneous at bedtime  insulin lispro (HumaLOG) corrective regimen sliding scale   SubCutaneous three times a day before meals  insulin lispro (HumaLOG) corrective regimen sliding scale   SubCutaneous at bedtime  insulin lispro Injectable (HumaLOG) 2 Unit(s) SubCutaneous three times a day before meals  isosorbide   mononitrate ER Tablet (IMDUR) 120 milliGRAM(s) Oral daily  metoprolol succinate ER 25 milliGRAM(s) Oral daily  ondansetron Injectable 4 milliGRAM(s) IV Push every 8 hours PRN  pantoprazole    Tablet 40 milliGRAM(s) Oral before breakfast  simvastatin 40 milliGRAM(s) Oral at bedtime  sodium chloride 0.9%. 1000 milliLiter(s) IV Continuous <Continuous>        Vital Signs Last 24 Hrs  T(C): 36.4 (2020 05:15), Max: 36.6 (2020 11:56)  T(F): 97.5 (2020 05:15), Max: 97.9 (2020 11:56)  HR: 66 (2020 05:15) (63 - 71)  BP: 119/64 (2020 05:15) (96/59 - 119/64)  BP(mean): --  RR: 19 (2020 05:15) (18 - 19)  SpO2: 100% (2020 05:15) (100% - 100%)  I&O's Summary      PHYSICAL EXAM:  General:  NAD  HEENT: PERRLA, EOMI, moist mucous membranes  Neurology: A&Ox3, nonfocal, RIDLEY x 4  Respiratory: CTA B/L, normal respiratory effort, no wheezes, crackles, rales  CV: RRR, S1S2, no murmurs, rubs or gallops  Abdominal: Soft, NT, ND +BS, Last BM  Extremities: No edema, + peripheral pulses  Incisions:   Tubes:    LABS:                        10.0   6.25  )-----------( 202      ( 2020 06:15 )             31.0     Hb Trend: 10.0<--, 10.6<--, 11.8<--  WBC Trend: 6.25<--, 6.09<--, 7.72<--  Plt Trend: 202<--, 260<--, 306<--          04-12    133<L>  |  95<L>  |  62<H>  ----------------------------<  165<H>  4.3   |  27  |  2.25<H>    Ca    9.0      2020 06:01  Phos  1.9     04-12  Mg     1.5     04-12    TPro  5.3<L>  /  Alb  2.8<L>  /  TBili  < 0.2<L>  /  DBili  x   /  AST  14  /  ALT  8   /  AlkPhos  59  04-11        Urinalysis Basic - ( 2020 15:07 )    Color: LIGHT YELLOW / Appearance: CLEAR / S.020 / pH: 5.5  Gluc: 300 / Ketone: NEGATIVE  / Bili: NEGATIVE / Urobili: NORMAL   Blood: NEGATIVE / Protein: 20 / Nitrite: NEGATIVE   Leuk Esterase: NEGATIVE / RBC: 3-5 / WBC 0-2   Sq Epi: OCC / Non Sq Epi: x / Bacteria: NEGATIVE      CAPILLARY BLOOD GLUCOSE      POCT Blood Glucose.: 163 mg/dL (2020 10:09)  POCT Blood Glucose.: 258 mg/dL (2020 22:00)  POCT Blood Glucose.: 202 mg/dL (2020 17:25)  POCT Blood Glucose.: 363 mg/dL (2020 12:43)              RADIOLOGY & ADDITIONAL TESTS:    Imaging Personally Reviewed:  [ ] YES  [ ] NO [ ] No New Imaging Last 24hrs    Consultant(s) Notes Reviewed:  [x ] YES  [ ] NO    Care Discussed with Consultants/Other Providers [ x] YES  [ ] NO Rayna Herrera MD  Resident Physician  Internal Medicine Prelim  PGY-1  Pager: [phone]    After 7PM please page [phone]    Patient is a 89y old  Male who presents with a chief complaint of failure to thrive, poor PO intake (2020 14:22)      INTERVAL HPI/OVERNIGHT EVENTS: No acute events overnight.         aspirin enteric coated 81 milliGRAM(s) Oral daily  dextrose 40% Gel 15 Gram(s) Oral once PRN  dextrose 5%. 1000 milliLiter(s) IV Continuous <Continuous>  dextrose 50% Injectable 12.5 Gram(s) IV Push once  dextrose 50% Injectable 25 Gram(s) IV Push once  dextrose 50% Injectable 25 Gram(s) IV Push once  glucagon  Injectable 1 milliGRAM(s) IntraMuscular once PRN  heparin  Injectable 5000 Unit(s) SubCutaneous every 12 hours  insulin glargine Injectable (LANTUS) 2 Unit(s) SubCutaneous at bedtime  insulin lispro (HumaLOG) corrective regimen sliding scale   SubCutaneous three times a day before meals  insulin lispro (HumaLOG) corrective regimen sliding scale   SubCutaneous at bedtime  insulin lispro Injectable (HumaLOG) 2 Unit(s) SubCutaneous three times a day before meals  isosorbide   mononitrate ER Tablet (IMDUR) 120 milliGRAM(s) Oral daily  metoprolol succinate ER 25 milliGRAM(s) Oral daily  ondansetron Injectable 4 milliGRAM(s) IV Push every 8 hours PRN  pantoprazole    Tablet 40 milliGRAM(s) Oral before breakfast  simvastatin 40 milliGRAM(s) Oral at bedtime  sodium chloride 0.9%. 1000 milliLiter(s) IV Continuous <Continuous>        Vital Signs Last 24 Hrs  T(C): 36.4 (2020 05:15), Max: 36.6 (2020 11:56)  T(F): 97.5 (2020 05:15), Max: 97.9 (2020 11:56)  HR: 66 (2020 05:15) (63 - 71)  BP: 119/64 (2020 05:15) (96/59 - 119/64)  BP(mean): --  RR: 19 (2020 05:15) (18 - 19)  SpO2: 100% (2020 05:15) (100% - 100%)  I&O's Summary      PHYSICAL EXAM:  General:  NAD  HEENT: PERRLA, EOMI, moist mucous membranes  Neurology: A&Ox3, nonfocal, RIDLEY x 4  Respiratory: CTA B/L, normal respiratory effort, no wheezes, crackles, rales  CV: RRR, S1S2, no murmurs, rubs or gallops  Abdominal: Soft, NT, ND +BS, Last BM  Extremities: No edema, + peripheral pulses  Incisions:   Tubes:    LABS:                        10.0   6.25  )-----------( 202      ( 2020 06:15 )             31.0     Hb Trend: 10.0<--, 10.6<--, 11.8<--  WBC Trend: 6.25<--, 6.09<--, 7.72<--  Plt Trend: 202<--, 260<--, 306<--          04-12    133<L>  |  95<L>  |  62<H>  ----------------------------<  165<H>  4.3   |  27  |  2.25<H>    Ca    9.0      2020 06:01  Phos  1.9     04-12  Mg     1.5     04-12    TPro  5.3<L>  /  Alb  2.8<L>  /  TBili  < 0.2<L>  /  DBili  x   /  AST  14  /  ALT  8   /  AlkPhos  59  04-11        Urinalysis Basic - ( 2020 15:07 )    Color: LIGHT YELLOW / Appearance: CLEAR / S.020 / pH: 5.5  Gluc: 300 / Ketone: NEGATIVE  / Bili: NEGATIVE / Urobili: NORMAL   Blood: NEGATIVE / Protein: 20 / Nitrite: NEGATIVE   Leuk Esterase: NEGATIVE / RBC: 3-5 / WBC 0-2   Sq Epi: OCC / Non Sq Epi: x / Bacteria: NEGATIVE      CAPILLARY BLOOD GLUCOSE      POCT Blood Glucose.: 163 mg/dL (2020 10:09)  POCT Blood Glucose.: 258 mg/dL (2020 22:00)  POCT Blood Glucose.: 202 mg/dL (2020 17:25)  POCT Blood Glucose.: 363 mg/dL (2020 12:43)              RADIOLOGY & ADDITIONAL TESTS:    Imaging Personally Reviewed:  [ ] YES  [ ] NO [ ] No New Imaging Last 24hrs    Consultant(s) Notes Reviewed:  [x ] YES  [ ] NO    Care Discussed with Consultants/Other Providers [ x] YES  [ ] NO

## 2020-04-13 NOTE — PROGRESS NOTE ADULT - ATTENDING COMMENTS
pt seen and examined   chart reviewed   discussed with resident  agree with above finding and plan   SW   ACP and establishing DNR/DNI status

## 2020-04-14 NOTE — PROGRESS NOTE ADULT - SUBJECTIVE AND OBJECTIVE BOX
pt seen and examined   chart reviewed   case dicussed w resident   overall much improved   FS uncontrolled : monitor and ajust while on in insulin : will change to prandin upon dc given Cr (  0.5 three times daily )   discussed ACP : Pt is DNR .. pt will sign MOLST  d/w resident    hopeful dc today No pertinent family history in first degree relatives

## 2020-04-14 NOTE — PHYSICAL THERAPY INITIAL EVALUATION ADULT - PERTINENT HX OF CURRENT PROBLEM, REHAB EVAL
This is a 89y M came with chief complaint of nausea and general malaise for the past two days prior to admission. Admitted with LEONCIO.

## 2020-04-14 NOTE — PROGRESS NOTE ADULT - SUBJECTIVE AND OBJECTIVE BOX
Rayna Herrera MD  Resident Physician  Internal Medicine Prelim  PGY-1  Pager: [phone]    After 7PM please page [phone]    Patient is a 89y old  Male who presents with a chief complaint of failure to thrive, poor PO intake (2020 12:08)      INTERVAL HPI/OVERNIGHT EVENTS: No acute events overnight.         aspirin enteric coated 81 milliGRAM(s) Oral daily  dextrose 40% Gel 15 Gram(s) Oral once PRN  dextrose 5%. 1000 milliLiter(s) IV Continuous <Continuous>  dextrose 50% Injectable 12.5 Gram(s) IV Push once  dextrose 50% Injectable 25 Gram(s) IV Push once  dextrose 50% Injectable 25 Gram(s) IV Push once  glucagon  Injectable 1 milliGRAM(s) IntraMuscular once PRN  heparin  Injectable 5000 Unit(s) SubCutaneous every 12 hours  insulin glargine Injectable (LANTUS) 3 Unit(s) SubCutaneous at bedtime  insulin lispro (HumaLOG) corrective regimen sliding scale   SubCutaneous three times a day before meals  insulin lispro (HumaLOG) corrective regimen sliding scale   SubCutaneous at bedtime  insulin lispro Injectable (HumaLOG) 2 Unit(s) SubCutaneous three times a day before meals  isosorbide   mononitrate ER Tablet (IMDUR) 120 milliGRAM(s) Oral daily  metoprolol succinate ER 25 milliGRAM(s) Oral daily  ondansetron Injectable 4 milliGRAM(s) IV Push every 8 hours PRN  pantoprazole    Tablet 40 milliGRAM(s) Oral before breakfast  simvastatin 40 milliGRAM(s) Oral at bedtime  sodium chloride 0.9%. 1000 milliLiter(s) IV Continuous <Continuous>        Vital Signs Last 24 Hrs  T(C): 36.8 (2020 22:01), Max: 36.8 (2020 22:01)  T(F): 98.2 (2020 22:01), Max: 98.2 (2020 22:01)  HR: 65 (2020 04:45) (61 - 65)  BP: 134/71 (2020 04:45) (104/62 - 134/71)  BP(mean): --  RR: 20 (2020 22:01) (17 - 20)  SpO2: 100% (2020 22:01) (100% - 100%)  I&O's Summary      PHYSICAL EXAM:  General:  NAD  HEENT: PERRLA, EOMI, moist mucous membranes  Neurology: A&Ox3, nonfocal, RIDLEY x 4  Respiratory: CTA B/L, normal respiratory effort, no wheezes, crackles, rales  CV: RRR, S1S2, no murmurs, rubs or gallops  Abdominal: Soft, NT, ND +BS, Last BM  Extremities: No edema, + peripheral pulses  Incisions:   Tubes:    LABS:                        10.2   5.19  )-----------( 210      ( 2020 13:38 )             33.0     Hb Trend: 10.2<--, 10.0<--, 10.6<--, 11.8<--  WBC Trend: 5.19<--, 6.25<--, 6.09<--  Plt Trend: 210<--, 202<--, 260<--, 306<--          04-13    137  |  100  |  54<H>  ----------------------------<  351<H>  4.8   |  24  |  1.91<H>    Ca    9.0      2020 13:38  Phos  1.7     04-13  Mg     1.7     04-13          Urinalysis Basic - ( 2020 15:07 )    Color: LIGHT YELLOW / Appearance: CLEAR / S.020 / pH: 5.5  Gluc: 300 / Ketone: NEGATIVE  / Bili: NEGATIVE / Urobili: NORMAL   Blood: NEGATIVE / Protein: 20 / Nitrite: NEGATIVE   Leuk Esterase: NEGATIVE / RBC: 3-5 / WBC 0-2   Sq Epi: OCC / Non Sq Epi: x / Bacteria: NEGATIVE      CAPILLARY BLOOD GLUCOSE  216 (2020 17:49)      POCT Blood Glucose.: 186 mg/dL (2020 21:58)  POCT Blood Glucose.: 216 mg/dL (2020 17:14)  POCT Blood Glucose.: 393 mg/dL (2020 17:12)  POCT Blood Glucose.: 306 mg/dL (2020 12:20)  POCT Blood Glucose.: 163 mg/dL (2020 10:09)              RADIOLOGY & ADDITIONAL TESTS:    Imaging Personally Reviewed:  [ ] YES  [ ] NO [ ] No New Imaging Last 24hrs    Consultant(s) Notes Reviewed:  [x ] YES  [ ] NO    Care Discussed with Consultants/Other Providers [ x] YES  [ ] NO

## 2020-04-14 NOTE — PHYSICAL THERAPY INITIAL EVALUATION ADULT - PATIENT PROFILE REVIEW, REHAB EVAL
yes/activity order- ambulate as tolerated with assistance , OK to perform PT evaluation as per JIM Landry

## 2020-04-14 NOTE — PROGRESS NOTE ADULT - ATTENDING COMMENTS
[facility] NEPHROLOGY  Marcelino Crespo M.D.  Loy Chavez D.O.  Carleen Mercedes M.D.  Stefany De León, MSN, ANP-C    Telephone: [phone]  Facsimile: [phone]    [address] [place] [address]

## 2020-04-14 NOTE — PROGRESS NOTE ADULT - SUBJECTIVE AND OBJECTIVE BOX
[facility] NEPHROLOGY- PROGRESS NOTE    89y Male with history of CHF presents with nausea and poor PO intake. Nephrology consulted for elevated Scr.    REVIEW OF SYSTEMS: No chest pain, dyspnea, nausea, vomiting or diarrhea.     No Known Allergies      Hospital Medications: Medications reviewed      VITALS:  T(F): 98.2 (20 @ 22:01), Max: 98.2 (20 @ 22:01)  HR: 65 (20 @ 04:45)  BP: 134/71 (20 @ 04:45)  RR: 20 (20 @ 22:01)  SpO2: 100% (20 @ 22:01)  Wt(kg): --        PHYSICAL EXAM:    Gen: Lethargic, dry MM  Cards: RRR, +S1/S2, no M/G/R  Resp: CTA B/L  GI: soft, NT/ND, NABS  Vascular: no LE edema B/L      LABS:      137  |  100  |  54<H>  ----------------------------<  351<H>  4.8   |  24  |  1.91<H>    Ca    9.0      2020 13:38  Phos  1.7       Mg     1.7           Creatinine Trend: 1.91 <--, 2.25 <--, 2.30 <--, 2.46 <--, 3.38 <--                        10.2   5.19  )-----------( 210      ( 2020 13:38 )             33.0     Urine Studies:  Urinalysis Basic - ( 2020 15:07 )    Color: LIGHT YELLOW / Appearance: CLEAR / S.020 / pH: 5.5  Gluc: 300 / Ketone: NEGATIVE  / Bili: NEGATIVE / Urobili: NORMAL   Blood: NEGATIVE / Protein: 20 / Nitrite: NEGATIVE   Leuk Esterase: NEGATIVE / RBC: 3-5 / WBC 0-2   Sq Epi: OCC / Non Sq Epi:  / Bacteria: NEGATIVE      Sodium, Random Urine: 20 mmol/L ( @ 15:05)  Creatinine, Random Urine: 78.90 mg/dL ( @ 15:05)

## 2020-04-14 NOTE — PROGRESS NOTE ADULT - ASSESSMENT
89y Male with history of CHF presents with nausea and poor PO intake. Nephrology consulted for elevated Scr.    1) LEONCIO: likely secondary to pre-renal azotemia given poor PO intake on lasix and entresto as an outpatient. UA bland with low FeNa for which patient was given gentle IVF on 4/12 with improvement in renal function. Encourage PO intake. Renal US unremarkable. Avoid nephrotoxins.    2) CKD-3: in setting of HTN/DM with baseline Scr 1.3? UA with proteinuria however defer further CKD work up given advanced age. Monitor electrolytes.    3) HTN with CKD: BP controlled. Continue with current medications and low sodium diet. Holding entresto. Monitor BP.    4) LE edema: Holding diuretics. Monitor UO.    No renal objection to discharge. Would hold entresto on discharge and can resume lasix on 4/17 at 40 mg every MWF with close follow up with patients cardiologist as an outpatient.

## 2020-04-15 NOTE — PROGRESS NOTE ADULT - SUBJECTIVE AND OBJECTIVE BOX
[facility] NEPHROLOGY- PROGRESS NOTE    89y Male with history of CHF presents with nausea and poor PO intake. Nephrology consulted for elevated Scr.    REVIEW OF SYSTEMS: No chest pain, dyspnea, nausea, vomiting or diarrhea.     No Known Allergies      Hospital Medications: Medications reviewed      VITALS:  T(F): 97.8 (04-15-20 @ 06:09), Max: 98.5 (20 @ 22:02)  HR: 66 (04-15-20 @ 06:09)  BP: 115/70 (04-15-20 @ 06:09)  RR: 19 (04-15-20 @ 06:09)  SpO2: 97% (04-15-20 @ 06:09)  Wt(kg): --     @ 07:01  -  04-15 @ 07:00  --------------------------------------------------------  IN: 0 mL / OUT: 300 mL / NET: -300 mL    04-15 @ 07:01  -  04-15 @ 12:43  --------------------------------------------------------  IN: 0 mL / OUT: 300 mL / NET: -300 mL        PHYSICAL EXAM:    Gen: Lethargic, dry MM  Cards: RRR, +S1/S2, no M/G/R  Resp: CTA B/L  GI: soft, NT/ND, NABS  Vascular: no LE edema B/L      LABS:      128<L>  |  98  |  51<H>  ----------------------------<  278<H>  5.1   |  18<L>  |  1.69<H>    Ca    9.5      2020 15:00  Phos  1.8       Mg     1.6           Creatinine Trend: 1.69 <--, 1.76 <--, 1.91 <--, 2.25 <--, 2.30 <--, 2.46 <--, 3.38 <--                        10.4   5.97  )-----------( 220      ( 2020 10:00 )             33.4     Urine Studies:  Urinalysis Basic - ( 2020 15:07 )    Color: LIGHT YELLOW / Appearance: CLEAR / S.020 / pH: 5.5  Gluc: 300 / Ketone: NEGATIVE  / Bili: NEGATIVE / Urobili: NORMAL   Blood: NEGATIVE / Protein: 20 / Nitrite: NEGATIVE   Leuk Esterase: NEGATIVE / RBC: 3-5 / WBC 0-2   Sq Epi: OCC / Non Sq Epi:  / Bacteria: NEGATIVE      Sodium, Random Urine: 20 mmol/L ( @ 15:05)  Creatinine, Random Urine: 78.90 mg/dL ( @ 15:05)

## 2020-04-15 NOTE — PROGRESS NOTE ADULT - ATTENDING COMMENTS
--- Coverage for Dr. Mathias ---   Chart reviewed. Vitals and Labs noted.   Pt seen and examined at bedside. Plan formulated with the resident/PA/NP. Detail H&P as above.     Feels well, comfortable, sitting up, awake alert without any complaints. no cp, no sob, no n/v/d. no abdominal pain. no headache, no dizziness.   Due to difficulty with blood draws, BMP took sometimes today. Repeat Na stable. Encouraged PO intake.    LEONCIO: Cr relatively stable. renal on the case. Holding Entresto for now. Tentative plan to resume Lasix 40 mg low dose MWF if Cr remain stable  Chronic combined systolic and diastolic heart failure, diuretics as above  DM/HTN/CKD3    Pt lives alone, PT recommending rehab, but pt refusing.   CM/SW coordinating with the family for a safe discharge.  DVT ppx    - Dr. JOHNSON Htet ([employer])  - [phone] --- Coverage for Dr. Mathias ---   Chart reviewed. Vitals and Labs noted.   Pt seen and examined at bedside. Plan formulated with the resident/PA/NP. Detail H&P as above.     Feels well, comfortable, sitting up, awake alert without any complaints. no cp, no sob, no n/v/d. no abdominal pain. no headache, no dizziness.   Due to difficulty with blood draws, BMP took sometimes today. Repeat Na stable. Encouraged PO intake.    LEONCIO: Cr relatively stable. renal on the case. Holding Entresto for now. Tentative plan to resume Lasix 40 mg low dose MWF if Cr remain stable  Chronic combined systolic and diastolic heart failure with mod/severe MR, diuretics as above  DM/HTN/CKD3/CAD s/p stents/CABG    DC planning issues:  Pt lives alone, PT recommending rehab, but pt refusing.   CM/SW coordinating with the family for a safe discharge.  DVT ppx    - Dr. JOHNSON Htet ([employer])  - [phone]

## 2020-04-15 NOTE — PROGRESS NOTE ADULT - SUBJECTIVE AND OBJECTIVE BOX
Rayna Herrera MD  Resident Physician  Internal Medicine Prelim  PGY-1  Pager: [phone]    After 7PM please page [phone]    Patient is a 89y old  Male who presents with a chief complaint of failure to thrive, poor PO intake (14 Apr 2020 11:01)      INTERVAL HPI/OVERNIGHT EVENTS: No acute events overnight. Patient seen and examined at bedside this AM. Pt requesting more food for breakfast. Pt eating well. Discussed PT eval adn recommendation for ALVIN, pt declined. He would like to go home.         aspirin enteric coated 81 milliGRAM(s) Oral daily  dextrose 40% Gel 15 Gram(s) Oral once PRN  dextrose 5%. 1000 milliLiter(s) IV Continuous <Continuous>  dextrose 50% Injectable 12.5 Gram(s) IV Push once  dextrose 50% Injectable 25 Gram(s) IV Push once  dextrose 50% Injectable 25 Gram(s) IV Push once  glucagon  Injectable 1 milliGRAM(s) IntraMuscular once PRN  heparin  Injectable 5000 Unit(s) SubCutaneous every 12 hours  insulin glargine Injectable (LANTUS) 6 Unit(s) SubCutaneous at bedtime  insulin lispro (HumaLOG) corrective regimen sliding scale   SubCutaneous three times a day before meals  insulin lispro Injectable (HumaLOG) 4 Unit(s) SubCutaneous three times a day before meals  isosorbide   mononitrate ER Tablet (IMDUR) 120 milliGRAM(s) Oral daily  metoprolol succinate ER 25 milliGRAM(s) Oral daily  ondansetron Injectable 4 milliGRAM(s) IV Push every 8 hours PRN  pantoprazole    Tablet 40 milliGRAM(s) Oral before breakfast  simvastatin 40 milliGRAM(s) Oral at bedtime  sodium phosphate IVPB 15 milliMole(s) IV Intermittent once        Vital Signs Last 24 Hrs  T(C): 36.6 (15 Apr 2020 06:09), Max: 36.9 (14 Apr 2020 22:02)  T(F): 97.8 (15 Apr 2020 06:09), Max: 98.5 (14 Apr 2020 22:02)  HR: 66 (15 Apr 2020 06:09) (64 - 66)  BP: 115/70 (15 Apr 2020 06:09) (103/54 - 115/70)  BP(mean): --  RR: 19 (15 Apr 2020 06:09) (18 - 20)  SpO2: 97% (15 Apr 2020 06:09) (97% - 100%)  I&O's Summary    14 Apr 2020 07:01  -  15 Apr 2020 07:00  --------------------------------------------------------  IN: 0 mL / OUT: 300 mL / NET: -300 mL    15 Apr 2020 07:01  -  15 Apr 2020 10:43  --------------------------------------------------------  IN: 0 mL / OUT: 300 mL / NET: -300 mL        PHYSICAL EXAM:  General:  NAD  HEENT: PERRLA, EOMI, moist mucous membranes  Neurology: A&Ox3, nonfocal, RIDLEY x 4  Respiratory: CTA B/L, normal respiratory effort, no wheezes, crackles, rales  CV: RRR, S1S2, no murmurs, rubs or gallops  Abdominal: Soft, NT, ND +BS, Last BM  Extremities: No edema, + peripheral pulses  Incisions:   Tubes:    LABS:                        10.4   5.97  )-----------( 220      ( 14 Apr 2020 10:00 )             33.4     Hb Trend: 10.4<--, 10.2<--, 10.0<--, 10.6<--, 11.8<--  WBC Trend: 5.97<--, 5.19<--, 6.25<--  Plt Trend: 220<--, 210<--, 202<--, 260<--, 306<--          04-14    128<L>  |  98  |  51<H>  ----------------------------<  278<H>  5.1   |  18<L>  |  1.69<H>    Ca    9.5      14 Apr 2020 15:00  Phos  1.8     04-14  Mg     1.6     04-14            CAPILLARY BLOOD GLUCOSE      POCT Blood Glucose.: 100 mg/dL (15 Apr 2020 08:50)  POCT Blood Glucose.: 216 mg/dL (14 Apr 2020 21:37)  POCT Blood Glucose.: 255 mg/dL (14 Apr 2020 17:29)  POCT Blood Glucose.: 267 mg/dL (14 Apr 2020 14:01)  POCT Blood Glucose.: 403 mg/dL (14 Apr 2020 11:49)              RADIOLOGY & ADDITIONAL TESTS:    Imaging Personally Reviewed:  [ ] YES  [ ] NO [ ] No New Imaging Last 24hrs    Consultant(s) Notes Reviewed:  [x ] YES  [ ] NO    Care Discussed with Consultants/Other Providers [ x] YES  [ ] NO Rayna Herrera MD  Resident Physician  Internal Medicine Prelim  PGY-1  Pager: [phone]    After 7PM please page [phone]    Patient is a 89y old  Male who presents with a chief complaint of failure to thrive, poor PO intake (14 Apr 2020 11:01)      INTERVAL HPI/OVERNIGHT EVENTS: No acute events overnight. Patient seen and examined at bedside this AM. Pt requesting more food for breakfast. Pt eating well. Discussed PT eval and recommendation for ALVIN, pt declined. He would like to go home.         aspirin enteric coated 81 milliGRAM(s) Oral daily  dextrose 40% Gel 15 Gram(s) Oral once PRN  dextrose 5%. 1000 milliLiter(s) IV Continuous <Continuous>  dextrose 50% Injectable 12.5 Gram(s) IV Push once  dextrose 50% Injectable 25 Gram(s) IV Push once  dextrose 50% Injectable 25 Gram(s) IV Push once  glucagon  Injectable 1 milliGRAM(s) IntraMuscular once PRN  heparin  Injectable 5000 Unit(s) SubCutaneous every 12 hours  insulin glargine Injectable (LANTUS) 6 Unit(s) SubCutaneous at bedtime  insulin lispro (HumaLOG) corrective regimen sliding scale   SubCutaneous three times a day before meals  insulin lispro Injectable (HumaLOG) 4 Unit(s) SubCutaneous three times a day before meals  isosorbide   mononitrate ER Tablet (IMDUR) 120 milliGRAM(s) Oral daily  metoprolol succinate ER 25 milliGRAM(s) Oral daily  ondansetron Injectable 4 milliGRAM(s) IV Push every 8 hours PRN  pantoprazole    Tablet 40 milliGRAM(s) Oral before breakfast  simvastatin 40 milliGRAM(s) Oral at bedtime  sodium phosphate IVPB 15 milliMole(s) IV Intermittent once        Vital Signs Last 24 Hrs  T(C): 36.6 (15 Apr 2020 06:09), Max: 36.9 (14 Apr 2020 22:02)  T(F): 97.8 (15 Apr 2020 06:09), Max: 98.5 (14 Apr 2020 22:02)  HR: 66 (15 Apr 2020 06:09) (64 - 66)  BP: 115/70 (15 Apr 2020 06:09) (103/54 - 115/70)  BP(mean): --  RR: 19 (15 Apr 2020 06:09) (18 - 20)  SpO2: 97% (15 Apr 2020 06:09) (97% - 100%)  I&O's Summary    14 Apr 2020 07:01  -  15 Apr 2020 07:00  --------------------------------------------------------  IN: 0 mL / OUT: 300 mL / NET: -300 mL    15 Apr 2020 07:01  -  15 Apr 2020 10:43  --------------------------------------------------------  IN: 0 mL / OUT: 300 mL / NET: -300 mL        PHYSICAL EXAM:  General:  NAD  HEENT: PERRLA, EOMI, moist mucous membranes  Neurology: A&Ox3, nonfocal, RIDLEY x 4  Respiratory: CTA B/L, normal respiratory effort, no wheezes, crackles, rales  CV: RRR, S1S2, no murmurs, rubs or gallops  Abdominal: Soft, NT, ND +BS, Last BM  Extremities: No edema, + peripheral pulses  Incisions:   Tubes:    LABS:                        10.4   5.97  )-----------( 220      ( 14 Apr 2020 10:00 )             33.4     Hb Trend: 10.4<--, 10.2<--, 10.0<--, 10.6<--, 11.8<--  WBC Trend: 5.97<--, 5.19<--, 6.25<--  Plt Trend: 220<--, 210<--, 202<--, 260<--, 306<--          04-14    128<L>  |  98  |  51<H>  ----------------------------<  278<H>  5.1   |  18<L>  |  1.69<H>    Ca    9.5      14 Apr 2020 15:00  Phos  1.8     04-14  Mg     1.6     04-14            CAPILLARY BLOOD GLUCOSE      POCT Blood Glucose.: 100 mg/dL (15 Apr 2020 08:50)  POCT Blood Glucose.: 216 mg/dL (14 Apr 2020 21:37)  POCT Blood Glucose.: 255 mg/dL (14 Apr 2020 17:29)  POCT Blood Glucose.: 267 mg/dL (14 Apr 2020 14:01)  POCT Blood Glucose.: 403 mg/dL (14 Apr 2020 11:49)              RADIOLOGY & ADDITIONAL TESTS:    Imaging Personally Reviewed:  [ ] YES  [ ] NO [ ] No New Imaging Last 24hrs    Consultant(s) Notes Reviewed:  [x ] YES  [ ] NO    Care Discussed with Consultants/Other Providers [ x] YES  [ ] NO

## 2020-04-15 NOTE — PROGRESS NOTE ADULT - ASSESSMENT
89y Male with history of CHF presents with nausea and poor PO intake. Nephrology consulted for elevated Scr.    1) LEONCIO: likely secondary to pre-renal azotemia given poor PO intake on lasix and entresto as an outpatient. UA bland with low FeNa for which patient was given gentle IVF on 4/12 with improvement in renal function. Encourage PO intake. Renal US unremarkable. Avoid nephrotoxins.    2) CKD-3: in setting of HTN/DM with baseline Scr 1.3? UA with proteinuria however defer further CKD work up given advanced age. Monitor electrolytes.    3) HTN with CKD: BP controlled. Continue with current medications. Holding entresto. Monitor BP.    4) LE edema: Holding diuretics. Monitor UO.    5) Hyponatremia: In setting of hemolyzed specimen given low bicaronate likely lab error. Encourage PO intake and continue with Glucerna. Monitor serum sodium.    No renal objection to discharge. Would hold entresto on discharge and can resume lasix on 4/17 at 40 mg every MWF with close follow up with patients cardiologist as an outpatient.

## 2020-04-15 NOTE — PROGRESS NOTE ADULT - ATTENDING COMMENTS
[facility] NEPHROLOGY  Marcelino Crespo M.D.  Loy Chavez D.O.  Carleen Mercedes M.D.  Stefany De León, MSN, ANP-C    Telephone: [phone]  Facsimile: [phone]    [address] [place] [address]

## 2020-04-15 NOTE — PROGRESS NOTE ADULT - PROBLEM SELECTOR PLAN 1
Given his failure to thrive and home furosemide/ARB usage, likely a combination of dehydration/poor PO intake combined with medication side effects causing his LEONCIO in the setting of CKD3 which is 2' his diabetes mellitus and hypertension  Holding lasix and ARB for now i/s/o LEONCIO  Renal U/S w/o acute pathology, hydronephrosis  S/p IVF during night of admission; will start gentle IVF as pt appears dry  Appreciate nephrology consult: Urine studies ordered 4/11 PM after evening CMP showed persistently elevated Cr.   - will discharge patient home following discussion with son regarding safe discharge.

## 2020-04-16 NOTE — BEHAVIORAL HEALTH ASSESSMENT NOTE - NSBHCHARTREVIEWVS_PSY_A_CORE FT
Vital Signs Last 24 Hrs  T(C): 36.7 (15 Apr 2020 22:55), Max: 36.8 (15 Apr 2020 13:01)  T(F): 98.1 (15 Apr 2020 22:55), Max: 98.3 (15 Apr 2020 13:01)  HR: 77 (16 Apr 2020 06:52) (60 - 77)  BP: 105/55 (16 Apr 2020 06:52) (105/55 - 119/50)  BP(mean): --  RR: 14 (15 Apr 2020 22:55) (14 - 18)  SpO2: 99% (15 Apr 2020 22:55) (97% - 99%)

## 2020-04-16 NOTE — BEHAVIORAL HEALTH ASSESSMENT NOTE - SUMMARY
89-year old M, no PPH, PMH of CAD, s/p coronary angioplasty last 2016, not amenable to PCI or CABG, DM2, HTN, admitted for malaise, LEONCIO. Psych c/s for capacity to refuse physical therapy at home/or PT rehab.     Patient irritable on exam but able to be redirected - no psych phenomenon seen, patient appears to be easily upset though mood/affect reactive. Denies SI/I/P.     Patient demonstrates capacity to accept home PT and thus refuse [facility] PT - see HPI for detailed capacity assessment. Patient his accepting of home PT and this is a tenable, reasonable alternative to the treatment proposed (PT).    As the primary reason for patient going to [facility] is for PT and PT requires a patients wilfull and sustained cooperation, evaluation for capacity to refuse [facility]IN may actually be moot, as there would not be a method by which to compel a patient into participating with the intervention. In such a situation the ethical balance of beneficence vs. autonomy would favor the latter. 89-year old M, no PPH, PMH of CAD, s/p coronary angioplasty last 2016, not amenable to PCI or CABG, DM2, HTN, admitted for malaise, LEONCIO. Psych c/s for capacity to refuse physical therapy at home/or PT rehab.     Patient irritable on exam but able to be redirected - no psych phenomenon seen, patient appears to be easily upset though mood/affect reactive. Denies SI/I/P.     Patient demonstrates capacity to accept home PT and thus refuse [facility] PT - see HPI for detailed capacity assessment. Patient his accepting of home PT and this is a tenable, reasonable alternative to the treatment proposed ([facility] PT).    As the primary reason for patient going to [facility] is for PT and PT requires a patients wilfull and sustained cooperation, evaluation for capacity to refuse ALVIN may actually be moot, as there would not be a method by which to compel a patient into participating with the intervention. In such a situation the ethical balance of beneficence vs. autonomy would favor the latter. 89-year old M, no PPH, PMH of CAD, s/p coronary angioplasty last 2016, not amenable to PCI or CABG, DM2, HTN, admitted for malaise, LEONCIO. Psych c/s for capacity to refuse physical therapy at home/or PT rehab.     Patient irritable on exam but able to be redirected - no psych phenomenon seen, patient appears to be easily upset though mood/affect reactive. Denies SI/I/P.     Patient demonstrates capacity to accept home PT and thus refuse [facility] PT - see HPI for detailed capacity assessment. Patient his accepting of home PT and this is a tenable, reasonable alternative to the treatment proposed ([facility] PT).    As the primary reason for patient going to [facility] is for PT and PT requires a patients wilfull and sustained cooperation, evaluation for capacity to refuse ALVIN may actually be moot, as there would not be a method by which to compel a patient into participating with the intervention against his will. In such a situation the ethical balance of beneficence vs. autonomy would clearly favor the latter.

## 2020-04-16 NOTE — BEHAVIORAL HEALTH ASSESSMENT NOTE - THOUGHT ASSOCIATIONS
Telephone Encounter by Rosalina Alberto MD at 10/16/18 07:45 PM     Author:  Rosalina Alberto MD Service:  (none) Author Type:  Physician     Filed:  10/16/18 07:47 PM Encounter Date:  10/16/2018 Status:  Signed     :  Rosalina Alberto MD (Physician)            Urine protein is higher. Most likely diabetes related. Kidney biopsy can be done to ensure no other cause. Dr Nam Ree concerned about abnormal protein causing the urine protein to increase. For that, need kidney biopsy.   Can be done in [place] or can do it here whenever he wants to proceed.[ND1.1M]      Revision History        User Key Date/Time User Provider Type Action    > ND1.1 10/16/18 07:47 PM Rosalina Alberto MD Physician Sign    M - Manual Normal

## 2020-04-16 NOTE — PROGRESS NOTE ADULT - PROBLEM SELECTOR PLAN 3
He is not fluid overloaded  Lasix and ARB on hold 2' LEOCNIO  Cont metoprolol  One liter daily fluid restriction  Monitor daily weights  Monitor strict I/Os  No need for repeat transthoracic echocardiogram He is not fluid overloaded, will resume Lasix at home (MWF)  ARB on hold 2' LEONCIO  Cont metoprolol  One liter daily fluid restriction  Monitor daily weights  Monitor strict I/Os  No need for repeat transthoracic echocardiogram

## 2020-04-16 NOTE — PROGRESS NOTE ADULT - PROBLEM SELECTOR PLAN 5
Continue metoprolol  Lasix and ARB are on hold Continue metoprolol  Lasix will resume at home MWF  Entresto on home

## 2020-04-16 NOTE — PROGRESS NOTE ADULT - SUBJECTIVE AND OBJECTIVE BOX
[facility] NEPHROLOGY- PROGRESS NOTE    89y Male with history of CHF presents with nausea and poor PO intake. Nephrology consulted for elevated Scr.    REVIEW OF SYSTEMS: No chest pain, dyspnea, nausea, vomiting or diarrhea.     No Known Allergies      Hospital Medications: Medications reviewed      VITALS:  T(F): 98.1 (04-15-20 @ 22:55), Max: 98.3 (04-15-20 @ 13:01)  HR: 77 (20 @ 06:52)  BP: 105/55 (20 @ 06:52)  RR: 14 (04-15-20 @ 22:55)  SpO2: 99% (04-15-20 @ 22:55)  Wt(kg): --    04-15 @ 07:01  -   @ 07:00  --------------------------------------------------------  IN: 0 mL / OUT: 700 mL / NET: -700 mL        PHYSICAL EXAM:    Gen: NAD, agitated  Cards: RRR, +S1/S2, no M/G/R  Resp: CTA B/L  GI: soft, NT/ND, NABS  Vascular: no LE edema B/L      LABS:      133<L>  |  99  |  56<H>  ----------------------------<  194<H>  4.4   |  23  |  1.79<H>    Ca    8.6      2020 07:30  Phos  4.3       Mg     1.8           Creatinine Trend: 1.79 <--, 1.94 <--, 1.69 <--, 1.76 <--, 1.91 <--, 2.25 <--, 2.30 <--, 2.46 <--, 3.38 <--                        8.7    5.16  )-----------( 200      ( 2020 07:30 )             28.1     Urine Studies:  Urinalysis Basic - ( 2020 15:07 )    Color: LIGHT YELLOW / Appearance: CLEAR / S.020 / pH: 5.5  Gluc: 300 / Ketone: NEGATIVE  / Bili: NEGATIVE / Urobili: NORMAL   Blood: NEGATIVE / Protein: 20 / Nitrite: NEGATIVE   Leuk Esterase: NEGATIVE / RBC: 3-5 / WBC 0-2   Sq Epi: OCC / Non Sq Epi:  / Bacteria: NEGATIVE      Sodium, Random Urine: 20 mmol/L ( @ 15:05)  Creatinine, Random Urine: 78.90 mg/dL ( @ 15:05)

## 2020-04-16 NOTE — PROGRESS NOTE ADULT - SUBJECTIVE AND OBJECTIVE BOX
Rayna Herrera MD  Resident Physician  Internal Medicine Prelim  PGY-1  Pager: [phone]    After 7PM please page [phone]    Patient is a 89y old  Male who presents with a chief complaint of failure to thrive, poor PO intake (15 Apr 2020 12:43)      INTERVAL HPI/OVERNIGHT EVENTS:        acetaminophen   Tablet .. 650 milliGRAM(s) Oral every 6 hours PRN  aspirin enteric coated 81 milliGRAM(s) Oral daily  dextrose 40% Gel 15 Gram(s) Oral once PRN  dextrose 5%. 1000 milliLiter(s) IV Continuous <Continuous>  dextrose 50% Injectable 12.5 Gram(s) IV Push once  dextrose 50% Injectable 25 Gram(s) IV Push once  dextrose 50% Injectable 25 Gram(s) IV Push once  glucagon  Injectable 1 milliGRAM(s) IntraMuscular once PRN  heparin  Injectable 5000 Unit(s) SubCutaneous every 12 hours  insulin glargine Injectable (LANTUS) 6 Unit(s) SubCutaneous at bedtime  insulin lispro (HumaLOG) corrective regimen sliding scale   SubCutaneous three times a day before meals  insulin lispro Injectable (HumaLOG) 4 Unit(s) SubCutaneous three times a day before meals  isosorbide   mononitrate ER Tablet (IMDUR) 120 milliGRAM(s) Oral daily  metoprolol succinate ER 25 milliGRAM(s) Oral daily  ondansetron Injectable 4 milliGRAM(s) IV Push every 8 hours PRN  pantoprazole    Tablet 40 milliGRAM(s) Oral before breakfast  simvastatin 40 milliGRAM(s) Oral at bedtime        Vital Signs Last 24 Hrs  T(C): 36.7 (15 Apr 2020 22:55), Max: 36.8 (15 Apr 2020 13:01)  T(F): 98.1 (15 Apr 2020 22:55), Max: 98.3 (15 Apr 2020 13:01)  HR: 77 (16 Apr 2020 06:52) (60 - 77)  BP: 105/55 (16 Apr 2020 06:52) (105/55 - 119/50)  BP(mean): --  RR: 14 (15 Apr 2020 22:55) (14 - 18)  SpO2: 99% (15 Apr 2020 22:55) (97% - 99%)  I&O's Summary    15 Apr 2020 07:01  -  16 Apr 2020 07:00  --------------------------------------------------------  IN: 0 mL / OUT: 700 mL / NET: -700 mL        PHYSICAL EXAM:  General:  NAD  HEENT: PERRLA, EOMI, moist mucous membranes  Neurology: A&Ox3, nonfocal, RIDLEY x 4  Respiratory: CTA B/L, normal respiratory effort, no wheezes, crackles, rales  CV: RRR, S1S2, no murmurs, rubs or gallops  Abdominal: Soft, NT, ND +BS, Last BM  Extremities: No edema, + peripheral pulses  Incisions:   Tubes:    LABS:                        10.0   7.31  )-----------( 281      ( 15 Apr 2020 15:15 )             33.2     Hb Trend: 10.0<--, 10.4<--, 10.2<--, 10.0<--, 10.6<--  WBC Trend: 7.31<--, 5.97<--, 5.19<--  Plt Trend: 281<--, 220<--, 210<--, 202<--, 260<--          04-15    132<L>  |  98  |  52<H>  ----------------------------<  124<H>  5.0   |  22  |  1.94<H>    Ca    9.3      15 Apr 2020 15:15  Phos  1.7     04-15  Mg     1.6     04-15            CAPILLARY BLOOD GLUCOSE      POCT Blood Glucose.: 209 mg/dL (15 Apr 2020 22:13)  POCT Blood Glucose.: 135 mg/dL (15 Apr 2020 17:10)  POCT Blood Glucose.: 145 mg/dL (15 Apr 2020 12:00)  POCT Blood Glucose.: 100 mg/dL (15 Apr 2020 08:50)              RADIOLOGY & ADDITIONAL TESTS:    Imaging Personally Reviewed:  [ ] YES  [ ] NO [ ] No New Imaging Last 24hrs    Consultant(s) Notes Reviewed:  [x ] YES  [ ] NO    Care Discussed with Consultants/Other Providers [ x] YES  [ ] NO Rayna Herrera MD  Resident Physician  Internal Medicine Prelim  PGY-1  Pager: [phone]    After 7PM please page [phone]    Patient is a 89y old  Male who presents with a chief complaint of failure to thrive, poor PO intake (15 Apr 2020 12:43)      INTERVAL HPI/OVERNIGHT EVENTS: No acute events overnight. Patient seen and examined at bedside this AM. Pt eating well. Denies CP, SOB, fever, dizziness, n/v.   Discussed safe discharge planning with pt. Pt is adamant about going home. Offered home PT, pt says "he does not need it", Pt is willing to have a home health aide.      acetaminophen   Tablet .. 650 milliGRAM(s) Oral every 6 hours PRN  aspirin enteric coated 81 milliGRAM(s) Oral daily  dextrose 40% Gel 15 Gram(s) Oral once PRN  dextrose 5%. 1000 milliLiter(s) IV Continuous <Continuous>  dextrose 50% Injectable 12.5 Gram(s) IV Push once  dextrose 50% Injectable 25 Gram(s) IV Push once  dextrose 50% Injectable 25 Gram(s) IV Push once  glucagon  Injectable 1 milliGRAM(s) IntraMuscular once PRN  heparin  Injectable 5000 Unit(s) SubCutaneous every 12 hours  insulin glargine Injectable (LANTUS) 6 Unit(s) SubCutaneous at bedtime  insulin lispro (HumaLOG) corrective regimen sliding scale   SubCutaneous three times a day before meals  insulin lispro Injectable (HumaLOG) 4 Unit(s) SubCutaneous three times a day before meals  isosorbide   mononitrate ER Tablet (IMDUR) 120 milliGRAM(s) Oral daily  metoprolol succinate ER 25 milliGRAM(s) Oral daily  ondansetron Injectable 4 milliGRAM(s) IV Push every 8 hours PRN  pantoprazole    Tablet 40 milliGRAM(s) Oral before breakfast  simvastatin 40 milliGRAM(s) Oral at bedtime        Vital Signs Last 24 Hrs  T(C): 36.7 (15 Apr 2020 22:55), Max: 36.8 (15 Apr 2020 13:01)  T(F): 98.1 (15 Apr 2020 22:55), Max: 98.3 (15 Apr 2020 13:01)  HR: 77 (16 Apr 2020 06:52) (60 - 77)  BP: 105/55 (16 Apr 2020 06:52) (105/55 - 119/50)  BP(mean): --  RR: 14 (15 Apr 2020 22:55) (14 - 18)  SpO2: 99% (15 Apr 2020 22:55) (97% - 99%)  I&O's Summary    15 Apr 2020 07:01  -  16 Apr 2020 07:00  --------------------------------------------------------  IN: 0 mL / OUT: 700 mL / NET: -700 mL        PHYSICAL EXAM:  General:  NAD  HEENT:  moist mucous membranes  Neurology: A&Ox3, nonfocal, RIDLEY x 4  Respiratory: CTA B/L, normal respiratory effort, no wheezes, crackles, rales  CV: RRR, S1S2, no murmurs, rubs or gallops  Abdominal: Soft, NT, ND +BS  Extremities: No edema, + peripheral pulses  Incisions:   Tubes:    LABS:                        10.0   7.31  )-----------( 281      ( 15 Apr 2020 15:15 )             33.2     Hb Trend: 10.0<--, 10.4<--, 10.2<--, 10.0<--, 10.6<--  WBC Trend: 7.31<--, 5.97<--, 5.19<--  Plt Trend: 281<--, 220<--, 210<--, 202<--, 260<--          04-15    132<L>  |  98  |  52<H>  ----------------------------<  124<H>  5.0   |  22  |  1.94<H>    Ca    9.3      15 Apr 2020 15:15  Phos  1.7     04-15  Mg     1.6     04-15            CAPILLARY BLOOD GLUCOSE      POCT Blood Glucose.: 209 mg/dL (15 Apr 2020 22:13)  POCT Blood Glucose.: 135 mg/dL (15 Apr 2020 17:10)  POCT Blood Glucose.: 145 mg/dL (15 Apr 2020 12:00)  POCT Blood Glucose.: 100 mg/dL (15 Apr 2020 08:50)              RADIOLOGY & ADDITIONAL TESTS:    Imaging Personally Reviewed:  [ ] YES  [ ] NO [ ] No New Imaging Last 24hrs    Consultant(s) Notes Reviewed:  [x ] YES  [ ] NO    Care Discussed with Consultants/Other Providers [ x] YES  [ ] NO Rayna Herrera MD  Resident Physician  Internal Medicine Prelim  PGY-1  Pager: [phone]    After 7PM please page [phone]    Patient is a 89y old  Male who presents with a chief complaint of failure to thrive, poor PO intake (15 Apr 2020 12:43)      INTERVAL HPI/OVERNIGHT EVENTS: No acute events overnight. Patient seen and examined at bedside this AM. Pt eating well. Denies CP, SOB, fever, dizziness, n/v.   Discussed safe discharge planning with pt. Pt is adamant about going home. Pt agreeable to home PT and or HHA.     acetaminophen   Tablet .. 650 milliGRAM(s) Oral every 6 hours PRN  aspirin enteric coated 81 milliGRAM(s) Oral daily  dextrose 40% Gel 15 Gram(s) Oral once PRN  dextrose 5%. 1000 milliLiter(s) IV Continuous <Continuous>  dextrose 50% Injectable 12.5 Gram(s) IV Push once  dextrose 50% Injectable 25 Gram(s) IV Push once  dextrose 50% Injectable 25 Gram(s) IV Push once  glucagon  Injectable 1 milliGRAM(s) IntraMuscular once PRN  heparin  Injectable 5000 Unit(s) SubCutaneous every 12 hours  insulin glargine Injectable (LANTUS) 6 Unit(s) SubCutaneous at bedtime  insulin lispro (HumaLOG) corrective regimen sliding scale   SubCutaneous three times a day before meals  insulin lispro Injectable (HumaLOG) 4 Unit(s) SubCutaneous three times a day before meals  isosorbide   mononitrate ER Tablet (IMDUR) 120 milliGRAM(s) Oral daily  metoprolol succinate ER 25 milliGRAM(s) Oral daily  ondansetron Injectable 4 milliGRAM(s) IV Push every 8 hours PRN  pantoprazole    Tablet 40 milliGRAM(s) Oral before breakfast  simvastatin 40 milliGRAM(s) Oral at bedtime        Vital Signs Last 24 Hrs  T(C): 36.7 (15 Apr 2020 22:55), Max: 36.8 (15 Apr 2020 13:01)  T(F): 98.1 (15 Apr 2020 22:55), Max: 98.3 (15 Apr 2020 13:01)  HR: 77 (16 Apr 2020 06:52) (60 - 77)  BP: 105/55 (16 Apr 2020 06:52) (105/55 - 119/50)  BP(mean): --  RR: 14 (15 Apr 2020 22:55) (14 - 18)  SpO2: 99% (15 Apr 2020 22:55) (97% - 99%)  I&O's Summary    15 Apr 2020 07:01  -  16 Apr 2020 07:00  --------------------------------------------------------  IN: 0 mL / OUT: 700 mL / NET: -700 mL        PHYSICAL EXAM:  General:  NAD  HEENT:  moist mucous membranes  Neurology: A&Ox3, nonfocal, RIDLEY x 4  Respiratory: CTA B/L, normal respiratory effort, no wheezes, crackles, rales  CV: RRR, S1S2, no murmurs, rubs or gallops  Abdominal: Soft, NT, ND +BS  Extremities: No edema, + peripheral pulses  Incisions:   Tubes:    LABS:                        10.0   7.31  )-----------( 281      ( 15 Apr 2020 15:15 )             33.2     Hb Trend: 10.0<--, 10.4<--, 10.2<--, 10.0<--, 10.6<--  WBC Trend: 7.31<--, 5.97<--, 5.19<--  Plt Trend: 281<--, 220<--, 210<--, 202<--, 260<--          04-15    132<L>  |  98  |  52<H>  ----------------------------<  124<H>  5.0   |  22  |  1.94<H>    Ca    9.3      15 Apr 2020 15:15  Phos  1.7     04-15  Mg     1.6     04-15            CAPILLARY BLOOD GLUCOSE      POCT Blood Glucose.: 209 mg/dL (15 Apr 2020 22:13)  POCT Blood Glucose.: 135 mg/dL (15 Apr 2020 17:10)  POCT Blood Glucose.: 145 mg/dL (15 Apr 2020 12:00)  POCT Blood Glucose.: 100 mg/dL (15 Apr 2020 08:50)              RADIOLOGY & ADDITIONAL TESTS:    Imaging Personally Reviewed:  [ ] YES  [ ] NO [ ] No New Imaging Last 24hrs    Consultant(s) Notes Reviewed:  [x ] YES  [ ] NO    Care Discussed with Consultants/Other Providers [ x] YES  [ ] NO Rayna Herrera MD  Resident Physician  Internal Medicine Prelim  PGY-1  Pager: [phone]    After 7PM please page [phone]    Patient is a 89y old  Male who presents with a chief complaint of failure to thrive, poor PO intake (15 Apr 2020 12:43)      INTERVAL HPI/OVERNIGHT EVENTS: No acute events overnight. Patient seen and examined at bedside this AM. Pt eating well. Denies CP, SOB, fever, dizziness, n/v.   Discussed safe discharge planning with pt. Pt is adamant about going home. Pt agreeable to home PT and/or HHA.     acetaminophen   Tablet .. 650 milliGRAM(s) Oral every 6 hours PRN  aspirin enteric coated 81 milliGRAM(s) Oral daily  dextrose 40% Gel 15 Gram(s) Oral once PRN  dextrose 5%. 1000 milliLiter(s) IV Continuous <Continuous>  dextrose 50% Injectable 12.5 Gram(s) IV Push once  dextrose 50% Injectable 25 Gram(s) IV Push once  dextrose 50% Injectable 25 Gram(s) IV Push once  glucagon  Injectable 1 milliGRAM(s) IntraMuscular once PRN  heparin  Injectable 5000 Unit(s) SubCutaneous every 12 hours  insulin glargine Injectable (LANTUS) 6 Unit(s) SubCutaneous at bedtime  insulin lispro (HumaLOG) corrective regimen sliding scale   SubCutaneous three times a day before meals  insulin lispro Injectable (HumaLOG) 4 Unit(s) SubCutaneous three times a day before meals  isosorbide   mononitrate ER Tablet (IMDUR) 120 milliGRAM(s) Oral daily  metoprolol succinate ER 25 milliGRAM(s) Oral daily  ondansetron Injectable 4 milliGRAM(s) IV Push every 8 hours PRN  pantoprazole    Tablet 40 milliGRAM(s) Oral before breakfast  simvastatin 40 milliGRAM(s) Oral at bedtime        Vital Signs Last 24 Hrs  T(C): 36.7 (15 Apr 2020 22:55), Max: 36.8 (15 Apr 2020 13:01)  T(F): 98.1 (15 Apr 2020 22:55), Max: 98.3 (15 Apr 2020 13:01)  HR: 77 (16 Apr 2020 06:52) (60 - 77)  BP: 105/55 (16 Apr 2020 06:52) (105/55 - 119/50)  BP(mean): --  RR: 14 (15 Apr 2020 22:55) (14 - 18)  SpO2: 99% (15 Apr 2020 22:55) (97% - 99%)  I&O's Summary    15 Apr 2020 07:01  -  16 Apr 2020 07:00  --------------------------------------------------------  IN: 0 mL / OUT: 700 mL / NET: -700 mL        PHYSICAL EXAM:  General:  NAD  HEENT:  moist mucous membranes  Neurology: A&Ox3, nonfocal, RIDLEY x 4  Respiratory: CTA B/L, normal respiratory effort, no wheezes, crackles, rales  CV: RRR, S1S2, no murmurs, rubs or gallops  Abdominal: Soft, NT, ND +BS  Extremities: No edema, + peripheral pulses  Incisions:   Tubes:    LABS:                        10.0   7.31  )-----------( 281      ( 15 Apr 2020 15:15 )             33.2     Hb Trend: 10.0<--, 10.4<--, 10.2<--, 10.0<--, 10.6<--  WBC Trend: 7.31<--, 5.97<--, 5.19<--  Plt Trend: 281<--, 220<--, 210<--, 202<--, 260<--          04-15    132<L>  |  98  |  52<H>  ----------------------------<  124<H>  5.0   |  22  |  1.94<H>    Ca    9.3      15 Apr 2020 15:15  Phos  1.7     04-15  Mg     1.6     04-15            CAPILLARY BLOOD GLUCOSE      POCT Blood Glucose.: 209 mg/dL (15 Apr 2020 22:13)  POCT Blood Glucose.: 135 mg/dL (15 Apr 2020 17:10)  POCT Blood Glucose.: 145 mg/dL (15 Apr 2020 12:00)  POCT Blood Glucose.: 100 mg/dL (15 Apr 2020 08:50)              RADIOLOGY & ADDITIONAL TESTS:    Imaging Personally Reviewed:  [ ] YES  [ ] NO [ ] No New Imaging Last 24hrs    Consultant(s) Notes Reviewed:  [x ] YES  [ ] NO    Care Discussed with Consultants/Other Providers [ x] YES  [ ] NO

## 2020-04-16 NOTE — BEHAVIORAL HEALTH ASSESSMENT NOTE - HPI (INCLUDE ILLNESS QUALITY, SEVERITY, DURATION, TIMING, CONTEXT, MODIFYING FACTORS, ASSOCIATED SIGNS AND SYMPTOMS)
89-year old M, no PPH, PMH of CAD, s/p coronary angioplasty last 2016, not amenable to PCI or CABG, DM2, HTN, admitted for malaise, LEONCIO. Psych c/s for capacity to refuse physical therapy at home/or PT rehab.     Evaluation done via phone with primary MD Dr. Herrera and patient in attendance for reasons of infection risk mitigation during covid19 pandemic. Patient irritable but cooperative on evaluation. Denies mood concerns, denies si/i/p or hi/i/p, AOx3 on exam (gets date slightly wrong). By end of encounter patient states he will be amenable to home PT, does not want rehab PT. Per primary team proximal reason for rehab is PT needs.    Capacity Evaluation: Patient does demonstrate capacity to refuse ALVIN and accept home PT at this time. Please note that capacity is a time and situation limited matter, and that patients can gain or lose capacity based on their clinical condition. The above determination was made using the following considerations:   Choice - clear and consistent choice   Understanding - demonstrates understanding that PT is recommended because of the risk that "I could fall, I could get worse", accepts and notes explanation that deconditioning needs work with PT  Appreciation - has some appreciation of role of PT though states that he has an RN at home   Reasoning - states "I just prefer being home", states that he has RN "24 hours" at home that can help him, accepts that PT at home may be inferior to PT in rehab, states that home environment would be a better place for him to "walk around without falling" as he knows his home layout       As patient accepts an alternative but tenable treatment option that

## 2020-04-16 NOTE — PROGRESS NOTE ADULT - PROBLEM SELECTOR PLAN 1
Given his failure to thrive and home furosemide/ARB usage, likely a combination of dehydration/poor PO intake combined with medication side effects causing his LEONCIO in the setting of CKD3 which is 2' his diabetes mellitus and hypertension  Holding lasix and ARB for now i/s/o LEONCIO  Renal U/S w/o acute pathology, hydronephrosis  S/p IVF during night of admission; will start gentle IVF as pt appears dry  Appreciate nephrology consult: Urine studies ordered 4/11 PM after evening CMP showed persistently elevated Cr.   - will discharge patient home following discussion with son regarding safe discharge. Given his failure to thrive and home furosemide/ARB usage, likely a combination of dehydration/poor PO intake combined with medication side effects causing his LEONCIO in the setting of CKD3 which is 2' his diabetes mellitus and hypertension  -will resume lasix  MWF at home  -will continue to hold entresto  Renal U/S w/o acute pathology, hydronephrosis  Cr 1.79 today

## 2020-04-16 NOTE — PROGRESS NOTE ADULT - REASON FOR ADMISSION
failure to thrive, poor PO intake

## 2020-04-16 NOTE — PROGRESS NOTE ADULT - PROBLEM SELECTOR PLAN 7
Transitions of Care Status:  1.  Name of PCP: German Pineda MD  2.  PCP Contacted on Admission: [ ] Y    [x] N    3.  PCP contacted at Discharge: [x] Y    [ ] N    [ ] N/A  4.  Post-Discharge Appointment Date and Location:  5.  Summary of Handoff given to PCP:  Spoke with Dr. Pineda regarding pt admission and plan for discharge today. Review patient medications. Transitions of Care Status:  1.  Name of PCP: German Pineda MD  2.  PCP Contacted on Admission: [ ] Y    [x] N    3.  PCP contacted at Discharge: [x] Y    [ ] N    [ ] N/A  4.  Post-Discharge Appointment Date and Location:  5.  Summary of Handoff given to PCP:  Spoke with Dr. Pineda regarding pt admission and plan for discharge today. Reviewed patient medications.  Discussed patient admission for FTT and LEONCIO. Patient will be discharged today.

## 2020-04-16 NOTE — PROGRESS NOTE ADULT - ATTENDING COMMENTS
[facility] NEPHROLOGY  Marcelino Crespo M.D.  Loy Chavez D.O.  Carleen Mercedes M.D.  Stefany De León, MSN, ANP-C    Telephone: [phone]  Facsimile: [phone]    [address] [place] [address]

## 2020-04-16 NOTE — PROGRESS NOTE ADULT - PROBLEM SELECTOR PLAN 4
Insulin sliding scale  Fingersticks before each meal and before bedtime  Consistent carbohydrate diet with bedtime snack pt on metformin 1000mg BID at home  -Insulin sliding scale  - Lantus 6u, HUmalog 4u premeal  Fingersticks before each meal and before bedtime  Consistent carbohydrate diet with bedtime snack

## 2020-04-16 NOTE — PROGRESS NOTE ADULT - ASSESSMENT
89y Male with history of CHF presents with nausea and poor PO intake. Nephrology consulted for elevated Scr.    1) LEONCIO: likely secondary to pre-renal azotemia given poor PO intake on lasix and entresto as an outpatient. UA bland with low FeNa for which patient was given gentle IVF on 4/12 with improvement in renal function. Encourage PO intake. Renal US unremarkable. Avoid nephrotoxins.    2) CKD-3: in setting of HTN/DM with baseline Scr 1.3? UA with proteinuria however defer further CKD work up given advanced age. Monitor electrolytes.    3) HTN with CKD: BP low normal. Continue with current medications. Holding entresto. Monitor BP.    4) LE edema: Holding diuretics. Monitor UO.    5) Hyponatremia: Improving. Defer further work up given improving hyponatremia and discharge planning. Encourage PO intake and continue with Glucerna. Monitor serum sodium.

## 2020-04-16 NOTE — DISCHARGE NOTE NURSING/CASE MANAGEMENT/SOCIAL WORK - PATIENT PORTAL LINK FT
You can access the FollowMyHealth Patient Portal offered by [facility] by registering at the following website: [email or web address]. By joining [facility]’s FollowMyHealth portal, you will also be able to view your health information using other applications (apps) compatible with our system.

## 2020-06-04 NOTE — H&P ADULT - NSHPREVIEWOFSYSTEMS_GEN_ALL_CORE
REVIEW OF SYSTEMS:    CONSTITUTIONAL: No weakness, fevers or chills  EYES/ENT: No visual changes;  No dysphagia  NECK: No pain or stiffness  RESPIRATORY: No cough, wheezing, hemoptysis; No shortness of breath  CARDIOVASCULAR: +chest pressure and LE edema b/l  GASTROINTESTINAL: No abdominal or epigastric pain. No nausea, vomiting, or hematemesis; No diarrhea or constipation. No melena or hematochezia.  GENITOURINARY: No dysuria, frequency or hematuria  NEUROLOGICAL: No numbness or weakness  SKIN: No itching, burning, rashes, or lesions

## 2020-06-04 NOTE — CONSULT NOTE ADULT - ASSESSMENT
A/P: 89 year old M pt with PMH of HTN, HLD, DM, known CAD with multi-vessel disease on [facility] in 2016 without lesions amenable for PCI/CABG per report presented from home with chest pressure.    - clinically stable, pt noted mild/persistent chest pressure of 7/10 since this morning  - labs pending at this time; trend trop and serial EKG's  - EKG reviewed, borderline ST changes as noted above but does not meet STEMI criteria  - [facility] cath report from 2016 reviewed, mLAD 60 and 99%, diffuse dLAD lesions, D1 75%, pLCx 75%, pRCA 100%; not amenable for PCI/CABG  - discussed at length with pt regarding pros/cons of intervention  - pt agreeable for medical management at this time while on telemetry floor  - please review/confirm home meds  - medical management with heparin x 48 hours and nitro prn for chest pressure  - will continue to follow, please call with further questions    Haider Yeager, #[phone]  Cardiology Fellow

## 2020-06-04 NOTE — H&P ADULT - PROBLEM SELECTOR PLAN 1
Pt with intermittent chest pressure 7/10 with elevated trops to 100s, repeat pending. EKG with borderline ST changes but not STEMI criteria  -Pt with known 3V disease that is likely not amenable for PCI/CABG per cards  -will continue heparin gtt, and start asa, statin, beta blocker, home imdur  -will give morphine prn pain  -pt appears more hypervolemic, will give one dose of IV lasix and monitor i/o  -recent echo done in 2/2020 with severe global LV dysfunction  -monitor on telemetry.  -will need proper medrec in AM Pt with intermittent chest pressure 7/10 with elevated trops to 100s, now with improved chest pain per patient.   -repeat troponin pending. EKG with borderline ST changes but not STEMI criteria  -Pt with known 3V disease that is likely not amenable for PCI/CABG per cards  -will continue heparin gtt, and start asa, statin, beta blocker, home imdur  -will give morphine prn pain  -pt appears more hypervolemic, will give one dose of IV lasix and monitor i/o  -recent echo done in 2/2020 with severe global LV dysfunction  -monitor on telemetry.  -will need proper medrec in AM

## 2020-06-04 NOTE — ED ADULT TRIAGE NOTE - CHIEF COMPLAINT QUOTE
Patient complains of midsternal chest pain/pressure with shortness of breath since 6am today.  Denies fevers/chills/cough, sick contacts.  Hx CAD w/ stents, HTN, DM.  Given 162mg ASA by EMS.

## 2020-06-04 NOTE — H&P ADULT - BIRTH SEX
Chief Complaint   Patient presents with    Allergy Injection     VORB - Verbal Order Read Back from Dr. Elizabeth Hendricks, to give patient her allergy injections, pt tolerated well. No pain, no chest pain, no shortness of breath, small dot of blood and little redness, and no swelling at injection sites.
Male

## 2020-06-04 NOTE — H&P ADULT - PROBLEM SELECTOR PLAN 2
has known ischemic cardiomyopathy. Last echo done 2/2020, last medrec shows pt taking lasix every other day.   -proBNP over 30k. b/l pitting edema and pleural effusion on CXR  -will give one dose IV lasix and monitor i/o and electrolytes  -continue beta blocker and treatment as above.   -confirm medrec in AM, reportedly on entresto? has known ischemic cardiomyopathy. Last echo done 2/2020, last medrec shows pt taking lasix every other day.   -proBNP over 30k. b/l pitting edema and pleural effusion on CXR  -will give one dose IV lasix and monitor i/o and electrolytes  -continue beta blocker and treatment as above.   -confirm medrec in AM, reportedly on entresto?  -Should also interrogate PPM in AM.

## 2020-06-04 NOTE — ED PROVIDER NOTE - CLINICAL SUMMARY MEDICAL DECISION MAKING FREE TEXT BOX
acute change in EKg with AVR elevation and diffuse STD possibly consistent with left main disease; cath called; fellow to see pt; ASA, nitro ordered

## 2020-06-04 NOTE — H&P ADULT - NSICDXPASTSURGICALHX_GEN_ALL_CORE_FT
PAST SURGICAL HISTORY:  H/O coronary angioplasty last 2016, not amenable to PCI or CABG    History of permanent cardiac pacemaker placement

## 2020-06-04 NOTE — ED ADULT NURSE NOTE - CHPI ED NUR SYMPTOMS NEG
no fever/no nausea/no chills/no congestion/no syncope/no vomiting/no dizziness/no diaphoresis/no back pain

## 2020-06-04 NOTE — ED ADULT NURSE NOTE - NS PRO AD ANY ON CHART
PF DAILY TREATMENT NOTE 3-16    Patient Name: Dhaval Meehan  Date:2017  : 1963  [x]  Patient  Verified  Payor: Clemente Hidalgo / Plan: Akilah Talmage / Product Type: HMO /    In time:1105  Out time:1155  Total Treatment Time (min): 50  Total Timed Codes (min): 50  1:1 Treatment Time ( [address] only):     Visit #: 2 of 8    Treatment Area: [] Pelvic Floor     [] Other:    SUBJECTIVE  Pain Level (0-10 scale): 0/10  Any medication changes, allergies to medications, adverse drug reactions, diagnosis change, or new procedure performed?: [x] No    [] Yes (see summary sheet for update)  Subjective functional status/changes:   [] No changes reported  Pt reports that she completed her bowel logs and feels that she has made some progress, straining intermittently to help have a bowel movement.   Pt also reports that she thinks her bladder frequency is high    OBJECTIVE               20 min Therapeutic Activity:  [x]  See flow sheet  Constipation management:    []  Increase Tissue extensibility        [x]  Assess fiber intake    [x]  Assess voiding habits  [x]  Assess bowel habits  [x]  Other:constipation management strategies,colon massage,  nutrition and review bowel logs   Rationale: improve coordination and increase proprioception  to improve the patients ability to improve bowel emptying      30 min Neuromuscular Re-education:  [x]  See flow sheet :   []  Pelvic floor strengthening                 [x]  Pelvic floor downtraining  []  Quality pelvic floor contractions       [x]  Relaxation techniques  []  Urge suppression exercises  [x]  Other:guided breathing, restorative yoga to promote improved proprioception  Rationale: improve coordination and increase proprioception  to improve the patients ability to improve self awareness of pelvic floor muscles and improve bowel emptying            With   [] TE   [] TA   [x] neuro  [] manual   [] other: Patient Education: [x] Review HEP    [] Progressed/Changed HEP based on:   [x] positioning   [x] body mechanics   [] transfers   [] heat/ice application    [x] other: constipation management and relaxation techniques     Other Objective/Functional Measures:       Pain Level (0-10 scale) post treatment: 0/10    ASSESSMENT/Changes in Function: Pt demonstrates difficulty with self awareness following guided breathing. Therapist noted improved diaphragmatic breathing and relaxed demeanor following breathing and stretching however pt noted little change. Pt will benefit in future visits from biofeedback to improve proprioceptive awareness of pelvic floor muscles and downtraining. []  Decrease # of leaks   [] No change []  Improving [] Resolved     []  Decrease hypertonus [] No change []  Improving [] Resolved     []  Increase void interval [] No change []  Improving [] Resolved     []  Increase PF strength [] No change []  Improving [] Resolved     []  Increase PF endurance [] No change []  Improving [] Resolved     []  Increase endurance [] No change []  Improving [] Resolved     []  Decrease # of pads [] No change []  Improving [] Resolved     []  Decrease pain [] No change []  Improving [] Resolved     []  Increased coordination [] No change []  Improving [] Resolved     [x]  Increased Bowel Frequency [] No change [x]  Improving [] Resolved       Patient will continue to benefit from skilled PT services to address functional mobility deficits, address ROM deficits, address strength deficits, analyze and address soft tissue restrictions, analyze and cue movement patterns and analyze and modify body mechanics/ergonomics to attain remaining goals. []  See Plan of Care  []  See progress note/recertification  []  See Discharge Summary         Progress towards goals / Updated goals:  Short Term Goals: To be accomplished in 4 weeks:  1.  Patient will demonstrate home exercise program accurately as adjunct to PT clinic visits to promote healthy lifestyle and increase quality of life.  Current: Pr reports compliance   2. Patient will verbalize proper voiding mechanics, as well complete bowel logs  Current: Pt returned bowel logs and verbalizes proper voiding mechanics, progressing  3. Patient will perform biofeedback for further assessment and retraining of PF for ease of voiding   Current: Pt educated on biofeedback and will be completed at next visit.     Long Term Goals: To be accomplished in 8 weeks:  1. Patient will have FOTO score of 59 points indicating improvement in function(49 at eval)  2. Patient will demonstrate proper PF activation as tested rectally to allow coordinated activation and hold with full relaxation to allow awareness required for proper voiding  3. Patient will report 75% decrease in symptoms to allow improved voiding without strain or pressure     PLAN  []  Upgrade activities as tolerated     [x]  Continue plan of care  []  Update interventions per flow sheet       []  Discharge due to:_  []  Other:_      Saumya Mathews, ANNA 5/2/2017  11:45 AM    No future appointments. no, pt states that his son has the paperwork but verbally stated that he does not wish to be intubated or resuscitated

## 2020-06-04 NOTE — H&P ADULT - NSHPLABSRESULTS_GEN_ALL_CORE
06-04    141  |  105  |  28<H>  ----------------------------<  144<H>  6.3<HH>   |  20<L>  |  1.50<H>    Ca    9.0      04 Jun 2020 18:54    TPro  5.6<L>  /  Alb  3.1<L>  /  TBili  < 0.2<L>  /  DBili  x   /  AST  35  /  ALT  9   /  AlkPhos  75  06-04                            8.8    9.05  )-----------( 427      ( 04 Jun 2020 18:54 )             28.5             LIVER FUNCTIONS - ( 04 Jun 2020 18:54 )  Alb: 3.1 g/dL / Pro: 5.6 g/dL / ALK PHOS: 75 u/L / ALT: 9 u/L / AST: 35 u/L / GGT: x             PT/INR - ( 04 Jun 2020 18:54 )   PT: 12.5 SEC;   INR: 1.09          PTT - ( 04 Jun 2020 18:54 )  PTT:42.8 SEC    CXR: INTERPRETATION:  Small to moderate bilateral pleural effusions, new since 4/9/2020.  PPM seen      EKG: sinus tachycardia with 1st deg AV block, nonspecific intraventricular block and mild ST Depressions lateral leads

## 2020-06-04 NOTE — ED PROVIDER NOTE - CONSTITUTIONAL APPEARANCE HYGIENE, MLM
ILL APPEARING

## 2020-06-04 NOTE — H&P ADULT - HISTORY OF PRESENT ILLNESS
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 different positions were not able to relieve it. He took an 81mg asa this Am and is still on beta blocker reportedly. He manages his medications alone. No shortness of breath and no fever, chills, cough, abdominal pain or dysuria. In the ED pt was given asa, pain control and heparinized with code STEMI called initially, but called off as EKG did not meet STEMI criteria. Decision made to treat medically.

## 2020-06-04 NOTE — CHART NOTE - NSCHARTNOTEFT_GEN_A_CORE
Registration Time: 18:14  Initial EC:27  Called by ED: 18:42  Saw patient at bedside: 18:44  Called Cath Attending: N/A  Balloon Time: N/A    Patient seen and evaluated at bedside    Chief Complaint: chest pressure    HPI: 89 year old M pt with HTN, HLD, DM, and known CAD with multi-vessel disease that was not amenable for PCI/CABG in 2016 p/w chest pressure since 6AM. Currently having mild, persistent chest pressure of 7/10.      PMHx:   Essential hypertension  DM (diabetes mellitus)  CAD (coronary artery disease)      PSHx:   H/O coronary angioplasty  No significant past surgical history      Home Meds: needs to be reviewed/confirmed by primary service    Current Meds:   heparin   Injectable 4100 Unit(s) IV Push once  heparin   Injectable 4100 Unit(s) IV Push every 6 hours PRN  heparin  Infusion.  Unit(s)/Hr IV Continuous <Continuous>  nitroglycerin     SubLingual 0.4 milliGRAM(s) SubLingual every 5 minutes PRN      Allergies:  No Known Allergies      FAMILY HISTORY:  Family history of coronary artery disease  Family history of diabetes mellitus      Social History:  Smoking History:  Alcohol Use:  Drug Use:    REVIEW OF SYSTEMS:  CONSTITUTIONAL: No weakness, fevers or chills  EYES/ENT: No visual changes;  No dysphagia  NECK: No pain or stiffness  RESPIRATORY: No cough, wheezing, hemoptysis; No shortness of breath  CARDIOVASCULAR: + chest pressure, no palpitations; No lower extremity edema  GASTROINTESTINAL: No abdominal or epigastric pain. No nausea, vomiting, or hematemesis; No diarrhea or constipation. No melena or hematochezia.  BACK: No back pain  GENITOURINARY: No dysuria, frequency or hematuria  NEUROLOGICAL: No numbness or weakness  SKIN: No itching, burning, rashes, or lesions   All other review of systems is negative unless indicated above.    Physical Exam:  T(F): 97.3 (-), Max: 97.3 (-)  HR: 114 (-) (104 - 114)  BP: 159/92 (-) (147/93 - 159/92)  RR: 23 (-)  SpO2: 98% (-)  GENERAL: No acute distress, well-developed  HEAD:  Atraumatic, Normocephalic  ENT: EOMI, PERRLA, conjunctiva and sclera clear, Neck supple, No JVD, moist mucosa  CHEST/LUNG: Clear to auscultation bilaterally; No wheeze, equal breath sounds bilaterally   BACK: No spinal tenderness  HEART: Regular rate and rhythm; No murmurs, rubs, or gallops  ABDOMEN: Soft, Nontender, Nondistended; Bowel sounds present  EXTREMITIES:  No clubbing, bilateral 1+ pitting edema  PSYCH: Nl behavior, nl affect  NEUROLOGY: AAOx3, non-focal, cranial nerves intact  SKIN: Normal color, No rashes or lesions  LINES:    Cardiovascular Diagnostic Testing:    ECG: Personally reviewed    Echo:    Stress Testing:    Cath:    Imaging:    Labs: Personally reviewed                        8.8    9.05  )-----------( 427      ( 2020 18:54 )             28.5           PT/INR - ( 2020 18:54 )   PT: 12.5 SEC;   INR: 1.09          PTT - ( 2020 18:54 )  PTT:42.8 SEC    A/P: 89 year old M pt with HTN, HLD, DM, and CAD called for STEMI.    - pt clinically stable with mild, persistent chest pain  - EKG reviewed, does not meet STEMI criteria  - previous LHC reviewed, multi-vessel disease not amenable for PCI/CABG  - discussed with pt regarding pros/cons of angiogram  - agreed for medical management with chest pressure control  - cardiology service to follow, please call with further questions    Haider Yeager, #[phone]  Cardiology Fellow

## 2020-06-04 NOTE — H&P ADULT - PROBLEM SELECTOR PLAN 6
heparin gtt, diabetic/renal diet spoke with pt's son/grandson and pt reporteldy has depression and is likely causing him to be noncompliant with his medications. On prior medrec reporting escitalopram. Will get a psych consult in AM as well as social work consult. Pt living alone and may need additional help

## 2020-06-04 NOTE — ED ADULT NURSE NOTE - CAS TRG GENERAL AIRWAY, MLM
Notified patient of current results from left lung wedge resections.  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  There is no therapy necessary to treat these.    In the 2nd specimen, the mass was not cancerous, but it does contain growth of mold.  Culture results are pending, and when we know which type of mold growth there is, a medical treatment will be recommended.    Patient states that she feels quite well and is back to her normal activities.  She has no significant shortness of breath.  Her oxygen saturation is 99%, and she is drawing 2 L on her incentive spirometer.   Patent

## 2020-06-04 NOTE — CONSULT NOTE ADULT - ATTENDING COMMENTS
ACS, but likely a small MI given lack of CK elevation. Known CAD. Medical therapy with a goal of symptom reduction. No plans for invasive therapy.

## 2020-06-04 NOTE — ED ADULT NURSE NOTE - NSIMPLEMENTINTERV_GEN_ALL_ED
Implemented All Fall with Harm Risk Interventions:  [place] to call system. Call bell, personal items and telephone within reach. Instruct patient to call for assistance. Room bathroom lighting operational. Non-slip footwear when patient is off stretcher. Physically safe environment: no spills, clutter or unnecessary equipment. Stretcher in lowest position, wheels locked, appropriate side rails in place. Provide visual cue, wrist band, yellow gown, etc. Monitor gait and stability. Monitor for mental status changes and reorient to person, place, and time. Review medications for side effects contributing to fall risk. Reinforce activity limits and safety measures with patient and family. Provide visual clues: red socks.

## 2020-06-04 NOTE — H&P ADULT - NSICDXPASTMEDICALHX_GEN_ALL_CORE_FT
PAST MEDICAL HISTORY:  CAD (coronary artery disease) triple vessel disease    Congestive heart failure     DM (diabetes mellitus)     Essential hypertension     History of bradycardia

## 2020-06-04 NOTE — H&P ADULT - ASSESSMENT
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

## 2020-06-04 NOTE — ED ADULT NURSE NOTE - OBJECTIVE STATEMENT
89 y/o male presents to ED with c/o chest pain.  Pt states that he awoke at 6am with midsternal chest pain.  Pt states that it feels like pressure, pt states that the pain is constant since this morning, pt endorses SOB mostly with exertion, sometimes at rest.  Pt denies dizziness, lightheadedness, denies hematuria, no bloods in stool.  Pt awake alert in NAD, speaking full sentences, in NAD, resps even and unlabored, +BLE edema, non pitting.  IV established, labs drawn and sent, pt placed on CM>, cardiology fellow at bedside.

## 2020-06-04 NOTE — CONSULT NOTE ADULT - SUBJECTIVE AND OBJECTIVE BOX
Patient seen and evaluated @   Reason for consult:     HPI: 89 year old M pt with PMH of HTN, HLD, DM, known CAD with multi-vessel disease on [facility] in 2016 without lesions amenable for PCI/CABG per report presented from home with chest pressure this morning around 6AM. Pt lives by himself and denied any travels and sick contacts. He woke up with the chest pressure without any exertion and noted the discomfort lingered throughout the day with severity of about 7/10. Pt manages his own medications and endorsed taking them, although he does not know what meds he's on. ROS was other unremarkable. He noted recent PPM for a fall for which he was not able to provide much details. He sees an outside cardiologist and had a recent visit approx 2 weeks prior and was told that there were no active concerns at the time of the visit.    Primary Service HPI:    PMH:   Essential hypertension  DM (diabetes mellitus)  CAD (coronary artery disease)    PSH:   H/O coronary angioplasty  No significant past surgical history    Medications:   heparin   Injectable 4100 Unit(s) IV Push once  heparin   Injectable 4100 Unit(s) IV Push every 6 hours PRN  heparin  Infusion.  Unit(s)/Hr IV Continuous <Continuous>  nitroglycerin     SubLingual 0.4 milliGRAM(s) SubLingual every 5 minutes PRN    Allergies:  No Known Allergies    FAMILY HISTORY:  Family history of coronary artery disease  Family history of diabetes mellitus    Social History:  Smoking:  Alcohol:  Drugs:    Review of Systems:  Constitutional: [ ] Fever [ ] Chills [ ] Fatigue [ ] Weight change   HEENT: [ ] Blurred vision [ ] Eye Pain [ ] Headache [ ] Runny nose [ ] Sore Throat   Respiratory: [ ] Cough [ ] Wheezing [ ] Shortness of breath  Cardiovascular: [x] Chest Pressure but no pain [ ] Palpitations [ ] BILLINGS [ ] PND [ ] Orthopnea  Gastrointestinal: [ ] Abdominal Pain [ ] Diarrhea [ ] Constipation [ ] Hemorrhoids [ ] Nausea [ ] Vomiting  Genitourinary: [ ] Nocturia [ ] Dysuria [ ] Incontinence  Extremities: [ ] Swelling [ ] Joint Pain  Neurologic: [ ] Focal deficit [ ] Paresthesias [ ] Syncope  Lymphatic: [ ] Swelling [ ] Lymphadenopathy   Skin: [ ] Rash [ ] Ecchymoses [ ] Wounds [ ] Lesions  Psychiatry: [ ] Depression [ ] Suicidal/Homicidal Ideation [ ] Anxiety [ ] Sleep Disturbances  [ ] 10 point review of systems is otherwise negative except as mentioned above            [ ]Unable to obtain    Physical Exam:  T(C): 36.3 (06-04-20 @ 18:15), Max: 36.3 (06-04-20 @ 18:15)  HR: 114 (06-04-20 @ 18:44) (104 - 114)  BP: 159/92 (06-04-20 @ 18:44) (147/93 - 159/92)  RR: 23 (06-04-20 @ 18:44) (18 - 23)  SpO2: 98% (06-04-20 @ 18:44) (97% - 98%)  Wt(kg): --    Daily     Daily     Appearance: NAD  Eyes: PERRL, EOMI  HENT: Normal oral mucosa, NC/AT  Cardiovascular: normal S1 and S2, RRR, no m/r/g, bilateral 1+ edema  Procedural Access Site:  No hematoma, non-tender to palpation, 2+ pulses distally, no bruit, no ecchymosis  Respiratory: Clear to auscultation bilaterally, no wheezing, no crackles  Gastrointestinal: Soft, non-tender, non-distended, BS+  Musculoskeletal: No clubbing, no joint deformity   Neurologic: Non-focal  Lymphatic: No lymphadenopathy  Psychiatry: AAOx3, mood & affect appropriate  Skin: No rashes, no ecchymoses, no cyanosis    Cardiovascular Diagnostic Testing:  ECG: borderline 1mm ST elevation in aVR with diffuse ST depression noted    Echo:    Stress Testing:    Cath:    Interpretation of Telemetry:    Imaging:    Labs:

## 2020-06-04 NOTE — H&P ADULT - NSHPPHYSICALEXAM_GEN_ALL_CORE
PHYSICAL EXAM:  GENERAL: appearing in minimal distress, laying in bed  HEAD:  Atraumatic, Normocephalic  EYES: EOMI, PERRLA, conjunctiva and sclera clear  NECK: Supple, No JVD  CHEST/LUNG: minimal rales at the bases, no wheeze  HEART: Regular rate and rhythm; No murmurs, rubs, or gallops, (+)S1, S2  ABDOMEN: Soft, Nontender, Nondistended; Normal Bowel sounds   EXTREMITIES:  2+ pitting edema b/l to knees  PSYCH: normal mood and affect  NEUROLOGY: AAOx3, non-focal moving all extremities  SKIN: No rashes or lesions

## 2020-06-04 NOTE — ED PROVIDER NOTE - OBJECTIVE STATEMENT
"89-year old gentleman with a past medical history of triple-vessel coronary artery disease, history of coronary angioplasty last 2016, not amenable to PCI or CABG, DM2, HTN," PPM presents with CP since waking up at 6 AM this morning.  States intermittent in nature but becoming more constant.  Denies SOB, palpitations, vomiting.

## 2020-06-05 NOTE — PROVIDER CONTACT NOTE (CRITICAL VALUE NOTIFICATION) - ACTION/TREATMENT ORDERED:
Will follow ACS heparin nomogram as per MAR. Heparin drip stopped for 1hr. will continue to monitor. safety maintained.

## 2020-06-05 NOTE — PROVIDER CONTACT NOTE (OTHER) - ASSESSMENT
VS stable, room air, on heparin drip at 2ml/hr. pt just finished eating 4oz of chicken. pain and pressure started right after eating. paced rhythm on tele.

## 2020-06-05 NOTE — ED ADULT NURSE REASSESSMENT NOTE - NSIMPLEMENTINTERV_GEN_ALL_ED
Implemented All Fall with Harm Risk Interventions:  [place] to call system. Call bell, personal items and telephone within reach. Instruct patient to call for assistance. Room bathroom lighting operational. Non-slip footwear when patient is off stretcher. Physically safe environment: no spills, clutter or unnecessary equipment. Stretcher in lowest position, wheels locked, appropriate side rails in place. Provide visual cue, wrist band, yellow gown, etc. Monitor gait and stability. Monitor for mental status changes and reorient to person, place, and time. Review medications for side effects contributing to fall risk. Reinforce activity limits and safety measures with patient and family. Provide visual clues: red socks.

## 2020-06-05 NOTE — PROGRESS NOTE ADULT - ASSESSMENT
89-year old gentleman with a past medical history of chronic systolic congestive heart failure, triple-vessel coronary artery disease, history of coronary angioplasty last 2016, not amenable to PCI or CABG, DM2, HTN, CKD3, [facility]   now admitted with CP    · NSTEMI :   cont heparinization   cont medical management for CAD .. nt candidate for aggressie treatment      Chronic systolic CHF (congestive heart failure).  Plan:  restart o/p. diuretics   overall comfortable on NC       ·  Problem: DM (diabetes mellitus).  Plan: Insulin sliding scale    ·  Problem: Essential hypertension.  Plan: monitor   cont meds       Will re instate DNR   pt had MOLST form  last admission

## 2020-06-05 NOTE — ED ADULT NURSE REASSESSMENT NOTE - NS ED NURSE REASSESS COMMENT FT1
ACP contacted regarding repeat trop 144 increased from previous and continue chest pressure unchanged after 2mg morphine IV push. pt on heparin gtt 6ml/hr, next coags due in ~20min. will continue to monitor pt, no additional intervention required at this time.
Note time11:30am.     Coags returned with aptt of 93.  Heparin drip stopped and repeat coags drawn and sent to lab (as per algorithm).  Patient alert and oriented x 3 and has no complaints.  NP at bedside.   MOLST form completed at this time and placed in chart.
lab called to notify of ptt >200. heparin gtt stopped for 1 hour per ACS nomogram. MD Escamilla paged.
no response from MD Escamilla after 2x paged. ACP [id number] called for new heparin gtt order with new rate.
pt reports c/p unrelieved with sublingual nitro, MAR made aware, EKG in progress, further orders noted.
Received patient from evening RN, resting on bed, alert and oriented x 3. Patient denies chest pain at this time.  Patient noted to be on heparin drip at 600 units/hr and Mag sulfate drip at 100cc/hr.  Lights adjusted at patients request. Physician at bedside.
handoff report received from RN Emerita, JIM Antonio. pt A&Ox2 (disoriented to date, knows its 2020) here for chest pains. currently on heparin gtt @ 8ml/hr, next ptt due at 0100. HS POC FS completed. pt appears comfortable and in no distress. 1st degree heart block on tele monitor. repsirations even and nonlabored on room air. will continue to monitor.

## 2020-06-05 NOTE — PROGRESS NOTE ADULT - SUBJECTIVE AND OBJECTIVE BOX
Patient is a 89y old  Male who presents with a chief complaint of Chest pain, ACS (04 Jun 2020 23:02)                                                               INTERVAL HPI/OVERNIGHT EVENTS:    REVIEW OF SYSTEMS:     CONSTITUTIONAL: No weakness, fevers or chills  RESPIRATORY: No cough, wheezing,  No shortness of breath  CARDIOVASCULAR: No chest pain or palpitations  GASTROINTESTINAL: No abdominal pain  . No nausea, vomiting, or hematemesis; No diarrhea or constipation. No melena or hematochezia.  GENITOURINARY: No dysuria, frequency or hematuria  NEUROLOGICAL: No numbness or weakness                                                                                                                                                                                                                                                                                Medications:  MEDICATIONS  (STANDING):  aspirin  chewable 81 milliGRAM(s) Oral daily  dextrose 5%. 1000 milliLiter(s) (50 mL/Hr) IV Continuous <Continuous>  dextrose 50% Injectable 12.5 Gram(s) IV Push once  dextrose 50% Injectable 25 Gram(s) IV Push once  dextrose 50% Injectable 25 Gram(s) IV Push once  heparin  Infusion. 600 Unit(s)/Hr (6 mL/Hr) IV Continuous <Continuous>  insulin lispro (HumaLOG) corrective regimen sliding scale   SubCutaneous three times a day before meals  insulin lispro (HumaLOG) corrective regimen sliding scale   SubCutaneous at bedtime  isosorbide   mononitrate ER Tablet (IMDUR) 120 milliGRAM(s) Oral daily  metoprolol tartrate 12.5 milliGRAM(s) Oral every 6 hours  pantoprazole    Tablet 40 milliGRAM(s) Oral before breakfast  simvastatin 40 milliGRAM(s) Oral at bedtime    MEDICATIONS  (PRN):  dextrose 40% Gel 15 Gram(s) Oral once PRN Blood Glucose LESS THAN 70 milliGRAM(s)/deciliter  glucagon  Injectable 1 milliGRAM(s) IntraMuscular once PRN Glucose LESS THAN 70 milligrams/deciliter  heparin   Injectable 4100 Unit(s) IV Push every 6 hours PRN For aPTT less than 40  morphine  - Injectable 2 milliGRAM(s) IV Push every 6 hours PRN Severe Pain (7 - 10)       Allergies    No Known Allergies    Intolerances      Vital Signs Last 24 Hrs  T(C): 36.7 (05 Jun 2020 08:20), Max: 36.7 (05 Jun 2020 08:20)  T(F): 98 (05 Jun 2020 08:20), Max: 98 (05 Jun 2020 08:20)  HR: 74 (05 Jun 2020 08:20) (72 - 114)  BP: 140/71 (05 Jun 2020 08:20) (115/65 - 159/92)  BP(mean): --  RR: 14 (05 Jun 2020 08:20) (14 - 23)  SpO2: 99% (05 Jun 2020 08:20) (97% - 100%)  CAPILLARY BLOOD GLUCOSE      POCT Blood Glucose.: 171 mg/dL (05 Jun 2020 08:37)  POCT Blood Glucose.: 185 mg/dL (05 Jun 2020 00:14)      06-04 @ 07:01  -  06-05 @ 07:00  --------------------------------------------------------  IN: 0 mL / OUT: 600 mL / NET: -600 mL      Physical Exam:    Daily     Daily   General: elderly NAD   HEENT:  Nonicteric, PERRLA  CV:  RRR, S1S2   Lungs: decreaesed BS at bases   Abdomen:  Soft, non-tender, no distended, positive BS  Extremities: trace edema   :  No samaniego  Neuro:  AAOx3, non-focal, grossly intact                                                                                                                                                                                                                                                                                                LABS:                               8.2    7.75  )-----------( 352      ( 05 Jun 2020 05:15 )             26.2                      06-05    135  |  99  |  29<H>  ----------------------------<  183<H>  4.3   |  21<L>  |  1.50<H>    Ca    8.6      05 Jun 2020 05:15  Mg     1.5     06-05    TPro  5.5<L>  /  Alb  3.1<L>  /  TBili  0.2  /  DBili  < 0.2  /  AST  27  /  ALT  8   /  AlkPhos  74  06-05                       RADIOLOGY & ADDITIONAL TESTS         I personally reviewed: [  ]EKG   [  ]CXR    [  ] CT      A/P:         Discussed with :     Racheal consultants' Notes   Time spent :

## 2020-06-06 NOTE — PROGRESS NOTE ADULT - ASSESSMENT
A/P : (INCOMPLETE)     89 year old man with history of CAD and prior cath in 2016 revealing severe three vessel disease with poor vessel targets for bypass and deemed also a poor candidate for PCI being managed medically, heart failure with severely reduced EF and severe MR, severe pulm HTN, now presents with chest pain, and troponin elevation, concerning for possible NSTEMI     #NSTEMI  - stop heparin gtt after 48 hours if not experiencing chest pain   - continue ASA 81 mg daily     # HFrEF  - continue lasix 40 mg PO daily  - conitinue Imdur 120 mg daily   - continue Simvastatin 40 mg daily   - would benefit from ACEi or ARB , but with LEONCIO at present.     Kirk Biggs MD   Cardiology fellow A/P :     89 year old man with history of CAD and prior cath in 2016 revealing severe three vessel disease with poor vessel targets for bypass and deemed also a poor candidate for PCI being managed medically, heart failure with severely reduced EF and severe MR, severe pulm HTN, now presents with chest pain, and troponin elevation, concerning for possible NSTEMI. Pt still having occasional chest pressure     #NSTEMI  - repeat a 12 lead ECG today.   - continue heparin gtt for now  - continue ASA 81 mg daily   - will discuss if patient should be on 2nd antiplatelet agent like Plavix.   - continue Metoprolol     # HFrEF  - continue lasix 40 mg PO daily  - conitinue Imdur 120 mg daily   - continue Simvastatin 40 mg daily   - would benefit from ACEi or ARB , but with LEONCIO at present.     Kirk Biggs MD   Cardiology fellow A/P :     89 year old man with history of CAD and prior cath in 2016 revealing severe three vessel disease with poor vessel targets for bypass and deemed also a poor candidate for PCI being managed medically, heart failure with severely reduced EF and severe MR, severe pulm HTN, now presents with chest pain, and troponin elevation, concerning for possible NSTEMI. Pt still having occasional chest pressure     #NSTEMI/ CAD   - repeat a 12 lead ECG today.   - continue heparin gtt for now  - continue ASA 81 mg daily   - will discuss if patient should be on 2nd antiplatelet agent like Plavix.   - will discuss if patient should be anti anginals like ranolazine, pt already on nitrates and beta blockers.   - continue Metoprolol     # HFrEF  - continue lasix 40 mg PO daily  - conitinue Imdur 120 mg daily   - continue Simvastatin 40 mg daily   - would benefit from ACEi or ARB , but with LEONCIO at present.     Kirk Biggs MD   Cardiology fellow

## 2020-06-06 NOTE — PROVIDER CONTACT NOTE (OTHER) - ASSESSMENT
Patient c/o midsternal pressure like chest pain after he ate breakfast. Patient denies palpitations & shortness of breath.

## 2020-06-06 NOTE — PROGRESS NOTE ADULT - SUBJECTIVE AND OBJECTIVE BOX
Patient is a 89y old  Male who presents with a chief complaint of Chest pain, ACS (06 Jun 2020 06:31)  asked to resume care of this patient who is followed in my office by Dr. Gonzalez; I have cared for him previously.     INTERVAL HPI/OVERNIGHT EVENTS: had chest pain last nite, this occured while eating     MEDICATIONS  (STANDING):  aspirin  chewable 81 milliGRAM(s) Oral daily  dextrose 5%. 1000 milliLiter(s) (50 mL/Hr) IV Continuous <Continuous>  dextrose 50% Injectable 12.5 Gram(s) IV Push once  dextrose 50% Injectable 25 Gram(s) IV Push once  dextrose 50% Injectable 25 Gram(s) IV Push once  escitalopram 5 milliGRAM(s) Oral daily  furosemide    Tablet 40 milliGRAM(s) Oral daily  heparin  Infusion. 600 Unit(s)/Hr (6 mL/Hr) IV Continuous <Continuous>  insulin lispro (HumaLOG) corrective regimen sliding scale   SubCutaneous three times a day before meals  insulin lispro (HumaLOG) corrective regimen sliding scale   SubCutaneous at bedtime  isosorbide   mononitrate ER Tablet (IMDUR) 120 milliGRAM(s) Oral daily  metoprolol tartrate 12.5 milliGRAM(s) Oral every 6 hours  pantoprazole    Tablet 40 milliGRAM(s) Oral before breakfast  simvastatin 40 milliGRAM(s) Oral at bedtime    MEDICATIONS  (PRN):  dextrose 40% Gel 15 Gram(s) Oral once PRN Blood Glucose LESS THAN 70 milliGRAM(s)/deciliter  glucagon  Injectable 1 milliGRAM(s) IntraMuscular once PRN Glucose LESS THAN 70 milligrams/deciliter  heparin   Injectable 4100 Unit(s) IV Push every 6 hours PRN For aPTT less than 40  morphine  - Injectable 2 milliGRAM(s) IV Push every 6 hours PRN Severe Pain (7 - 10)          Allergies    No Known Allergies    Intolerances        REVIEW OF SYSTEMS:       RESPIRATORY: No cough, wheezing, chills or hemoptysis; No shortness of breath    GASTROINTESTINAL: No abdominal or epigastric pain. No nausea, vomiting, or hematemesis; No diarrhea or constipation. No melena or hematochezia.    NEUROLOGICAL: No headaches, memory loss, loss of strength, numbness      PHYSICAL EXAM:  Vital Signs Last 24 Hrs  T(C): 36.4 (06 Jun 2020 03:57), Max: 36.8 (05 Jun 2020 12:00)  T(F): 97.6 (06 Jun 2020 03:57), Max: 98.2 (05 Jun 2020 12:00)  HR: 76 (06 Jun 2020 06:06) (71 - 88)  BP: 126/69 (06 Jun 2020 06:06) (119/76 - 140/71)  BP(mean): --  RR: 17 (06 Jun 2020 03:57) (14 - 19)  SpO2: 100% (06 Jun 2020 03:57) (96% - 100%)    GENERAL: NAD, well-groomed, well-developed  HEAD:  Atraumatic, Normocephalic  EYES: EOMI, PERRLA, conjunctiva and sclera clear  NECK: Supple, No JVD, Normal thyroid  NERVOUS SYSTEM:  Alert & Oriented X3, Good concentration;  and symmetric  CHEST/LUNG: Clear to auscultation bilaterally; No rales, rhonchi, wheezing, or rubs  HEART: fixed split S1S2 regular, without murmur, rub nor gallop  ABDOMEN: Soft, Nontender, Nondistended; Bowel sounds present  EXTREMITIES:  2+ Peripheral Pulses, No clubbing, cyanosis, or edema      LABS:                        9.2    9.02  )-----------( 380      ( 05 Jun 2020 12:00 )             29.7       Ca    8.6        05 Jun 2020 05:15      PT/INR - ( 05 Jun 2020 07:10 )   PT: 13.1 SEC;   INR: 1.13          PTT - ( 06 Jun 2020 01:12 )  PTT:47.2 SEC  CAPILLARY BLOOD GLUCOSE      POCT Blood Glucose.: 139 mg/dL (05 Jun 2020 23:00)  POCT Blood Glucose.: 171 mg/dL (05 Jun 2020 17:59)  POCT Blood Glucose.: 184 mg/dL (05 Jun 2020 12:35)  POCT Blood Glucose.: 171 mg/dL (05 Jun 2020 08:37)      TELEMETRY:    RADIOLOGY & ADDITIONAL TESTS:    Imaging Personally Reviewed:  [ ] YES    Assessment and Plan:   · Assessment		  89-year old gentleman with a past medical history of chronic systolic congestive heart failure, triple-vessel coronary artery disease, history of coronary angioplasty last 2016, not amenable to PCI or CABG, DM2, HTN, CKD3,  now admitted with CP    · NSTEMI : ?  cont heparinization   cont medical management for CAD .. nt candidate for aggressie treatment   can change Metoprolol to 25 bid.  Unclear if last night's chest was GI or Cardiac.   Ambulate patient, observe for exertional chest pain.      Chronic systolic CHF (congestive heart failure).  Plan:  restart o/p. diuretics   overall comfortable on NC       ·  Problem: DM (diabetes mellitus).  Plan: Insulin sliding scale    ·  Problem: Essential hypertension.  Plan: monitor   cont meds See above.      OOB to chair, advance activity as tolerated.       pt had MOLST form  last admission       Attending Attestation:   40 minutes spent on total encounter; more than 50% of the visit was spent counseling and/or coordinating care by the attending physician.

## 2020-06-06 NOTE — PROGRESS NOTE ADULT - SUBJECTIVE AND OBJECTIVE BOX
INCOMPLETE NOTE     Patient seen and examined at bedside.    Overnight Events:     Review Of Systems: No chest pain, shortness of breath, or palpitations            Medications:  aspirin  chewable 81 milliGRAM(s) Oral daily  dextrose 40% Gel 15 Gram(s) Oral once PRN  dextrose 5%. 1000 milliLiter(s) IV Continuous <Continuous>  dextrose 50% Injectable 12.5 Gram(s) IV Push once  dextrose 50% Injectable 25 Gram(s) IV Push once  dextrose 50% Injectable 25 Gram(s) IV Push once  escitalopram 5 milliGRAM(s) Oral daily  furosemide    Tablet 40 milliGRAM(s) Oral daily  glucagon  Injectable 1 milliGRAM(s) IntraMuscular once PRN  heparin   Injectable 4100 Unit(s) IV Push every 6 hours PRN  heparin  Infusion. 600 Unit(s)/Hr IV Continuous <Continuous>  insulin lispro (HumaLOG) corrective regimen sliding scale   SubCutaneous three times a day before meals  insulin lispro (HumaLOG) corrective regimen sliding scale   SubCutaneous at bedtime  isosorbide   mononitrate ER Tablet (IMDUR) 120 milliGRAM(s) Oral daily  metoprolol tartrate 12.5 milliGRAM(s) Oral every 6 hours  morphine  - Injectable 2 milliGRAM(s) IV Push every 6 hours PRN  pantoprazole    Tablet 40 milliGRAM(s) Oral before breakfast  simvastatin 40 milliGRAM(s) Oral at bedtime      PAST MEDICAL & SURGICAL HISTORY:  History of bradycardia  Congestive heart failure  Essential hypertension  DM (diabetes mellitus)  CAD (coronary artery disease): triple vessel disease  History of permanent cardiac pacemaker placement  H/O coronary angioplasty: last 2016, not amenable to PCI or CABG      Vitals:  T(F): 97.6 (06-06), Max: 98.2 (06-05)  HR: 76 (06-06) (71 - 88)  BP: 126/69 (06-06) (119/76 - 140/71)  RR: 17 (06-06)  SpO2: 100% (06-06  )  I&O's Summary    04 Jun 2020 07:01  -  05 Jun 2020 07:00  --------------------------------------------------------  IN: 0 mL / OUT: 600 mL / NET: -600 mL    05 Jun 2020 07:01  -  06 Jun 2020 06:32  --------------------------------------------------------  IN: 141 mL / OUT: 300 mL / NET: -159 mL        Physical Exam:  Appearance: No acute distress; well appearing  Eyes: PERRL, EOMI, pink conjunctiva  HENT: Normal oral muscosa  Cardiovascular: RRR, S1, S2, no murmurs, rubs, or gallops; no edema; no JVD  Respiratory: Clear to auscultation bilaterally  Gastrointestinal: soft, non-tender, non-distended with normal bowel sounds  Musculoskeletal: No clubbing; no joint deformity   Neurologic: Non-focal  Lymphatic: No lymphadenopathy  Psychiatry: AAOx3, mood & affect appropriate  Skin: No rashes, ecchymoses, or cyanosis                          9.2    9.02  )-----------( 380      ( 05 Jun 2020 12:00 )             29.7     06-05    135  |  99  |  29<H>  ----------------------------<  183<H>  4.3   |  21<L>  |  1.50<H>    Ca    8.6      05 Jun 2020 05:15  Mg     1.5     06-05    TPro  5.5<L>  /  Alb  3.1<L>  /  TBili  0.2  /  DBili  < 0.2  /  AST  27  /  ALT  8   /  AlkPhos  74  06-05    PT/INR - ( 05 Jun 2020 07:10 )   PT: 13.1 SEC;   INR: 1.13          PTT - ( 06 Jun 2020 01:12 )  PTT:47.2 SEC  CARDIAC MARKERS ( 05 Jun 2020 15:00 )  x     / x     / x     / 74 u/L / 6.11 ng/mL / x      CARDIAC MARKERS ( 05 Jun 2020 05:15 )  x     / x     / x     / 89 u/L / 4.79 ng/mL / x      CARDIAC MARKERS ( 04 Jun 2020 22:05 )  x     / x     / x     / 48 u/L / 3.09 ng/mL / x          Serum Pro-Brain Natriuretic Peptide: [id number] pg/mL (06-04 @ 18:54)      Echo:< from: Transthoracic Echocardiogram (02.19.20 @ 12:19) >  CONCLUSIONS:  1. Tethered mitral valve leaflets with normal opening.  Moderate-severe mitral regurgitation.  2. Calcified trileaflet aortic valve with normal opening.  Mild aortic regurgitation.  3. Severe global left ventricular systolic dysfunction.  4. Right ventricular enlargement with decreased right  ventricular systolic function.  5. Normal tricuspid valve. Moderate tricuspid  regurgitation.  6. Estimated pulmonary artery systolic pressure equals 50  mm Hg, assuming right atrial pressure equals 10  mm Hg,  consistent with moderate pulmonary hypertension.  ------------------------------------------------------------------------  Confirmed on  2/19/2020 - 13:12:47 by Jada Burns MD    < end of copied text >      Stress Testing:   none recent     Cath:  < from: Cardiac Cath Lab - Adult (11.17.16 @ 13:49) >  LM:   --  LM: Normal.  LAD:   --  Mid LAD: There was a 60 % stenosis. In a second lesion, there  was a 99 % stenosis. The lesion was eccentric andmoderately calcified.  --  Distal LAD: There was a diffuse 50 % stenosis. The lesion was  calcified.  --  D1: There was a 75 % stenosis in the middle third of the vessel  segment. There was a small vascular territory distal to the lesion.  CX:   --  Proximal circumflex: There was a 75 % stenosis. The lesion was  complex.  RCA:   --  Proximal RCA: There was a 100 % stenosis. There was poor  collateral blood supply to the distal myocardium.  COMPLICATIONS: No complications occurred during the cath lab visit.  DIAGNOSTIC IMPRESSIONS: Patient has severe diffsue coronary arterey disea  involving all three coronaries, no ideal for intervention or CABG.  DIAGNOSTIC RECOMMENDATIONS: Aggressive medical therapy and risk factor  modification.    < end of copied text >      Imaging:    Interpretation of Telemetry: Patient seen and examined at bedside.    Overnight Events: complained of chest pain this morning while eating resolved now       Medications:  aspirin  chewable 81 milliGRAM(s) Oral daily  dextrose 40% Gel 15 Gram(s) Oral once PRN  dextrose 5%. 1000 milliLiter(s) IV Continuous <Continuous>  dextrose 50% Injectable 12.5 Gram(s) IV Push once  dextrose 50% Injectable 25 Gram(s) IV Push once  dextrose 50% Injectable 25 Gram(s) IV Push once  escitalopram 5 milliGRAM(s) Oral daily  furosemide    Tablet 40 milliGRAM(s) Oral daily  glucagon  Injectable 1 milliGRAM(s) IntraMuscular once PRN  heparin   Injectable 4100 Unit(s) IV Push every 6 hours PRN  heparin  Infusion. 600 Unit(s)/Hr IV Continuous <Continuous>  insulin lispro (HumaLOG) corrective regimen sliding scale   SubCutaneous three times a day before meals  insulin lispro (HumaLOG) corrective regimen sliding scale   SubCutaneous at bedtime  isosorbide   mononitrate ER Tablet (IMDUR) 120 milliGRAM(s) Oral daily  metoprolol tartrate 12.5 milliGRAM(s) Oral every 6 hours  morphine  - Injectable 2 milliGRAM(s) IV Push every 6 hours PRN  pantoprazole    Tablet 40 milliGRAM(s) Oral before breakfast  simvastatin 40 milliGRAM(s) Oral at bedtime      PAST MEDICAL & SURGICAL HISTORY:  History of bradycardia  Congestive heart failure  Essential hypertension  DM (diabetes mellitus)  CAD (coronary artery disease): triple vessel disease  History of permanent cardiac pacemaker placement  H/O coronary angioplasty: last 2016, not amenable to PCI or CABG      Vitals:  T(F): 97.6 (06-06), Max: 98.2 (06-05)  HR: 76 (06-06) (71 - 88)  BP: 126/69 (06-06) (119/76 - 140/71)  RR: 17 (06-06)  SpO2: 100% (06-06  )  I&O's Summary    04 Jun 2020 07:01  -  05 Jun 2020 07:00  --------------------------------------------------------  IN: 0 mL / OUT: 600 mL / NET: -600 mL    05 Jun 2020 07:01  -  06 Jun 2020 06:32  --------------------------------------------------------  IN: 141 mL / OUT: 300 mL / NET: -159 mL        Physical Exam:  Appearance: No acute distress; well appearing  Eyes: PERRL, EOMI, pink conjunctiva  HENT: Normal oral muscosa  Cardiovascular: RRR, S1, S2, no murmurs, rubs, or gallops; no edema; no JVD  Respiratory: Clear to auscultation bilaterally  Gastrointestinal: soft, non-tender, non-distended with normal bowel sounds  Musculoskeletal: No clubbing; no joint deformity   Neurologic: Non-focal  Lymphatic: No lymphadenopathy  Psychiatry: AAOx3, mood & affect appropriate  Skin: No rashes, ecchymoses, or cyanosis                          9.2    9.02  )-----------( 380      ( 05 Jun 2020 12:00 )             29.7     06-05    135  |  99  |  29<H>  ----------------------------<  183<H>  4.3   |  21<L>  |  1.50<H>    Ca    8.6      05 Jun 2020 05:15  Mg     1.5     06-05    TPro  5.5<L>  /  Alb  3.1<L>  /  TBili  0.2  /  DBili  < 0.2  /  AST  27  /  ALT  8   /  AlkPhos  74  06-05    PT/INR - ( 05 Jun 2020 07:10 )   PT: 13.1 SEC;   INR: 1.13          PTT - ( 06 Jun 2020 01:12 )  PTT:47.2 SEC  CARDIAC MARKERS ( 05 Jun 2020 15:00 )  x     / x     / x     / 74 u/L / 6.11 ng/mL / x      CARDIAC MARKERS ( 05 Jun 2020 05:15 )  x     / x     / x     / 89 u/L / 4.79 ng/mL / x      CARDIAC MARKERS ( 04 Jun 2020 22:05 )  x     / x     / x     / 48 u/L / 3.09 ng/mL / x          Serum Pro-Brain Natriuretic Peptide: [id number] pg/mL (06-04 @ 18:54)      Echo:< from: Transthoracic Echocardiogram (02.19.20 @ 12:19) >  CONCLUSIONS:  1. Tethered mitral valve leaflets with normal opening.  Moderate-severe mitral regurgitation.  2. Calcified trileaflet aortic valve with normal opening.  Mild aortic regurgitation.  3. Severe global left ventricular systolic dysfunction.  4. Right ventricular enlargement with decreased right  ventricular systolic function.  5. Normal tricuspid valve. Moderate tricuspid  regurgitation.  6. Estimated pulmonary artery systolic pressure equals 50  mm Hg, assuming right atrial pressure equals 10  mm Hg,  consistent with moderate pulmonary hypertension.  ------------------------------------------------------------------------  Confirmed on  2/19/2020 - 13:12:47 by Jada Burns MD    < end of copied text >      Stress Testing:   none recent     Cath:  < from: Cardiac Cath Lab - Adult (11.17.16 @ 13:49) >  LM:   --  LM: Normal.  LAD:   --  Mid LAD: There was a 60 % stenosis. In a second lesion, there  was a 99 % stenosis. The lesion was eccentric andmoderately calcified.  --  Distal LAD: There was a diffuse 50 % stenosis. The lesion was  calcified.  --  D1: There was a 75 % stenosis in the middle third of the vessel  segment. There was a small vascular territory distal to the lesion.  CX:   --  Proximal circumflex: There was a 75 % stenosis. The lesion was  complex.  RCA:   --  Proximal RCA: There was a 100 % stenosis. There was poor  collateral blood supply to the distal myocardium.  COMPLICATIONS: No complications occurred during the cath lab visit.  DIAGNOSTIC IMPRESSIONS: Patient has severe diffsue coronary arterey disea  involving all three coronaries, no ideal for intervention or CABG.  DIAGNOSTIC RECOMMENDATIONS: Aggressive medical therapy and risk factor  modification.    < end of copied text >      Imaging:    Interpretation of Telemetry:

## 2020-06-07 NOTE — PROGRESS NOTE ADULT - ASSESSMENT
A/P :     89 year old man with history of CAD and prior cath in 2016 revealing severe three vessel disease with poor vessel targets for bypass and deemed also a poor candidate for PCI being managed medically, heart failure with severely reduced EF and severe MR, severe pulm HTN, now presents with chest pain, and troponin elevation, concerning for possible NSTEMI.     #NSTEMI/ CAD   - can stop heparin gtt for now  - continue ASA 81 mg daily   - will discuss if patient should be anti anginals like ranolazine, pt already on nitrates and beta blockers.   - continue Metoprolol     # HFrEF  - continue lasix 40 mg PO daily  - conitinue Imdur 120 mg daily   - continue Simvastatin 40 mg daily   - would benefit from ACEi or ARB , but with LEONCIO at present.     Kirk Biggs MD   Cardiology fellow

## 2020-06-07 NOTE — PROGRESS NOTE ADULT - ATTENDING COMMENTS
no further chest pain  stop hep gtt  cont medical management as above.
reports his last episode of chest pain was with eating. ?GI component/reflux  OOB without CP  hep gtt - given advanced age would aim for lower target and consider discontinuation after 48h. otherwise continue medical management

## 2020-06-07 NOTE — PHYSICAL THERAPY INITIAL EVALUATION ADULT - ADDITIONAL COMMENTS
patient reports he has rolling walker and straight cane. He uses straight cane indoors, but admits he almost never uses it

## 2020-06-07 NOTE — PHYSICAL THERAPY INITIAL EVALUATION ADULT - CRITERIA FOR SKILLED THERAPEUTIC INTERVENTIONS
rehab potential/functional limitations in following categories/risk reduction/prevention/impairments found/therapy frequency/anticipated discharge recommendation/predicted duration of therapy intervention

## 2020-06-07 NOTE — PROVIDER CONTACT NOTE (OTHER) - ACTION/TREATMENT ORDERED:
provider will start pt on Isordil and OK to give nitroglycerin. will continue to monitor.
12 lead ekg done, morphine IV push PRN given. will continue to monitor. safety maintained.
Administer nitro reevaluate pain and VS 5 minutes after. Administer second dose of nitro for unrelieved pain, retake VS 5 minutes after. EKG. Continue to monitor
Will continue to monitor.

## 2020-06-07 NOTE — CHART NOTE - NSCHARTNOTEFT_GEN_A_CORE
89 year old male on heparin gtt w/hx of NSTEMI complains of chest pain 8/10 substernal described as pressure. States this pain is similar to the pain he had when he came to the hospital. Denies SOB, no N/V, no other complaints. At this time BP was 121/70; EKG is NSR w/1st degree HB. No changes from previous EKG from yesterday.     PE  Vital Signs Last 24 Hrs  T(C): 36.6 (07 Jun 2020 01:30), Max: 36.6 (07 Jun 2020 01:30)  T(F): 97.8 (07 Jun 2020 01:30), Max: 97.8 (07 Jun 2020 01:30)  HR: 70 (07 Jun 2020 03:49) (60 - 76)  BP: 116/69 (07 Jun 2020 03:49) (104/64 - 126/69)  BP(mean): --  RR: 18 (07 Jun 2020 01:30) (18 - 18)  SpO2: 100% (07 Jun 2020 01:30) (97% - 100%)    Lungs Clear to auscultation bilaterally  Cardiac: S1 S2  Abdomen: soft nontender. Does have inguinal hernia  legs: +1 pedal edema    A/PChest Pain 2/2 NSTEMI  Give 1 ntg sublingually w/some relief. Pain goes to 5/10  Give 2nd NTG sublingually; pt states pain has disappeared. BP now 116/69    Will continue to follow   Pt now reports he feels completely fine

## 2020-06-07 NOTE — PROGRESS NOTE ADULT - SUBJECTIVE AND OBJECTIVE BOX
Patient seen and examined at bedside.    Overnight Events:       Medications:  MEDICATIONS  (STANDING):  aspirin  chewable 81 milliGRAM(s) Oral daily  dextrose 5%. 1000 milliLiter(s) (50 mL/Hr) IV Continuous <Continuous>  dextrose 50% Injectable 12.5 Gram(s) IV Push once  dextrose 50% Injectable 25 Gram(s) IV Push once  dextrose 50% Injectable 25 Gram(s) IV Push once  escitalopram 5 milliGRAM(s) Oral daily  furosemide    Tablet 40 milliGRAM(s) Oral daily  heparin  Infusion. 600 Unit(s)/Hr (6 mL/Hr) IV Continuous <Continuous>  insulin lispro (HumaLOG) corrective regimen sliding scale   SubCutaneous three times a day before meals  insulin lispro (HumaLOG) corrective regimen sliding scale   SubCutaneous at bedtime  isosorbide   mononitrate ER Tablet (IMDUR) 120 milliGRAM(s) Oral daily  metoprolol tartrate 25 milliGRAM(s) Oral two times a day  pantoprazole    Tablet 40 milliGRAM(s) Oral before breakfast  simvastatin 40 milliGRAM(s) Oral at bedtime    PAST MEDICAL & SURGICAL HISTORY:  History of bradycardia  Congestive heart failure  Essential hypertension  DM (diabetes mellitus)  CAD (coronary artery disease): triple vessel disease  History of permanent cardiac pacemaker placement  H/O coronary angioplasty: last 2016, not amenable to PCI or CABG      Vitals:  Vital Signs Last 24 Hrs  T(C): 36.3 (07 Jun 2020 05:16), Max: 36.6 (07 Jun 2020 01:30)  T(F): 97.4 (07 Jun 2020 05:16), Max: 97.8 (07 Jun 2020 01:30)  HR: 75 (07 Jun 2020 05:16) (60 - 75)  BP: 130/70 (07 Jun 2020 05:16) (104/64 - 130/70)  BP(mean): --  RR: 18 (07 Jun 2020 05:16) (18 - 18)  SpO2: 100% (07 Jun 2020 05:16) (97% - 100%)  I  05 Jun 2020 07:01  -  06 Jun 2020 07:00  --------------------------------------------------------  IN:    heparin  Infusion.: 46 mL    Oral Fluid: 140 mL  Total IN: 186 mL    OUT:    Voided: 450 mL  Total OUT: 450 mL    Total NET: -264 mL      06 Jun 2020 07:01  -  07 Jun 2020 06:59  --------------------------------------------------------  IN:    heparin  Infusion.: 58 mL    Oral Fluid: 550 mL  Total IN: 608 mL    OUT:    Voided: 500 mL  Total OUT: 500 mL    Total NET: 108 mL    Physical Exam:  Appearance: No acute distress; well appearing  Eyes: PERRL, EOMI, pink conjunctiva  HENT: Normal oral muscosa  Cardiovascular: RRR, S1, S2, no murmurs, rubs, or gallops; no edema; no JVD  Respiratory: Clear to auscultation bilaterally  Gastrointestinal: soft, non-tender, non-distended with normal bowel sounds  Musculoskeletal: No clubbing; no joint deformity   Neurologic: Non-focal  Lymphatic: No lymphadenopathy  Psychiatry: AAOx3, mood & affect appropriate  Skin: No rashes, ecchymoses, or cyanosis                                     8.5    7.29  )-----------( 362      ( 06 Jun 2020 07:42 )             27.9   PT/INR - ( 05 Jun 2020 07:10 )   PT: 13.1 SEC;   INR: 1.13          PTT - ( 06 Jun 2020 21:20 )  PTT:105.4 SEC    Serum Pro-Brain Natriuretic Peptide: 86711 pg/mL (06-04 @ 18:54)      Echo:< from: Transthoracic Echocardiogram (02.19.20 @ 12:19) >  CONCLUSIONS:  1. Tethered mitral valve leaflets with normal opening.  Moderate-severe mitral regurgitation.  2. Calcified trileaflet aortic valve with normal opening.  Mild aortic regurgitation.  3. Severe global left ventricular systolic dysfunction.  4. Right ventricular enlargement with decreased right  ventricular systolic function.  5. Normal tricuspid valve. Moderate tricuspid  regurgitation.  6. Estimated pulmonary artery systolic pressure equals 50  mm Hg, assuming right atrial pressure equals 10  mm Hg,  consistent with moderate pulmonary hypertension.  ------------------------------------------------------------------------  Confirmed on  2/19/2020 - 13:12:47 by Jada Burns MD    < end of copied text >      Stress Testing:   none recent     Cath:  < from: Cardiac Cath Lab - Adult (11.17.16 @ 13:49) >  LM:   --  LM: Normal.  LAD:   --  Mid LAD: There was a 60 % stenosis. In a second lesion, there  was a 99 % stenosis. The lesion was eccentric andmoderately calcified.  --  Distal LAD: There was a diffuse 50 % stenosis. The lesion was  calcified.  --  D1: There was a 75 % stenosis in the middle third of the vessel  segment. There was a small vascular territory distal to the lesion.  CX:   --  Proximal circumflex: There was a 75 % stenosis. The lesion was  complex.  RCA:   --  Proximal RCA: There was a 100 % stenosis. There was poor  collateral blood supply to the distal myocardium.  COMPLICATIONS: No complications occurred during the cath lab visit.  DIAGNOSTIC IMPRESSIONS: Patient has severe diffsue coronary arterey disea  involving all three coronaries, no ideal for intervention or CABG.  DIAGNOSTIC RECOMMENDATIONS: Aggressive medical therapy and risk factor  modification.    < end of copied text >      Imaging:    Interpretation of Telemetry:

## 2020-06-07 NOTE — PROGRESS NOTE ADULT - SUBJECTIVE AND OBJECTIVE BOX
Patient is a 89y old  Male who presents with a chief complaint of Chest pain, ACS (07 Jun 2020 06:57)      INTERVAL HPI/OVERNIGHT EVENTS: had chest pain overnight    MEDICATIONS  (STANDING):  aspirin  chewable 81 milliGRAM(s) Oral daily  dextrose 5%. 1000 milliLiter(s) (50 mL/Hr) IV Continuous <Continuous>  dextrose 50% Injectable 12.5 Gram(s) IV Push once  dextrose 50% Injectable 25 Gram(s) IV Push once  dextrose 50% Injectable 25 Gram(s) IV Push once  escitalopram 5 milliGRAM(s) Oral daily  furosemide    Tablet 40 milliGRAM(s) Oral daily  heparin  Infusion. 600 Unit(s)/Hr (6 mL/Hr) IV Continuous <Continuous>  insulin lispro (HumaLOG) corrective regimen sliding scale   SubCutaneous three times a day before meals  insulin lispro (HumaLOG) corrective regimen sliding scale   SubCutaneous at bedtime  isosorbide   mononitrate ER Tablet (IMDUR) 120 milliGRAM(s) Oral daily  metoprolol tartrate 25 milliGRAM(s) Oral two times a day  pantoprazole    Tablet 40 milliGRAM(s) Oral before breakfast  simvastatin 40 milliGRAM(s) Oral at bedtime    MEDICATIONS  (PRN):  dextrose 40% Gel 15 Gram(s) Oral once PRN Blood Glucose LESS THAN 70 milliGRAM(s)/deciliter  glucagon  Injectable 1 milliGRAM(s) IntraMuscular once PRN Glucose LESS THAN 70 milligrams/deciliter  heparin   Injectable 4100 Unit(s) IV Push every 6 hours PRN For aPTT less than 40  morphine  - Injectable 2 milliGRAM(s) IV Push every 6 hours PRN Severe Pain (7 - 10)          Allergies    No Known Allergies    Intolerances        REVIEW OF SYSTEMS:  CARDIOVASCULAR: No chest pain, palpitations, dizziness, or leg swelling; no shortness of breath     RESPIRATORY: No cough, wheezing, chills or hemoptysis; No shortness of breath    GASTROINTESTINAL: No abdominal or epigastric pain. No nausea, vomiting, or hematemesis; No diarrhea or constipation. No melena or hematochezia.    NEUROLOGICAL: No headaches, memory loss, loss of strength, numbness      PHYSICAL EXAM:  Vital Signs Last 24 Hrs  T(C): 36.3 (07 Jun 2020 05:16), Max: 36.6 (07 Jun 2020 01:30)  T(F): 97.4 (07 Jun 2020 05:16), Max: 97.8 (07 Jun 2020 01:30)  HR: 75 (07 Jun 2020 05:16) (60 - 75)  BP: 130/70 (07 Jun 2020 05:16) (104/64 - 130/70)  BP(mean): --  RR: 18 (07 Jun 2020 05:16) (18 - 18)  SpO2: 100% (07 Jun 2020 05:16) (97% - 100%)    GENERAL: NAD, well-groomed, well-developed  HEAD:  Atraumatic, Normocephalic  EYES: EOMI, PERRLA, conjunctiva and sclera clear  NECK: Supple, No JVD, Normal thyroid  NERVOUS SYSTEM:  Alert & Oriented X3, Good concentration;  and symmetric  CHEST/LUNG: Clear to auscultation bilaterally; No rales, rhonchi, wheezing, or rubs  HEART: Split S1 and S2 regular, without murmur, rub nor gallop  ABDOMEN: Soft, Nontender, Nondistended; Bowel sounds present  EXTREMITIES:  2+ Peripheral Pulses, No clubbing, cyanosis, or edema      LABS:                        8.5    7.29  )-----------( 362      ( 06 Jun 2020 07:42 )             27.9     06 Jun 2020 14:23    136    |  98     |  38     ----------------------------<  152    4.0     |  25     |  1.91     Ca    8.7        06 Jun 2020 14:23  Phos  3.7       06 Jun 2020 14:23  Mg     1.7       06 Jun 2020 14:23      PTT - ( 06 Jun 2020 21:20 )  PTT:105.4 SEC  CAPILLARY BLOOD GLUCOSE  Troponin T, High Sensitivity: 195: Delta: 180 on 06/05/  RESULT CALLED TO: N/A  DATE / TIME CALLED: 06/06/20 1536  CALLED BY: CARMEN LY  Delta: 180 on 06/05/  ---------------------***PLEASE NOTE***----------------------  Rapid changes upward or downward in high-sensitivity  troponin levels strongly suggest acute myocardial injury.  Hemolysis may falsely lower results. Renal impairment may  increase results.    Normal: <6 - 14 ng/L  Indeterminate: 15 - 51 ng/L  Elevated: >51 ng/L    Please see "[email or web address] on the [facility]  intranet for more information. ng/L (06.06.20 @ 14:23)        POCT Blood Glucose.: 157 mg/dL (06 Jun 2020 21:36)  POCT Blood Glucose.: 172 mg/dL (06 Jun 2020 17:31)  POCT Blood Glucose.: 162 mg/dL (06 Jun 2020 11:32)  POCT Blood Glucose.: 177 mg/dL (06 Jun 2020 08:32)    Assessment and Plan:   · Assessment		  89-year old gentleman with a past medical history of chronic systolic congestive heart failure, triple-vessel coronary artery disease, history of coronary angioplasty last 2016, not amenable to PCI or CABG, DM2, HTN, CKD3,  now admitted with CP    · NSTEMI : ?  cont heparinization   cont medical management for CAD .. nt candidate for aggressie treatment   can change Metoprolol to 25 bid.  Unclear if last night's chest was GI or Cardiac.   Ambulate patient, observe for exertional chest pain. Can add Ranexxa .   Consider renal eval for epogen   Change Pantaprozole to qhs.      Chronic systolic CHF (congestive heart failure).  Plan:  restart o/p. diuretics   overall comfortable on NC       ·  Problem: DM (diabetes mellitus).  Plan: Insulin sliding scale    ·  Problem: Essential hypertension.  Plan: monitor   cont meds Follow BP       OOB to chair, advance activity as tolerated.       pt had MOLST form  last admission       Attending Attestation:   40 minutes spent on total encounter; more than 50% of the visit was spent counseling and/or coordinating care by the attending physician    Care Discussed with Consultants/Other Providers:

## 2020-06-07 NOTE — PROVIDER CONTACT NOTE (OTHER) - ASSESSMENT
Lungs clear and equal bilaterally. 1st degree block on tele monitor. Vital signs within parameters. C/o 9/10 chest pain/pressure. Nontender to palpation. Denies SOB.

## 2020-06-08 NOTE — DISCHARGE NOTE PROVIDER - CARE PROVIDER_API CALL
Aleksandr Shipman  INTERNAL MEDICINE  [address] [facility], [place] [address]  Phone: [phone]  Fax: [phone]  Established Patient  Follow Up Time: 1 week

## 2020-06-08 NOTE — PROGRESS NOTE ADULT - ASSESSMENT
personally saw and examined patient  labs, vitals reviewed  agree with above assessment and plan  CP improving  cont medical mgmt of CAD, anatomy not amenable to angioplasty

## 2020-06-08 NOTE — DISCHARGE NOTE PROVIDER - HOSPITAL COURSE
89-year old gentleman with a past medical history of chronic systolic congestive heart failure, triple-vessel coronary artery disease, history of coronary angioplasty last 2016, not amenable to PCI or CABG, DM2, HTN, CKD3,    now admitted with CP        · NSTEMI : ?    follow off  heparin    cont medical management for CAD ..       Metoprolol to 25 bid.  c.   Ambulate patient, observe for exertional chest pain.  Ranexxa added.   Consider renal eval for epogen   Changed Pantaprozole to qhs.          Chronic systolic CHF (congestive heart failure).  Plan:  restart o/p. diuretics     overall comfortable on NC             ·  Problem: DM (diabetes mellitus).  Plan: Insulin sliding scale        ·  Problem: Essential hypertension.  Plan: monitor     cont meds Follow BP           OOB to chair, advance activity as tolerated.             pt had MOLST form  last admission         D/C planning with home physical therapy.

## 2020-06-08 NOTE — PROGRESS NOTE ADULT - SUBJECTIVE AND OBJECTIVE BOX
Patient is a 89y old  Male who presents with a chief complaint of Chest pain, ACS (07 Jun 2020 07:23)      INTERVAL HPI/OVERNIGHT EVENTS: no chest pain over night     MEDICATIONS  (STANDING):  aspirin  chewable 81 milliGRAM(s) Oral daily  dextrose 5%. 1000 milliLiter(s) (50 mL/Hr) IV Continuous <Continuous>  dextrose 50% Injectable 12.5 Gram(s) IV Push once  dextrose 50% Injectable 25 Gram(s) IV Push once  dextrose 50% Injectable 25 Gram(s) IV Push once  escitalopram 5 milliGRAM(s) Oral daily  furosemide    Tablet 40 milliGRAM(s) Oral daily  insulin lispro (HumaLOG) corrective regimen sliding scale   SubCutaneous three times a day before meals  insulin lispro (HumaLOG) corrective regimen sliding scale   SubCutaneous at bedtime  isosorbide   mononitrate ER Tablet (IMDUR) 120 milliGRAM(s) Oral daily  metoprolol tartrate 25 milliGRAM(s) Oral two times a day  pantoprazole    Tablet 40 milliGRAM(s) Oral at bedtime  ranolazine 500 milliGRAM(s) Oral two times a day  simvastatin 40 milliGRAM(s) Oral at bedtime    MEDICATIONS  (PRN):  dextrose 40% Gel 15 Gram(s) Oral once PRN Blood Glucose LESS THAN 70 milliGRAM(s)/deciliter  glucagon  Injectable 1 milliGRAM(s) IntraMuscular once PRN Glucose LESS THAN 70 milligrams/deciliter  morphine  - Injectable 2 milliGRAM(s) IV Push every 6 hours PRN Severe Pain (7 - 10)        Orders last 24 hours:  Complete Blood Count: STAT (06-07-20 @ 11:18)  Type + Screen: STAT (06-07-20 @ 11:18)  12 Lead ECG (06-07-20 @ 11:53)  POCT  Blood Glucose (06-07-20 @ 11:55)  Activated Partial Thromboplastin Time:  Start Date:07-Jun-2020. STAT (06-07-20 @ 13:45)  POCT  Blood Glucose (06-07-20 @ 17:44)  Activated Partial Thromboplastin Time:  Start Date:07-Jun-2020. STAT (06-07-20 @ 20:27)  POCT  Blood Glucose (06-07-20 @ 21:26)  Complete Blood Count: AM Sched. Collection: 08-Jun-2020 06:00  Cancel Reason: Duplicate Order (06-08-20 @ 00:03)  Complete Blood Count: AM Sched. Collection: 08-Jun-2020 06:00  Cancel Reason: Duplicate Order (06-08-20 @ 00:03)  POCT  Blood Glucose (06-08-20 @ 08:09)      Allergies    No Known Allergies    Intolerances        REVIEW OF SYSTEMS:  CARDIOVASCULAR: No chest pain, palpitations, dizziness, or leg swelling; no shortness of breath     RESPIRATORY: No cough, wheezing, chills or hemoptysis; No shortness of breath    GASTROINTESTINAL: No abdominal or epigastric pain. No nausea, vomiting, or hematemesis; No diarrhea or constipation. No melena or hematochezia.    NEUROLOGICAL: No headaches, memory loss, loss of strength, numbness      PHYSICAL EXAM:  Vital Signs Last 24 Hrs  T(C): 36.4 (08 Jun 2020 05:25), Max: 36.8 (07 Jun 2020 10:02)  T(F): 97.6 (08 Jun 2020 05:25), Max: 98.2 (07 Jun 2020 10:02)  HR: 78 (08 Jun 2020 05:25) (68 - 86)  BP: 132/85 (08 Jun 2020 05:25) (110/60 - 145/84)  BP(mean): --  RR: 18 (08 Jun 2020 05:25) (18 - 18)  SpO2: 99% (08 Jun 2020 05:25) (97% - 100%)    GENERAL: NAD, well-groomed, well-developed  HEAD:  Atraumatic, Normocephalic  EYES: EOMI, PERRLA, conjunctiva and sclera clear  NECK: Supple, No JVD, Normal thyroid  NERVOUS SYSTEM:  Alert & Oriented X3, Good concentration;  and symmetric  CHEST/LUNG: Clear to auscultation bilaterally; No rales, rhonchi, wheezing, or rubs  HEART: Fixed split S1S2 regular, without murmur, rub nor gallop  ABDOMEN: Soft, Nontender, Nondistended; Bowel sounds present  EXTREMITIES: 12+ Peripheral Pulses, No clubbing, cyanosis, or edema      LABS:                        8.7    7.32  )-----------( 359      ( 08 Jun 2020 06:13 )             29.1       Ca    8.6        07 Jun 2020 06:27      PTT - ( 07 Jun 2020 14:05 )  PTT:42.6 SEC  CAPILLARY BLOOD GLUCOSE      POCT Blood Glucose.: 169 mg/dL (08 Jun 2020 08:08)  POCT Blood Glucose.: 200 mg/dL (07 Jun 2020 21:21)  POCT Blood Glucose.: 148 mg/dL (07 Jun 2020 17:38)  POCT Blood Glucose.: 202 mg/dL (07 Jun 2020 11:54):    Imaging Personally Reviewed:  [ ] YES     Consultant(s) Notes Reviewed:  PT      Assessment and Plan:   · Assessment		  89-year old gentleman with a past medical history of chronic systolic congestive heart failure, triple-vessel coronary artery disease, history of coronary angioplasty last 2016, not amenable to PCI or CABG, DM2, HTN, CKD3,  now admitted with CP    · NSTEMI : ?  follow off  heparin  cont medical management for CAD ..     Metoprolol to 25 bid.  c.   Ambulate patient, observe for exertional chest pain.  Ranexxa added.   Consider renal eval for epogen   Changed Pantaprozole to qhs.      Chronic systolic CHF (congestive heart failure).  Plan:  restart o/p. diuretics   overall comfortable on NC       ·  Problem: DM (diabetes mellitus).  Plan: Insulin sliding scale    ·  Problem: Essential hypertension.  Plan: monitor   cont meds Follow BP       OOB to chair, advance activity as tolerated.       pt had MOLST form  last admission     D/C planning with home physical therapy.     Attending Attestation:   40 minutes spent on total encounter; more than 50% of the visit was spent counseling and/or coordinating care by the attending physician

## 2020-06-08 NOTE — PROGRESS NOTE ADULT - REASON FOR ADMISSION
Chest pain, ACS

## 2020-06-08 NOTE — DISCHARGE NOTE NURSING/CASE MANAGEMENT/SOCIAL WORK - PATIENT PORTAL LINK FT
You can access the FollowMyHealth Patient Portal offered by [facility] by registering at the following website: [email or web address]. By joining [facility]’s FollowMyHealth portal, you will also be able to view your health information using other applications (apps) compatible with our system.

## 2020-06-08 NOTE — DISCHARGE NOTE PROVIDER - NSDCMRMEDTOKEN_GEN_ALL_CORE_FT
aspirin 81 mg oral delayed release tablet: 1 tab(s) orally once a day  cholecalciferol oral tablet: 5000 unit(s) orally once a day  clopidogrel 75 mg oral tablet: 1 tab(s) orally once a day  escitalopram 5 mg oral tablet: 1 tab(s) orally once a day   folic acid 1 mg oral tablet: 1 tab(s) orally once a day (Last filled Andrea/2020 x 3 months)  furosemide 40 mg oral tablet: 1 tab(s) orally once a day  isosorbide mononitrate 120 mg oral tablet, extended release: 1 tab(s) orally once a day   metFORMIN 1000 mg oral tablet: 1 tab(s) orally 2 times a day   metoprolol tartrate 25 mg oral tablet: 1 tab(s) orally 2 times a day  pantoprazole 40 mg oral delayed release tablet: 1 tab(s) orally once a day (before a meal)   ranolazine 500 mg oral tablet, extended release: 1 tab(s) orally 2 times a day  simvastatin 40 mg oral tablet: 1 tab(s) orally once a day

## 2020-06-08 NOTE — PHARMACOTHERAPY INTERVENTION NOTE - COMMENTS
Medication history is incomplete. Concern for non-compliance. Discrepancies noted in provider handoff.

## 2020-06-08 NOTE — PROGRESS NOTE ADULT - SUBJECTIVE AND OBJECTIVE BOX
Patient seen and examined at bedside.    Overnight Events:  no issues or events overnight    ROS: denies shortness of breath/chest pain, feeling asymptomatic    Current Meds:  aspirin  chewable 81 milliGRAM(s) Oral daily  dextrose 40% Gel 15 Gram(s) Oral once PRN  dextrose 5%. 1000 milliLiter(s) IV Continuous <Continuous>  dextrose 50% Injectable 12.5 Gram(s) IV Push once  dextrose 50% Injectable 25 Gram(s) IV Push once  dextrose 50% Injectable 25 Gram(s) IV Push once  escitalopram 5 milliGRAM(s) Oral daily  furosemide    Tablet 40 milliGRAM(s) Oral daily  glucagon  Injectable 1 milliGRAM(s) IntraMuscular once PRN  insulin lispro (HumaLOG) corrective regimen sliding scale   SubCutaneous three times a day before meals  insulin lispro (HumaLOG) corrective regimen sliding scale   SubCutaneous at bedtime  isosorbide   mononitrate ER Tablet (IMDUR) 120 milliGRAM(s) Oral daily  metoprolol tartrate 25 milliGRAM(s) Oral two times a day  morphine  - Injectable 2 milliGRAM(s) IV Push every 6 hours PRN  pantoprazole    Tablet 40 milliGRAM(s) Oral at bedtime  ranolazine 500 milliGRAM(s) Oral two times a day  simvastatin 40 milliGRAM(s) Oral at bedtime      Vitals:  T(F): 97.4 (06-08), Max: 98.1 (06-07)  HR: 84 (06-08) (68 - 86)  BP: 100/62 (06-08) (100/62 - 145/84)  RR: 17 (06-08)  SpO2: 100% (06-08)  I&O's Summary    07 Jun 2020 07:01  -  08 Jun 2020 07:00  --------------------------------------------------------  ·	IN: 850 mL / OUT: 1050 mL / NET: -200 mL      Physical Exam:  Appearance: No acute distress, elderly, chronically ill appearing  Eyes: PERRL, EOMI, pink conjunctiva  HENT: Normal oral muscosa  Cardiovascular: RRR, S1, S2, no murmurs, rubs, or gallops; no edema; no JVD  Respiratory: Clear to auscultation bilaterally  Gastrointestinal: soft, non-tender, non-distended with normal bowel sounds  Musculoskeletal: No clubbing; no joint deformity   Neurologic: Non-focal  Psychiatry: AAOx3, mood & affect appropriate  Skin: No rashes, ecchymoses, or cyanosis                       8.7    7.32  )-----------( 359      ( 08 Jun 2020 06:13 )             29.1     06-07    138  |  101  |  42<H>  ----------------------------<  143<H>  3.9   |  23  |  1.92<H>    Ca    8.6      07 Jun 2020 06:27  Phos  3.5     06-07  Mg     1.7     06-07      PTT - ( 07 Jun 2020 14:05 )  PTT:42.6 SEC  CARDIAC MARKERS ( 05 Jun 2020 15:00 )  x     / x     / x     / 74 u/L / 6.11 ng/mL / x      CARDIAC MARKERS ( 05 Jun 2020 05:15 )  x     / x     / x     / 89 u/L / 4.79 ng/mL / x      CARDIAC MARKERS ( 04 Jun 2020 22:05 )  x     / x     / x     / 48 u/L / 3.09 ng/mL / x        Serum Pro-Brain Natriuretic Peptide: 90407 pg/mL (06-04 @ 18:54)    New ECG(s): Personally reviewed    Echo:  < from: Transthoracic Echocardiogram (02.19.20 @ 12:19) >  CONCLUSIONS:  1. Tethered mitral valve leaflets with normal opening.  Moderate-severe mitral regurgitation.  2. Calcified trileaflet aortic valve with normal opening.  Mild aortic regurgitation.  3. Severe global left ventricular systolic dysfunction.  4. Right ventricular enlargement with decreased right  ventricular systolic function.  5. Normal tricuspid valve. Moderate tricuspid  regurgitation.  6. Estimated pulmonary artery systolic pressure equals 50  mm Hg, assuming right atrial pressure equals 10  mm Hg,  consistent with moderate pulmonary hypertension.    < end of copied text >    Cath  < from: Cardiac Cath Lab - Adult (11.17.16 @ 13:49) >  CORONARY VESSELS: The coronary circulation is right dominant.  LM:   --  LM: Normal.  LAD:   --  Mid LAD: There was a 60 % stenosis. In a second lesion, there  was a 99 % stenosis. The lesion was eccentric andmoderately calcified.  --  Distal LAD: There was a diffuse 50 % stenosis. The lesion was  calcified.  --  D1: There was a 75 % stenosis in the middle third of the vessel  segment. There was a small vascular territory distal to the lesion.  CX:   --  Proximal circumflex: There was a 75 % stenosis. The lesion was  complex.  RCA:   --  Proximal RCA: There was a 100 % stenosis. There was poor  collateral blood supply to the distal myocardium.  COMPLICATIONS: No complications occurred during the cath lab visit.  DIAGNOSTIC IMPRESSIONS: Patient has severe diffsue coronary arterey disea  involving all three coronaries, no ideal for intervention or CABG.  DIAGNOSTIC RECOMMENDATIONS: Aggressive medical therapy and risk factor  modification.    < end of copied text >    Interpretation of Telemetry: sinus 60s, 10 beats of NSVT yesterday afternoon    Assessment and Plan:      89 year old man with history of CAD and prior cath in 2016 revealing severe three vessel disease with poor vessel targets for bypass and deemed also a poor candidate for PCI being managed medically, heart failure with severely reduced EF and severe MR, severe pulm HTN, now presents with chest pain, and troponin elevation, concerning for possible NSTEMI.     #NSTEMI/ CAD   - continue ASA 81 mg daily .   - continue Metoprolol   - start on plavix 75 qday    # HFrEF, appears euvolemic  - continue lasix 40 mg PO daily,  - conitinue Imdur 120 mg daily   - continue Simvastatin 40 mg daily   - would benefit from ACEi or ARB , but with LEONCIO at present.     No further cardiac testing or interventions are necessary.  Please call back with questions.    Gabriel Porter MD  Cardiology Fellow   [phone]  For all other Cardiology service contact information, go to [email or web address] and use "[email or web address]" to login.

## 2020-06-08 NOTE — DISCHARGE NOTE PROVIDER - NSDCCPCAREPLAN_GEN_ALL_CORE_FT
PRINCIPAL DISCHARGE DIAGNOSIS  Diagnosis: NSTEMI (non-ST elevated myocardial infarction)  Assessment and Plan of Treatment: Continue aspirin and Plavix, do not stop unless instructed by your physician.  Continue low salt, fat, cholesterol and carbohydrate diet. Follow up with cardiologist and primary care physician's routine appointment.        SECONDARY DISCHARGE DIAGNOSES  Diagnosis: Acute on chronic systolic congestive heart failure  Assessment and Plan of Treatment: Low salt diet, fluid restriction to 1500 ml daily, monitor your fluid intake and weight daily, exercise as tolerated 30 minutes daily, and follow up with your physician within 1 to 2 weeks.    Diagnosis: Chronic kidney disease, unspecified CKD stage  Assessment and Plan of Treatment: Continue blood pressure, cholesterol and diabetic medications. Goal of hemoglobin A1C (HgbA1C) < 7%.  Avoid nephrotoxic drugs such as nonsteroidal anti-inflammatory agents (NSAIDs).   Please follow up with your nephrologist to monitor your kidney function, continue with low protein and potassium diet.    Diagnosis: Type 2 diabetes mellitus with other specified complication, without long-term current use of insulin  Assessment and Plan of Treatment: Monitor finger sticks pre-meal and bedtime, low salt, fat and carbohydrate diet, minimize glucose intake.  Exercise daily for at least 30 minutes and weight loss.  Follow up with primary care physician and endocrinologist for routine Hemoglobin A1C checks and management.  Follow up with your ophthalmologist for routine yearly vision exams.    Diagnosis: Essential hypertension  Assessment and Plan of Treatment: Low sodium and fat diet, continue anti-hypertensive medications, and follow up with primary care physician.

## 2020-06-10 PROBLEM — I50.9 HEART FAILURE, UNSPECIFIED: Chronic | Status: ACTIVE | Noted: 2020-01-01

## 2020-06-10 NOTE — H&P ADULT - HISTORY OF PRESENT ILLNESS
89 y.o M pmh HTN. DM2 not on insulin, CKD3, CAD c TVD not amenable to surgery or PCI, Chronic systolic CHF (sever LV Dysfunction), S/P PPM (medtronic) and discharged 6/9 after admission for NSTEMI presents with chest pain. 89 y.o M pmh HTN. DM2 not on insulin, CKD3, CAD c TVD not amenable to surgery or PCI, Chronic systolic CHF (sever LV Dysfunction), S/P PPM (medtronic) and discharged 6/9 after admission for NSTEMI presents with chest pain. Patient states he was admitted in the hospital from 6/4-6/9, during that admission cardiology followed the patient for NSTEMI and advised medical management and initiated Ranexa. Patient states that this AM had mid sternal chest pressure 7/10, non-radiating, lasted for a "long time", pain very similar to pain from previous admission. Patient did not take his medications when he got home last night, unsure if he got them before being discharged.  At this time patient is pain free. Denies fever, chills, nausea, vomiting, abd pain, brbpr, melena, dysuria, hematuria, weakness, visual change.

## 2020-06-10 NOTE — ED PROVIDER NOTE - CLINICAL SUMMARY MEDICAL DECISION MAKING FREE TEXT BOX
89M with HTN, T2DM, CKD, CAD with triple vessel disease not amenable to PCI or CABG, CHFrEF, p/w chest pain, new TWI, concerning for ACS. Check labs with troponins, CXR, and admit. Pt s/p asa with ems.

## 2020-06-10 NOTE — ED PROVIDER NOTE - PSH
H/O coronary angioplasty  last 2016, not amenable to PCI or CABG  History of permanent cardiac pacemaker placement

## 2020-06-10 NOTE — ED ADULT TRIAGE NOTE - PAIN: PRESENCE, MLM
April 6, 2017      Regarding: Carrie Doll  YOB: 1956   Juror ID #[id number]      To whom it may concern:    The above named patient cannot sit for long extended periods and need to move and stretch frequently. Also she can not navigate steps very well.   Her multiple medical problems hamper her capacity to do so.        Please dont hesitate to call our clinic if you have any additional questions or concerns.      Thank you,       Barry Galan MD  [facility]  [address] [place] [address]  [id number]  [place], [place] [id number] [phone]   complains of pain/discomfort

## 2020-06-10 NOTE — ED ADULT NURSE NOTE - OBJECTIVE STATEMENT
Pt received in bed #1. C/o generalized chest pain 7/10 with SOB. Pt was admitted @ [facility] recently for tx of NSTEMI, was discharged yesterday and now back bc chest pain is back. Pt states pain is worse when he moves. Was given aspirin by EMS. Hx of angioplasty, CHF DM2, HTN, and CAD. Pt is A&Ox4, ambulates with a cane. Breathing even and unlabored. Placed on 2L O2 NC. On cardiac monitor, normal sinus rhythm. Vitals are stable. 2+ pitting edema on lower extremities bilat. 2+ radial and pedal pulses bilat. Denies headache, dizziness, nausea, or vomiting. MD at bedside. Will continue to monitor.

## 2020-06-10 NOTE — ED ADULT NURSE REASSESSMENT NOTE - NS ED NURSE REASSESS COMMENT FT1
pt resting comfortably, denies any complaints at this time. Vitals are stable as documented. Will continue to monitor.

## 2020-06-10 NOTE — H&P ADULT - ASSESSMENT
89 y.o M pmh HTN. DM2 not on insulin, CKD3, CAD c TVD not amenable to surgery or PCI, Chronic systolic CHF (sever LV Dysfunction), S/P PPM (medtronic) and discharged 6/9 after admission for NSTEMI presents with chest pain. 89 y.o M pmh HTN. DM2 not on insulin, CKD3, CAD c TVD not amenable to surgery or PCI, Chronic systolic CHF (sever LV Dysfunction), S/P PPM (medtronic) and discharged 6/9 after admission for NSTEMI presents with chest pain admitted for acute on chronic systolic heart failure.

## 2020-06-10 NOTE — CONSULT NOTE ADULT - ASSESSMENT
89 y.o M pmh HTN. DM2 not on insulin, CKD3, CAD c TVD not amenable to surgery or PCI, Chronic systolic CHF (sever LV Dysfunction), S/P PPM (medtronic) and discharged 6/9 after admission for NSTEMI presents with chest pain 89 y.o M pmh HTN. DM2 not on insulin, CKD3, CAD c TVD not amenable to surgery or PCI, Chronic systolic CHF (sever LV Dysfunction), S/P PPM (medtronic) and discharged 6/9 after admission for NSTEMI presents with chest pain  CKD stage 4 at present.  Given the age the exact etiology is not important  Anemia needs to be fixed in order help with sx of chest pain     1 Renal - Cont with the IV lasix for now but volume status seems to be improving as there was no JVD  No need to repeat renal sono  Check PTH and Phos     2 Anemia- Check iron stores and start retacrit    3 CVS-At present not with chest pain.  Defer to cards re increasing Lopressor for lowering heart rate   ?Add Hydralazine to lower BP

## 2020-06-10 NOTE — H&P ADULT - PROBLEM SELECTOR PLAN 1
Admit to tele  Patient with volume overload on exam - S/P Lasix 40mg IV in ED  Appreciate house cardiology consult - will give IV Lasix 80mg x1 and start on IV lasix 80mg BID  Daily I+O. weights, fluid restrict 1L  Keep K > 4.0 and Mg > 2.0  Repeat trop per cardio recommendations

## 2020-06-10 NOTE — H&P ADULT - NSICDXPASTSURGICALHX_GEN_ALL_CORE_FT
PAST SURGICAL HISTORY:  H/O coronary angioplasty last 2016, not amenable to PCI or CABG    History of permanent cardiac pacemaker placement Medtronic

## 2020-06-10 NOTE — ED PROVIDER NOTE - OBJECTIVE STATEMENT
89M with HTN, T2DM, CKD, CAD with triple vessel disease not amenable to PCI or CABG, CHFrEF, p/w chest pain. Pt was d/c from [facility] yesterday where he was treated for NSTEMI. Pt not candidate for invasvive intervention per cardiology notes. Pt was d/c chest pain free. Yesterday afternoon chest pain returned after discharge. Pt also reports SOB. Denies abd pain, n/v/d, fever, chills 89M with HTN, T2DM, CKD, CAD with triple vessel disease not amenable to PCI or CABG, CHFrEF, p/w chest pain. Pt was d/c from [facility] yesterday where he was treated for NSTEMI. Pt not candidate for invasive intervention per cardiology notes. Pt was d/c chest pain free. Yesterday afternoon chest pain returned after discharge. Pt also reports SOB. Denies abd pain, n/v/d, fever, chills

## 2020-06-10 NOTE — ED ADULT TRIAGE NOTE - CHIEF COMPLAINT QUOTE
Pt arrives to ED via EMS from home c/o left sided CP since this afternoon.  Pt reports hx of angioplasty.  Pt was discharged yesterday morning from [facility] following cardiac admission.  Pt received 162mg ASA from EMS.  Pt A&Ox3.

## 2020-06-10 NOTE — H&P ADULT - PROBLEM SELECTOR PLAN 7
Likely due to chronic disease (MCV 91), but possibly due to ckd.  Will consult nephrology regarding possible Epogen

## 2020-06-10 NOTE — H&P ADULT - NSICDXPASTMEDICALHX_GEN_ALL_CORE_FT
PAST MEDICAL HISTORY:  CAD (coronary artery disease) triple vessel disease    Congestive heart failure     DM (diabetes mellitus)     Essential hypertension     History of bradycardia s/p medtronic PPM

## 2020-06-10 NOTE — H&P ADULT - NSHPREVIEWOFSYSTEMS_GEN_ALL_CORE
Denies fever, chills, nausea, vomiting, abd pain, brbpr, melena, dysuria, hematuria, weakness, visual change.

## 2020-06-10 NOTE — H&P ADULT - NSHPLABSRESULTS_GEN_ALL_CORE
7.9    7.01  )-----------( 285      ( 10 Christophe 2020 06:10 )             25.8     06-10    140  |  102  |  41<H>  ----------------------------<  149<H>  3.8   |  23  |  1.93<H>    Ca    8.8      10 Christophe 2020 06:10    TPro  5.6<L>  /  Alb  3.0<L>  /  TBili  0.2  /  DBili  x   /  AST  14  /  ALT  9   /  AlkPhos  73  06-10    EKG SR c 1st AVB, NIVCD, TWI  in v3-6, II, III  BNP 24789   CXR B/L Small effusion  Trop 175-177

## 2020-06-10 NOTE — ED PROVIDER NOTE - PHYSICAL EXAMINATION
Gen: NAD, AOx3  Head: NCAT  HEENT: PERRL, oral mucosa moist, normal conjunctiva, oropharynx clear without exudate or erythema  Lung: scattered crackles b/l, no respiratory distress, no wheezing, rales, rhonchi  CV: normal s1/s2, rrr, no murmurs, Normal perfusion, pulses 2+ throughout  Abd: soft, NTND, no CVA tenderness  MSK: 2+ pitting edema b/l. no visible deformities, full range of motion in all 4 extremities  Neuro: CN II-XII grossly intact, No focal neurologic deficits  Skin: No rash   Psych: normal affect

## 2020-06-10 NOTE — ED PROVIDER NOTE - ATTENDING CONTRIBUTION TO CARE
Afebrile. Awake and Alert. Lungs CTA. Heart RRR. Abdomen soft NTND. CN II-XII grossly intact. Moves all extremities without lateralization.    r/o ACS: new TWI in lateral leads

## 2020-06-10 NOTE — ED PROVIDER NOTE - PMH
CAD (coronary artery disease)  triple vessel disease  Congestive heart failure    DM (diabetes mellitus)    Essential hypertension    History of bradycardia

## 2020-06-10 NOTE — H&P ADULT - PROBLEM SELECTOR PLAN 3
Recent admit for NSTEMI with medical management  COntinue home medications including anti-anginals. Recent admit for NSTEMI with medical management  COntinue home medications including anti-anginals.  pt with known three vessel CAD, not amenable to intervention  will try s/l ntg for persistent pain

## 2020-06-10 NOTE — ED ADULT NURSE REASSESSMENT NOTE - NS ED NURSE REASSESS COMMENT FT1
Pt sleeping. Breathing even and unlabored, not in any distress. On 2L O2 NC. Normal sinus rhythm on the monitor. Comfort measures provided. Will continue to monitor.

## 2020-06-10 NOTE — CONSULT NOTE ADULT - ATTENDING COMMENTS
Personally saw and examined patient  labs/vitals reviewed  agree with above assessment and plan  89M pt with PMH of HTN, HLD, DM, known CAD with multi-vessel disease on [facility] in 2016 without lesions amenable for PCI/CABG recently d/c now returns with chest pressure and dyspnea, evidence of volume overload.    -cont diuresis, once more euvolemic, will uptitrate antianginal therapy as hemodynamics allow.   -will follow

## 2020-06-10 NOTE — ED ADULT NURSE NOTE - CHIEF COMPLAINT QUOTE
Pt arrives to ED via EMS from home c/o left sided CP since this afternoon.  Pt reports hx of angioplasty.  Pt was discharged yesterday morning from [facility] following cardiac admission.  Pt received 162mg ASA from EMS.  Pt A&Ox3.

## 2020-06-10 NOTE — CONSULT NOTE ADULT - SUBJECTIVE AND OBJECTIVE BOX
Patient seen and evaluated at bedside    Chief Complaint: chest pressure    HPI:  Mr 89 yo with  HTN, HLD, DM, known CAD with multi-vessel disease on [facility] in 2016 without lesions amenable for PCI/CABG per report presented from home with chest pressure he is very sleepy and history is limited.  He saids pain is associated with dyspnea, worse with laying flat on his back.  Has decreased appetite and some cough.  He says cp was about 5/10, pressurelike.  Not clear if he took all his antianginal medications. He was recently here for NSTEMI treated medically.   On ER evaluation his trops continue to trend down.  He does have elevated bnp.    PMHx:   History of bradycardia  Congestive heart failure  Essential hypertension  DM (diabetes mellitus)  CAD (coronary artery disease)    PSHx:   History of permanent cardiac pacemaker placement  H/O coronary angioplasty  No significant past surgical history      Allergies:  No Known Allergies    Home Meds:  aspirin 81 mg oral delayed release tablet: 1 tab(s) orally once a day (08 Jun 2020 10:41)  cholecalciferol oral tablet: 5000 unit(s) orally once a day (08 Jun 2020 10:41)  escitalopram 5 mg oral tablet: 1 tab(s) orally once a day  (08 Jun 2020 13:59)  folic acid 1 mg oral tablet: 1 tab(s) orally once a day (Last filled Andrea/2020 x 3 months) (08 Jun 2020 10:41)  simvastatin 40 mg oral tablet: 1 tab(s) orally once a day  (08 Jun 2020 13:59)    Current Medications:  see chart      FAMILY HISTORY:  Family history of coronary artery disease  Family history of diabetes mellitus    Social History:  denies smoking/alcohol    REVIEW OF SYSTEMS:  CONSTITUTIONAL: No weakness, fevers or chills  EYES/ENT: No visual changes;  No dysphagia  NECK: No pain or stiffness  RESPIRATORY: see hpi  CARDIOVASCULAR: see hpi  GASTROINTESTINAL: No abdominal or epigastric pain. No nausea, vomiting, or hematemesis  BACK: No back pain  GENITOURINARY: No dysuria, frequency or hematuria  NEUROLOGICAL: No numbness or weakness  SKIN: No itching, burning, rashes, or lesions   All other review of systems is negative unless indicated above.    Physical Exam:  T(F): 97.9 (06-10), Max: 98.2 (06-10)  HR: 78 (06-10) (78 - 95)  BP: 141/83 (06-10) (123/83 - 141/83)  RR: 18 (06-10)  SpO2: 100% (06-10)  GENERAL: No acute distress, elderly frail, exhausted  HEAD:  Atraumatic, Normocephalic  ENT: EOMI, Neck supple, + JVD, moist mucosa  CHEST/LUNG: Decreased breath sounds in lower lung fields   BACK: No spinal tenderness  HEART: Regular rate and rhythm; No murmurs, rubs, or gallops  ABDOMEN: Soft, Nontender, Nondistended; Bowel sounds present  EXTREMITIES:  No clubbing, cyanosis, or edema  PSYCH: Nl behavior, nl affect  NEUROLOGY: AAOx3, non-focal,  SKIN: Normal color, No rashes or lesions    Cardiovascular Diagnostic Testing:    ECG: pending    Echo:  < from: Transthoracic Echocardiogram (02.19.20 @ 12:19) >  CONCLUSIONS:  1. Tethered mitral valve leaflets with normal opening.  Moderate-severe mitral regurgitation.  2. Calcified trileaflet aortic valve with normal opening.  Mild aortic regurgitation.  3. Severe global left ventricular systolic dysfunction.  4. Right ventricular enlargement with decreased right  ventricular systolic function.  5. Normal tricuspid valve. Moderate tricuspid  regurgitation.  6. Estimated pulmonary artery systolic pressure equals 50  mm Hg, assuming right atrial pressure equals 10  mm Hg,  consistent with moderate pulmonary hypertension.  ------------------------------------------------------------------------  Confirmed on  2/19/2020 - 13:12:47 by Jada Burns MD    < end of copied text >      Stress Testing:    Cath:  < from: Cardiac Cath Lab - Adult (11.17.16 @ 13:49) >  CORONARY VESSELS: The coronary circulation is right dominant.  LM:   --  LM: Normal.  LAD:   --  Mid LAD: There was a 60 % stenosis. In a second lesion, there  was a 99 % stenosis. The lesion was eccentric andmoderately calcified.  --  Distal LAD: There was a diffuse 50 % stenosis. The lesion was  calcified.  --  D1: There was a 75 % stenosis in the middle third of the vessel  segment. There was a small vascular territory distal to the lesion.  CX:   --  Proximal circumflex: There was a 75 % stenosis. The lesion was  complex.  RCA:   --  Proximal RCA: There was a 100 % stenosis. There was poor  collateral blood supply to the distal myocardium.  COMPLICATIONS: No complications occurred during the cath lab visit.  DIAGNOSTIC IMPRESSIONS: Patient has severe diffsue coronary arterey disea  involving all three coronaries, no ideal for intervention or CABG.  DIAGNOSTIC RECOMMENDATIONS: Aggressive medical therapy and risk factor  modification.  Prepared and signed by  Lior Rivera M.D.    < end of copied text >    Imaging:    CXR: pleural effusions    Labs: Personally reviewed                        7.9 7.01  )-----------( 285      ( 10 Christophe 2020 06:10 )             25.8     06-10    140  |  102  |  41<H>  ----------------------------<  149<H>  3.8   |  23  |  1.93<H>    Ca    8.8      10 Christophe 2020 06:10    TPro  5.6<L>  /  Alb  3.0<L>  /  TBili  0.2  /  DBili  x   /  AST  14  /  ALT  9   /  AlkPhos  73  06-10    PT/INR - ( 10 Christophe 2020 07:45 )   PT: 13.0 SEC;   INR: 1.14       PTT - ( 10 Christophe 2020 07:45 )  PTT:43.2 SEC    CARDIAC MARKERS ( 10 Christophe 2020 07:47 )  x     / x     / x     / 60 u/L / 3.36 ng/mL / x      CARDIAC MARKERS ( 05 Jun 2020 15:00 )  x     / x     / x     / 74 u/L / 6.11 ng/mL / x      CARDIAC MARKERS ( 05 Jun 2020 05:15 )  x     / x     / x     / 89 u/L / 4.79 ng/mL / x      CARDIAC MARKERS ( 04 Jun 2020 22:05 )  x     / x     / x     / 48 u/L / 3.09 ng/mL / x        Serum Pro-Brain Natriuretic Peptide: 59753 pg/mL (06-10 @ 06:10)  Serum Pro-Brain Natriuretic Peptide: 72025 pg/mL (06-04 @ 18:54)    Assessment and Plan:    89 year old M pt with PMH of HTN, HLD, DM, known CAD with multi-vessel disease on [facility] in 2016 without lesions amenable for PCI/CABG per report presented from home with chest pressure and dyspnea.  He had recent NSTEMI, still on medical therapy.  I think his symptoms are mostly due to a CHF exacerbation, may also be related to noncompliance with anti-anginals.    Acute on chronic heart failure  - give 1 time dose of 80 mg IV  - would put on IV lasix 80 mg BID  - strict ins and outs, daily wts  - fluids and sodium restriction  - HF Team to see in am for further recs  - consider palliative care consult  - monitor on Tele  - replete K and Mg    CAD, recent NSTEMI  - continue home meds ASA, Plavix     For all Cardiology service contact information, go to [email or web address] and use "[email or web address]" to login. Patient seen and evaluated at bedside    Chief Complaint: chest pressure    HPI:  Mr 89 yo with  HTN, HLD, DM, known CAD with multi-vessel disease on [facility] in 2016 without lesions amenable for PCI/CABG per report presented from home with chest pressure he is very sleepy and history is limited.  He saids pain is associated with dyspnea, worse with laying flat on his back.  Has decreased appetite and some cough.  He says cp was about 5/10, pressurelike.  Not clear if he took all his antianginal medications. He was recently here for NSTEMI treated medically.   On ER evaluation his trops continue to trend down.  He does have elevated bnp.    PMHx:   History of bradycardia  Congestive heart failure  Essential hypertension  DM (diabetes mellitus)  CAD (coronary artery disease)    PSHx:   History of permanent cardiac pacemaker placement  H/O coronary angioplasty  No significant past surgical history      Allergies:  No Known Allergies    Home Meds:  aspirin 81 mg oral delayed release tablet: 1 tab(s) orally once a day (08 Jun 2020 10:41)  cholecalciferol oral tablet: 5000 unit(s) orally once a day (08 Jun 2020 10:41)  escitalopram 5 mg oral tablet: 1 tab(s) orally once a day  (08 Jun 2020 13:59)  folic acid 1 mg oral tablet: 1 tab(s) orally once a day (Last filled Andrea/2020 x 3 months) (08 Jun 2020 10:41)  simvastatin 40 mg oral tablet: 1 tab(s) orally once a day  (08 Jun 2020 13:59)    Current Medications:  see chart      FAMILY HISTORY:  Family history of coronary artery disease  Family history of diabetes mellitus    Social History:  denies smoking/alcohol    REVIEW OF SYSTEMS:  CONSTITUTIONAL: No weakness, fevers or chills  EYES/ENT: No visual changes;  No dysphagia  NECK: No pain or stiffness  RESPIRATORY: see hpi  CARDIOVASCULAR: see hpi  GASTROINTESTINAL: No abdominal or epigastric pain. No nausea, vomiting, or hematemesis  BACK: No back pain  GENITOURINARY: No dysuria, frequency or hematuria  NEUROLOGICAL: No numbness or weakness  SKIN: No itching, burning, rashes, or lesions   All other review of systems is negative unless indicated above.    Physical Exam:  T(F): 97.9 (06-10), Max: 98.2 (06-10)  HR: 78 (06-10) (78 - 95)  BP: 141/83 (06-10) (123/83 - 141/83)  RR: 18 (06-10)  SpO2: 100% (06-10)  GENERAL: No acute distress, elderly frail, exhausted  HEAD:  Atraumatic, Normocephalic  ENT: EOMI, Neck supple, + JVD, moist mucosa  CHEST/LUNG: Decreased breath sounds in lower lung fields   BACK: No spinal tenderness  HEART: Regular rate and rhythm; No murmurs, rubs, or gallops  ABDOMEN: Soft, Nontender, Nondistended; Bowel sounds present  EXTREMITIES:  No clubbing, cyanosis, or edema  PSYCH: Nl behavior, nl affect  NEUROLOGY: AAOx3, non-focal,  SKIN: Normal color, No rashes or lesions    Cardiovascular Diagnostic Testing:    ECG: pending    Echo:  < from: Transthoracic Echocardiogram (02.19.20 @ 12:19) >  CONCLUSIONS:  1. Tethered mitral valve leaflets with normal opening.  Moderate-severe mitral regurgitation.  2. Calcified trileaflet aortic valve with normal opening.  Mild aortic regurgitation.  3. Severe global left ventricular systolic dysfunction.  4. Right ventricular enlargement with decreased right  ventricular systolic function.  5. Normal tricuspid valve. Moderate tricuspid  regurgitation.  6. Estimated pulmonary artery systolic pressure equals 50  mm Hg, assuming right atrial pressure equals 10  mm Hg,  consistent with moderate pulmonary hypertension.  ------------------------------------------------------------------------  Confirmed on  2/19/2020 - 13:12:47 by Jada Burns MD    < end of copied text >      Stress Testing:    Cath:  < from: Cardiac Cath Lab - Adult (11.17.16 @ 13:49) >  CORONARY VESSELS: The coronary circulation is right dominant.  LM:   --  LM: Normal.  LAD:   --  Mid LAD: There was a 60 % stenosis. In a second lesion, there  was a 99 % stenosis. The lesion was eccentric andmoderately calcified.  --  Distal LAD: There was a diffuse 50 % stenosis. The lesion was  calcified.  --  D1: There was a 75 % stenosis in the middle third of the vessel  segment. There was a small vascular territory distal to the lesion.  CX:   --  Proximal circumflex: There was a 75 % stenosis. The lesion was  complex.  RCA:   --  Proximal RCA: There was a 100 % stenosis. There was poor  collateral blood supply to the distal myocardium.  COMPLICATIONS: No complications occurred during the cath lab visit.  DIAGNOSTIC IMPRESSIONS: Patient has severe diffsue coronary arterey disea  involving all three coronaries, no ideal for intervention or CABG.  DIAGNOSTIC RECOMMENDATIONS: Aggressive medical therapy and risk factor  modification.  Prepared and signed by  Lior Rivera M.D.    < end of copied text >    Imaging:    CXR: pleural effusions    Labs: Personally reviewed                        7.9 7.01  )-----------( 285      ( 10 Christophe 2020 06:10 )             25.8     06-10    140  |  102  |  41<H>  ----------------------------<  149<H>  3.8   |  23  |  1.93<H>    Ca    8.8      10 Christophe 2020 06:10    TPro  5.6<L>  /  Alb  3.0<L>  /  TBili  0.2  /  DBili  x   /  AST  14  /  ALT  9   /  AlkPhos  73  06-10    PT/INR - ( 10 Christophe 2020 07:45 )   PT: 13.0 SEC;   INR: 1.14       PTT - ( 10 Christophe 2020 07:45 )  PTT:43.2 SEC    CARDIAC MARKERS ( 10 Christophe 2020 07:47 )  x     / x     / x     / 60 u/L / 3.36 ng/mL / x      CARDIAC MARKERS ( 05 Jun 2020 15:00 )  x     / x     / x     / 74 u/L / 6.11 ng/mL / x      CARDIAC MARKERS ( 05 Jun 2020 05:15 )  x     / x     / x     / 89 u/L / 4.79 ng/mL / x      CARDIAC MARKERS ( 04 Jun 2020 22:05 )  x     / x     / x     / 48 u/L / 3.09 ng/mL / x        Serum Pro-Brain Natriuretic Peptide: 63205 pg/mL (06-10 @ 06:10)  Serum Pro-Brain Natriuretic Peptide: 45523 pg/mL (06-04 @ 18:54)    Assessment and Plan:    89 year old M pt with PMH of HTN, HLD, DM, known CAD with multi-vessel disease on [facility] in 2016 without lesions amenable for PCI/CABG per report presented from home with chest pressure and dyspnea.  He had recent NSTEMI, still on medical therapy.  I think his symptoms are mostly due to a CHF exacerbation, may also be related to noncompliance with anti-anginals.    Acute on chronic heart failure  - give 1 time dose of 80 mg IV  - would put on IV lasix 80 mg BID  - strict ins and outs, daily wts  - fluids and sodium restriction  - consider palliative care consult  - monitor on Tele  - replete K and Mg    CAD, recent NSTEMI  - continue home meds ASA/Plavix/metop/imdur/ranexa  - trops continue to downtrend, one check one more set of enzymes in 6 hours    For all Cardiology service contact information, go to [email or web address] and use "[email or web address]" to login.

## 2020-06-10 NOTE — CONSULT NOTE ADULT - SUBJECTIVE AND OBJECTIVE BOX
NEPHROLOGY - NSN    Patient seen and examined.    HPI:  89 y.o M pmh HTN. DM2 not on insulin, CKD3, CAD c TVD not amenable to surgery or PCI, Chronic systolic CHF (sever LV Dysfunction), S/P PPM (medtronic) and discharged 6/9 after admission for NSTEMI presents with chest pain. Patient states he was admitted in the hospital from 6/4-6/9, during that admission cardiology followed the patient for NSTEMI and advised medical management and initiated Ranexa. Patient states that this AM had mid sternal chest pressure 7/10, non-radiating, lasted for a "long time", pain very similar to pain from previous admission. Patient did not take his medications when he got home last night, unsure if he got them before being discharged.  At this time patient is pain free. Denies fever, chills, nausea, vomiting, abd pain, brbpr, melena, dysuria, hematuria, weakness, visual change. (10 Christophe 2020 11:27)      PAST MEDICAL & SURGICAL HISTORY:  History of bradycardia: s/p medtronic PPM  Congestive heart failure  Essential hypertension  DM (diabetes mellitus)  CAD (coronary artery disease): triple vessel disease  History of permanent cardiac pacemaker placement: Medtronic  H/O coronary angioplasty: last 2016, not amenable to PCI or CABG      MEDICATIONS  (STANDING):  aspirin enteric coated 81 milliGRAM(s) Oral daily  cholecalciferol 1000 Unit(s) Oral daily  clopidogrel Tablet 75 milliGRAM(s) Oral daily  dextrose 5%. 1000 milliLiter(s) (50 mL/Hr) IV Continuous <Continuous>  dextrose 50% Injectable 12.5 Gram(s) IV Push once  dextrose 50% Injectable 25 Gram(s) IV Push once  dextrose 50% Injectable 25 Gram(s) IV Push once  escitalopram 5 milliGRAM(s) Oral daily  folic acid 1 milliGRAM(s) Oral daily  furosemide   Injectable 80 milliGRAM(s) IV Push two times a day  heparin   Injectable 5000 Unit(s) SubCutaneous three times a day  insulin lispro (HumaLOG) corrective regimen sliding scale   SubCutaneous three times a day before meals  insulin lispro (HumaLOG) corrective regimen sliding scale   SubCutaneous at bedtime  isosorbide   mononitrate ER Tablet (IMDUR) 120 milliGRAM(s) Oral daily  metoprolol tartrate 25 milliGRAM(s) Oral two times a day  pantoprazole    Tablet 40 milliGRAM(s) Oral before breakfast  ranolazine 500 milliGRAM(s) Oral two times a day  simvastatin 40 milliGRAM(s) Oral at bedtime      Allergies    No Known Allergies    Intolerances        SOCIAL HISTORY:  Denies alcohol abuse, drug abuse or tobacco usage.     FAMILY HISTORY:  Family history of coronary artery disease  Family history of diabetes mellitus      VITALS:  T(C): 36.4 (06-10-20 @ 14:49), Max: 36.8 (06-10-20 @ 05:45)  HR: 81 (06-10-20 @ 14:49) (75 - 95)  BP: 141/79 (06-10-20 @ 14:49) (123/83 - 141/83)  RR: 18 (06-10-20 @ 14:49) (16 - 19)  SpO2: 100% (06-10-20 @ 14:49) (98% - 100%)    REVIEW OF SYSTEMS:  Denies any nausea, vomiting, diarrhea, fever or chills. s. All other pertinent systems are reviewed and are negative.    PHYSICAL EXAM:  Constitutional: NAD  HEENT: EOMI  Neck:  No JVD, supple   Respiratory: CTA B/L  Cardiovascular: S1 and S2, RRR  Gastrointestinal: + BS, soft, NT, ND  Extremities: No peripheral edema, + peripheral pulses  Neurological: A/O x 3, CN2-12 intact  Psychiatric: Normal mood, normal affect  : No Vickers  Skin: No rashes, C/D/I  Access: Not applicable    I and O's:    06-10 @ 07:01  -  06-10 @ 18:33  --------------------------------------------------------  IN: 0 mL / OUT: 275 mL / NET: -275 mL      Height (cm): 160.02 (06-10 @ 05:00)    LABS:                        7.9    7.01  )-----------( 285      ( 10 Christophe 2020 06:10 )             25.8     06-10    140  |  102  |  41<H>  ----------------------------<  149<H>  3.8   |  23  |  1.93<H>    Ca    8.8      10 Christophe 2020 06:10    TPro  5.6<L>  /  Alb  3.0<L>  /  TBili  0.2  /  DBili  x   /  AST  14  /  ALT  9   /  AlkPhos  73  06-10      RADIOLOGY & ADDITIONAL STUDIES:  < from: Xray Chest 1 View AP/PA (06.10.20 @ 05:26) >    EXAM:  XR CHEST AP OR PA 1V        PROCEDURE DATE:  Christophe 10 2020         INTERPRETATION:  CLINICAL INFORMATION: Chest pain.    EXAM: Frontal radiograph of the chest.    COMPARISON: Chest x-ray 6/4/2020.    FINDINGS:    Lines and Tubes: Left chest wall pacemaker.    Lungs: Bilateral lower lung haziness likely small effusions with underlying atelectasis. No focal consolidations.    Pleura: Bilateral pleural effusions.    Mediastinum: Heart size cannot be assessed on this view.    Skeletal: Unremarkable.      IMPRESSION:  Bilateral small pleural effusions with associated atelectasis, unchanged since 6/4/2020.              CYNDY THOMAS M.D., RADIOLOGY RESIDENT  This document has been electronically signed.  CHIO SCHUSTER M.D., ATTENDING RADIOLOGIST  This document has been electronically signed. Christophe 10 2020  7:39AM                  < end of copied text > NEPHROLOGY - NSN    Patient seen and examined.    HPI:  89 y.o M pmh HTN. DM2 not on insulin, CKD3, CAD c TVD not amenable to surgery or PCI, Chronic systolic CHF (sever LV Dysfunction), S/P PPM (medtronic) and discharged 6/9 after admission for NSTEMI presents with chest pain. Patient states he was admitted in the hospital from 6/4-6/9, during that admission cardiology followed the patient for NSTEMI and advised medical management and initiated Ranexa. Patient states that this AM had mid sternal chest pressure 7/10, non-radiating, lasted for a "long time", pain very similar to pain from previous admission. Patient did not take his medications when he got home last night, unsure if he got them before being discharged.  At this time patient is pain free. Denies fever, chills, nausea, vomiting, abd pain, brbpr, melena, dysuria, hematuria, weakness, visual change. (10 Christophe 2020 11:27)  Pt is not a great historian and he was unable to provide an accurate hx  There is no hematuria or bubbles in the urine.  No history of NSAIDS or nephrolithisis.  The patient urinates once or twice in the night and there is no incontinence.  No family hx or renal disease or back pain.    No recent abx use.  No alleviating or aggravating factors with respect to the kidneys.       PAST MEDICAL & SURGICAL HISTORY:  History of bradycardia: s/p medtronic PPM  Congestive heart failure  Essential hypertension  DM (diabetes mellitus)  CAD (coronary artery disease): triple vessel disease  History of permanent cardiac pacemaker placement: Medtronic  H/O coronary angioplasty: last 2016, not amenable to PCI or CABG      MEDICATIONS  (STANDING):  aspirin enteric coated 81 milliGRAM(s) Oral daily  cholecalciferol 1000 Unit(s) Oral daily  clopidogrel Tablet 75 milliGRAM(s) Oral daily  dextrose 5%. 1000 milliLiter(s) (50 mL/Hr) IV Continuous <Continuous>  dextrose 50% Injectable 12.5 Gram(s) IV Push once  dextrose 50% Injectable 25 Gram(s) IV Push once  dextrose 50% Injectable 25 Gram(s) IV Push once  escitalopram 5 milliGRAM(s) Oral daily  folic acid 1 milliGRAM(s) Oral daily  furosemide   Injectable 80 milliGRAM(s) IV Push two times a day  heparin   Injectable 5000 Unit(s) SubCutaneous three times a day  insulin lispro (HumaLOG) corrective regimen sliding scale   SubCutaneous three times a day before meals  insulin lispro (HumaLOG) corrective regimen sliding scale   SubCutaneous at bedtime  isosorbide   mononitrate ER Tablet (IMDUR) 120 milliGRAM(s) Oral daily  metoprolol tartrate 25 milliGRAM(s) Oral two times a day  pantoprazole    Tablet 40 milliGRAM(s) Oral before breakfast  ranolazine 500 milliGRAM(s) Oral two times a day  simvastatin 40 milliGRAM(s) Oral at bedtime      Allergies    No Known Allergies    Intolerances        SOCIAL HISTORY:  Denies alcohol abuse, drug abuse or tobacco usage.     FAMILY HISTORY:  Family history of coronary artery disease  Family history of diabetes mellitus      VITALS:  T(C): 36.4 (06-10-20 @ 14:49), Max: 36.8 (06-10-20 @ 05:45)  HR: 81 (06-10-20 @ 14:49) (75 - 95)  BP: 141/79 (06-10-20 @ 14:49) (123/83 - 141/83)  RR: 18 (06-10-20 @ 14:49) (16 - 19)  SpO2: 100% (06-10-20 @ 14:49) (98% - 100%)    REVIEW OF SYSTEMS:  Denies any nausea, vomiting, diarrhea, fever or chills. s. All other pertinent systems are reviewed and are negative.    PHYSICAL EXAM:  Constitutional: NAD  HEENT: EOMI  Neck:  No JVD, supple   Respiratory: CTA B/L  Cardiovascular: S1 and S2, RRR  Gastrointestinal: + BS, soft, NT, ND  Extremities: No peripheral edema, + peripheral pulses  Neurological: A/O x 3, CN2-12 intact  Psychiatric: Normal mood, normal affect  : No Vickers  Skin: No rashes, C/D/I  Access: Not applicable    I and O's:    06-10 @ 07:01  -  06-10 @ 18:33  --------------------------------------------------------  IN: 0 mL / OUT: 275 mL / NET: -275 mL      Height (cm): 160.02 (06-10 @ 05:00)    LABS:                        7.9    7.01  )-----------( 285      ( 10 Christophe 2020 06:10 )             25.8     06-10    140  |  102  |  41<H>  ----------------------------<  149<H>  3.8   |  23  |  1.93<H>    Ca    8.8      10 Christophe 2020 06:10    TPro  5.6<L>  /  Alb  3.0<L>  /  TBili  0.2  /  DBili  x   /  AST  14  /  ALT  9   /  AlkPhos  73  06-10      RADIOLOGY & ADDITIONAL STUDIES:  < from: Xray Chest 1 View AP/PA (06.10.20 @ 05:26) >    EXAM:  XR CHEST AP OR PA 1V        PROCEDURE DATE:  Christophe 10 2020         INTERPRETATION:  CLINICAL INFORMATION: Chest pain.    EXAM: Frontal radiograph of the chest.    COMPARISON: Chest x-ray 6/4/2020.    FINDINGS:    Lines and Tubes: Left chest wall pacemaker.    Lungs: Bilateral lower lung haziness likely small effusions with underlying atelectasis. No focal consolidations.    Pleura: Bilateral pleural effusions.    Mediastinum: Heart size cannot be assessed on this view.    Skeletal: Unremarkable.      IMPRESSION:  Bilateral small pleural effusions with associated atelectasis, unchanged since 6/4/2020.

## 2020-06-11 NOTE — PROGRESS NOTE ADULT - SUBJECTIVE AND OBJECTIVE BOX
NEPHROLOGY-[facility] [phone]        Patient seen and examined in bed.  He was about the same   No chest pain         MEDICATIONS  (STANDING):  aspirin enteric coated 81 milliGRAM(s) Oral daily  cholecalciferol 1000 Unit(s) Oral daily  clopidogrel Tablet 75 milliGRAM(s) Oral daily  dextrose 5%. 1000 milliLiter(s) (50 mL/Hr) IV Continuous <Continuous>  dextrose 50% Injectable 12.5 Gram(s) IV Push once  dextrose 50% Injectable 25 Gram(s) IV Push once  dextrose 50% Injectable 25 Gram(s) IV Push once  escitalopram 5 milliGRAM(s) Oral daily  folic acid 1 milliGRAM(s) Oral daily  furosemide   Injectable 80 milliGRAM(s) IV Push two times a day  heparin   Injectable 5000 Unit(s) SubCutaneous three times a day  insulin lispro (HumaLOG) corrective regimen sliding scale   SubCutaneous three times a day before meals  insulin lispro (HumaLOG) corrective regimen sliding scale   SubCutaneous at bedtime  isosorbide   mononitrate ER Tablet (IMDUR) 120 milliGRAM(s) Oral daily  magnesium oxide 400 milliGRAM(s) Oral three times a day with meals  metoprolol tartrate 25 milliGRAM(s) Oral two times a day  pantoprazole    Tablet 40 milliGRAM(s) Oral before breakfast  ranolazine 500 milliGRAM(s) Oral two times a day  simvastatin 40 milliGRAM(s) Oral at bedtime      VITAL:  T(C): , Max: 36.6 (06-10-20 @ 09:27)  T(F): , Max: 97.9 (06-10-20 @ 09:27)  HR: 80 (06-11-20 @ 05:53)  BP: 137/72 (06-11-20 @ 05:53)  BP(mean): --  RR: 19 (06-11-20 @ 05:53)  SpO2: 94% (06-11-20 @ 05:53)  Wt(kg): --    I and O's:    06-10 @ 07:01  -  06-11 @ 07:00  --------------------------------------------------------  IN: 0 mL / OUT: 2350 mL / NET: -2350 mL          PHYSICAL EXAM:    Constitutional: NAD  Neck:  No JVD  Respiratory: CTAB/L  Cardiovascular: S1 and S2  Gastrointestinal: BS+, soft, NT/ND  Extremities: No peripheral edema  Neurological: A/O x 3, no focal deficits  Psychiatric: Normal mood, normal affect  : No Vickers  Skin: No rashes  Access: Not applicable    LABS:                        8.6    8.09  )-----------( 298      ( 11 Jun 2020 05:20 )             27.8     06-11    138  |  99  |  37<H>  ----------------------------<  164<H>  4.0   |  23  |  1.62<H>    Ca    8.9      11 Jun 2020 05:20  Phos  2.9     06-11  Mg     1.7     06-11    TPro  5.6<L>  /  Alb  3.0<L>  /  TBili  0.2  /  DBili  x   /  AST  14  /  ALT  9   /  AlkPhos  73  06-10          Urine Studies:          RADIOLOGY & ADDITIONAL STUDIES:

## 2020-06-11 NOTE — DIETITIAN INITIAL EVALUATION ADULT. - PERTINENT LABORATORY DATA
06-11 Na138 mmol/L Glu 164 mg/dL<H> K+ 4.0 mmol/L Cr  1.62 mg/dL<H> BUN 37 mg/dL<H> 06-11 Phos 2.9 mg/dL 06-10 Alb 3.0 g/dL<L> 06-05 Chol 133 mg/dL LDL 63 mg/dL HDL 63 mg/dL<H> Trig 108 mg/dL

## 2020-06-11 NOTE — DIETITIAN INITIAL EVALUATION ADULT. - ADD RECOMMEND
1- Continue current diet order, which remains appropriate at this time. Add Glucerna Therapeutic Nutrition Shake 240mls 3x daily (660kcals, 30g protein) 2- Monitor weights, labs, BM's, skin integrity, p.o. intake. 3- Please Encourage po intake, assist with meals and menu selections, provide alternatives PRN. 4- RD remains available, re-consult as needed. Guillermina Will, MS, RDN Pager #[phone]

## 2020-06-11 NOTE — PROVIDER CONTACT NOTE (CRITICAL VALUE NOTIFICATION) - ASSESSMENT
patient AxOx4, denies chest pain or SOB throughout the shift, no acute distress noted. provider made aware of elevated troponin level. safety maintained and will continue to monitor

## 2020-06-11 NOTE — PROGRESS NOTE ADULT - SUBJECTIVE AND OBJECTIVE BOX
Patient is a 89y old  Male who presents with a chief complaint of Chest pain (11 Jun 2020 08:38)      INTERVAL HPI/OVERNIGHT EVENTS:    MEDICATIONS  (STANDING):  aspirin enteric coated 81 milliGRAM(s) Oral daily  cholecalciferol 1000 Unit(s) Oral daily  clopidogrel Tablet 75 milliGRAM(s) Oral daily  dextrose 5%. 1000 milliLiter(s) (50 mL/Hr) IV Continuous <Continuous>  dextrose 50% Injectable 12.5 Gram(s) IV Push once  dextrose 50% Injectable 25 Gram(s) IV Push once  dextrose 50% Injectable 25 Gram(s) IV Push once  escitalopram 5 milliGRAM(s) Oral daily  folic acid 1 milliGRAM(s) Oral daily  furosemide   Injectable 80 milliGRAM(s) IV Push two times a day  heparin   Injectable 5000 Unit(s) SubCutaneous three times a day  insulin lispro (HumaLOG) corrective regimen sliding scale   SubCutaneous three times a day before meals  insulin lispro (HumaLOG) corrective regimen sliding scale   SubCutaneous at bedtime  isosorbide   mononitrate ER Tablet (IMDUR) 120 milliGRAM(s) Oral daily  magnesium oxide 400 milliGRAM(s) Oral three times a day with meals  metoprolol tartrate 25 milliGRAM(s) Oral two times a day  pantoprazole    Tablet 40 milliGRAM(s) Oral before breakfast  ranolazine 500 milliGRAM(s) Oral two times a day  simvastatin 40 milliGRAM(s) Oral at bedtime    MEDICATIONS  (PRN):  dextrose 40% Gel 15 Gram(s) Oral once PRN Blood Glucose LESS THAN 70 milliGRAM(s)/deciliter  glucagon  Injectable 1 milliGRAM(s) IntraMuscular once PRN Glucose LESS THAN 70 milligrams/deciliter  nitroglycerin     SubLingual 0.4 milliGRAM(s) SubLingual every 5 minutes PRN Chest Pain        )      Allergies    No Known Allergies    Intolerances        REVIEW OF SYSTEMS:  CARDIOVASCULAR: No chest pain, palpitations, dizziness, or leg swelling; no shortness of breath     RESPIRATORY: No cough, wheezing, chills or hemoptysis; No shortness of breath    GASTROINTESTINAL: No abdominal or epigastric pain. No nausea, vomiting, or hematemesis; No diarrhea or constipation. No melena or hematochezia.    NEUROLOGICAL: No headaches, memory loss, loss of strength, numbness      PHYSICAL EXAM:  Vital Signs Last 24 Hrs  T(C): 36.2 (11 Jun 2020 05:53), Max: 36.6 (10 Christophe 2020 23:56)  T(F): 97.2 (11 Jun 2020 05:53), Max: 97.9 (10 Christophe 2020 23:56)  HR: 80 (11 Jun 2020 05:53) (70 - 88)  BP: 137/72 (11 Jun 2020 05:53) (112/64 - 141/79)  BP(mean): --  RR: 19 (11 Jun 2020 05:53) (16 - 20)  SpO2: 94% (11 Jun 2020 05:53) (94% - 100%)    GENERAL: NAD, well-groomed, well-developed  HEAD:  Atraumatic, Normocephalic  EYES: EOMI, PERRLA, conjunctiva and sclera clear  NECK: Supple, No JVD, Normal thyroid  NERVOUS SYSTEM:  Alert & Oriented X3, Good concentration;  and symmetric  CHEST/LUNG: Clear to auscultation bilaterally; No rales, rhonchi, wheezing, or rubs  HEART: split S1S2s3  regular, without murmur, rub  ABDOMEN: Soft, Nontender, Nondistended; Bowel sounds present  EXTREMITIES:  2+ Peripheral Pulses, No clubbing, cyanosis, or edema      LABS:                        8.6    8.09  )-----------( 298      ( 11 Jun 2020 05:20 )             27.8     11 Jun 2020 05:20    138    |  99     |  37     ----------------------------<  164    4.0     |  23     |  1.62     Ca    8.9        11 Jun 2020 05:20  Phos  2.9       11 Jun 2020 05:20  Mg     1.7       11 Jun 2020 05:20      PT/INR - ( 10 Christophe 2020 07:45 )   PT: 13.0 SEC;   INR: 1.14          PTT - ( 10 Christophe 2020 07:45 )  PTT:43.2 SEC  CAPILLARY BLOOD GLUCOSE      POCT Blood Glucose.: 196 mg/dL (11 Jun 2020 07:52)  POCT Blood Glucose.: 193 mg/dL (10 Christophe 2020 22:13)  POCT Blood Glucose.: 285 mg/dL (10 Christophe 2020 17:21)  POCT Blood Glucose.: 141 mg/dL (10 Christophe 2020 15:37)    :  Assessment and Plan:   · Assessment		  89-year old gentleman with a past medical history of chronic systolic congestive heart failure, triple-vessel coronary artery disease, history of coronary angioplasty last 2016, not amenable to PCI or CABG, DM2, HTN, CKD3,  now admitted with recurrent  CP (?Due to non-compliance?)    · ACS   cont medical management for CAD .. nt candidate for aggressie treatment   can change Metoprolol to 25 bid.  Nitrates and ranexa.  Started on epogen to increase H/H, with better oxygen carrying capacity      Chronic systolic CHF (congestive heart failure).  Plan:  Continue to diurese       overall comfortable on NC       ·  Problem: DM (diabetes mellitus).  Plan: Insulin sliding scale    ·  Problem: Essential hypertension.  Plan: monitor   cont meds See above.      D/c planning       pt had MOLST form  last admission       Attending Attestation:   40 minutes spent on total encounter; more than 50% of the visit was spent counseling and/or coordinating care by the attending physician.          Care Discussed with Consultants/Other Providers: nephrology, cardiology

## 2020-06-11 NOTE — DIETITIAN INITIAL EVALUATION ADULT. - OTHER INFO
Nutrition consult received for assessment 2/2 "protein deficiency"     Patient is a 89M with Hx of chronic systolic congestive heart failure, triple-vessel coronary artery disease, history of coronary angioplasty last 2016, DM2, HTN, CKD3, admitted with CP.  Unable to conduct in-person interview or nutrition-focused physical exam due to COVID19 contact precautions to mitigate spread of illness, pt is COVID negative. No GI distress (nausea/vomiting/diarrhea/constipation) last BM 6/10.  No reported difficulties chewing and swallowing foods and fluids.  Pt noted with recent admission, RDN note from 2/26 noted.  Pt was 62.8 kgs 2/19 ---> currently 60.1 kgs, which is suggestive of weight loss. Weight loss may be further masked by 3+ generalized edema; edema of the right left leg, knee and ankle. Furthermore, edema may be contributory to low albumin/protein.    Consider the provision of Glucerna Therapeutic Nutrition Shake 240mls 3x daily (660kcals, 30g protein). Please obtain daily weight and document % PO intake as feasible to assess adequacy of PO.

## 2020-06-11 NOTE — PROGRESS NOTE ADULT - ASSESSMENT
89 y.o M pmh HTN. DM2 not on insulin, CKD3, CAD c TVD not amenable to surgery or PCI, Chronic systolic CHF (sever LV Dysfunction), S/P PPM (medtronic) and discharged 6/9 after admission for NSTEMI presents with chest pain  CKD stage 4 at present.  Given the age the exact etiology is not important  Anemia needs to be fixed in order help with sx of chest pain   Secondary hyperparathyroidism     1 Renal - Cont with the IV lasix for now but volume status seems to be improving as there was no JVD  No need to repeat renal sono  Check PTH and Phos  No need for Rocaltrol at present      2 Anemia-   retacrit again in am and the HH is improving     3 CVS-At present not with chest pain.  Defer to cards re increasing Lopressor for lowering heart rate   ?Add Hydralazine to lower BP

## 2020-06-11 NOTE — DIETITIAN INITIAL EVALUATION ADULT. - PROBLEM SELECTOR PLAN 3
Recent admit for NSTEMI with medical management  COntinue home medications including anti-anginals.  pt with known three vessel CAD, not amenable to intervention  will try s/l ntg for persistent pain

## 2020-06-11 NOTE — PROGRESS NOTE ADULT - SUBJECTIVE AND OBJECTIVE BOX
Patient seen and examined at bedside.    Overnight Events:  he is off oxygen, feels better    ROS: dyspnea resolving, no chest pain    Current Meds:  aspirin enteric coated 81 milliGRAM(s) Oral daily  cholecalciferol 1000 Unit(s) Oral daily  clopidogrel Tablet 75 milliGRAM(s) Oral daily  dextrose 40% Gel 15 Gram(s) Oral once PRN  dextrose 5%. 1000 milliLiter(s) IV Continuous <Continuous>  dextrose 50% Injectable 12.5 Gram(s) IV Push once  dextrose 50% Injectable 25 Gram(s) IV Push once  dextrose 50% Injectable 25 Gram(s) IV Push once  escitalopram 5 milliGRAM(s) Oral daily  folic acid 1 milliGRAM(s) Oral daily  furosemide   Injectable 80 milliGRAM(s) IV Push two times a day  glucagon  Injectable 1 milliGRAM(s) IntraMuscular once PRN  heparin   Injectable 5000 Unit(s) SubCutaneous three times a day  insulin lispro (HumaLOG) corrective regimen sliding scale   SubCutaneous three times a day before meals  insulin lispro (HumaLOG) corrective regimen sliding scale   SubCutaneous at bedtime  isosorbide   mononitrate ER Tablet (IMDUR) 120 milliGRAM(s) Oral daily  magnesium oxide 400 milliGRAM(s) Oral three times a day with meals  metoprolol tartrate 25 milliGRAM(s) Oral two times a day  nitroglycerin     SubLingual 0.4 milliGRAM(s) SubLingual every 5 minutes PRN  pantoprazole    Tablet 40 milliGRAM(s) Oral before breakfast  ranolazine 500 milliGRAM(s) Oral two times a day  simvastatin 40 milliGRAM(s) Oral at bedtime      Vitals:  T(F): 97.2 (06-11), Max: 97.9 (06-10)  HR: 80 (06-11) (70 - 88)  BP: 137/72 (06-11) (112/64 - 141/79)  RR: 19 (06-11)  SpO2: 94% (06-11)  I&O's Summary    10 Christophe 2020 07:01  -  11 Jun 2020 07:00  --------------------------------------------------------  IN: 0 mL / OUT: 2350 mL / NET: -2350 mL    GENERAL: No acute distress, elderly frail, exhausted  HEAD:  Atraumatic, Normocephalic  ENT: EOMI, Neck supple, + 10 cm JVD, moist mucosa  CHEST/LUNG: Decreased breath sounds in lower lung fields, crackles   BACK: No spinal tenderness  HEART: Regular rate and rhythm; No murmurs, rubs, or gallops  ABDOMEN: Soft, Nontender, Nondistended; Bowel sounds present  EXTREMITIES:  No clubbing, cyanosis, or edema  PSYCH: Nl behavior, nl affect  NEUROLOGY: AAOx3, non-focal,  SKIN: Normal color, No rashes or lesions                        8.6    8.09  )-----------( 298      ( 11 Jun 2020 05:20 )             27.8     06-11    138  |  99  |  37<H>  ----------------------------<  164<H>  4.0   |  23  |  1.62<H>    Ca    8.9      11 Jun 2020 05:20  Phos  2.9     06-11  Mg     1.7     06-11    TPro  5.6<L>  /  Alb  3.0<L>  /  TBili  0.2  /  DBili  x   /  AST  14  /  ALT  9   /  AlkPhos  73  06-10    PT/INR - ( 10 Christophe 2020 07:45 )   PT: 13.0 SEC;   INR: 1.14          PTT - ( 10 Christophe 2020 07:45 )  PTT:43.2 SEC  CARDIAC MARKERS ( 10 Christophe 2020 07:47 )  x     / x     / x     / 60 u/L / 3.36 ng/mL / x      CARDIAC MARKERS ( 05 Jun 2020 15:00 )  x     / x     / x     / 74 u/L / 6.11 ng/mL / x      CARDIAC MARKERS ( 05 Jun 2020 05:15 )  x     / x     / x     / 89 u/L / 4.79 ng/mL / x      CARDIAC MARKERS ( 04 Jun 2020 22:05 )  x     / x     / x     / 48 u/L / 3.09 ng/mL / x        Serum Pro-Brain Natriuretic Peptide: 49863 pg/mL (06-10 @ 06:10)  Serum Pro-Brain Natriuretic Peptide: 07482 pg/mL (06-04 @ 18:54)    Cardiovascular Diagnostic Testing:    ECG: pending    Echo:  < from: Transthoracic Echocardiogram (02.19.20 @ 12:19) >  CONCLUSIONS:  1. Tethered mitral valve leaflets with normal opening.  Moderate-severe mitral regurgitation.  2. Calcified trileaflet aortic valve with normal opening.  Mild aortic regurgitation.  3. Severe global left ventricular systolic dysfunction.  4. Right ventricular enlargement with decreased right  ventricular systolic function.  5. Normal tricuspid valve. Moderate tricuspid  regurgitation.  6. Estimated pulmonary artery systolic pressure equals 50  mm Hg, assuming right atrial pressure equals 10  mm Hg,  consistent with moderate pulmonary hypertension.  ------------------------------------------------------------------------  Confirmed on  2/19/2020 - 13:12:47 by Jada Burns MD    < end of copied text >    Cath:  < from: Cardiac Cath Lab - Adult (11.17.16 @ 13:49) >  CORONARY VESSELS: The coronary circulation is right dominant.  LM:   --  LM: Normal.  LAD:   --  Mid LAD: There was a 60 % stenosis. In a second lesion, there  was a 99 % stenosis. The lesion was eccentric andmoderately calcified.  --  Distal LAD: There was a diffuse 50 % stenosis. The lesion was  calcified.  --  D1: There was a 75 % stenosis in the middle third of the vessel  segment. There was a small vascular territory distal to the lesion.  CX:   --  Proximal circumflex: There was a 75 % stenosis. The lesion was  complex.  RCA:   --  Proximal RCA: There was a 100 % stenosis. There was poor  collateral blood supply to the distal myocardium.  COMPLICATIONS: No complications occurred during the cath lab visit.  DIAGNOSTIC IMPRESSIONS: Patient has severe diffsue coronary arterey disea  involving all three coronaries, no ideal for intervention or CABG.  DIAGNOSTIC RECOMMENDATIONS: Aggressive medical therapy and risk factor  modification.  Prepared and signed by  Lior Rivera M.D.    Interpretation of Telemetry: sinus 70-80s with PVCs    Assessment and Plan:    89 year old M pt with PMH of HTN, HLD, DM, known CAD with multi-vessel disease on [facility] in 2016 without lesions amenable for PCI/CABG per report presented from home with chest pressure and dyspnea.  He had recent NSTEMI, still on medical therapy.  I think his symptoms are mostly due to a CHF exacerbation, may also be related to noncompliance with anti-anginals.    Acute on chronic heart failure, feeling better with diuresis but still overloaded  - continue IV lasix 80 mg BID, will titrate down tomorrow  - strict ins and outs, daily wts  - fluids and sodium restriction  - consider palliative care consult  - monitor on Tele  - replete K and Mg    CAD, recent NSTEMI  - continue home meds ASA/Plavix/metop/imdur/ranexa  - no need to trend enzymes further    Gabriel Porter MD  Cardiology Fellow   [phone]  For all other Cardiology service contact information, go to [email or web address] and use "[email or web address]" to login. Patient seen and examined at bedside.    Overnight Events:  he is off oxygen, feels better    ROS: dyspnea resolving, no chest pain    Current Meds:  aspirin enteric coated 81 milliGRAM(s) Oral daily  cholecalciferol 1000 Unit(s) Oral daily  clopidogrel Tablet 75 milliGRAM(s) Oral daily  dextrose 40% Gel 15 Gram(s) Oral once PRN  dextrose 5%. 1000 milliLiter(s) IV Continuous <Continuous>  dextrose 50% Injectable 12.5 Gram(s) IV Push once  dextrose 50% Injectable 25 Gram(s) IV Push once  dextrose 50% Injectable 25 Gram(s) IV Push once  escitalopram 5 milliGRAM(s) Oral daily  folic acid 1 milliGRAM(s) Oral daily  furosemide   Injectable 80 milliGRAM(s) IV Push two times a day  glucagon  Injectable 1 milliGRAM(s) IntraMuscular once PRN  heparin   Injectable 5000 Unit(s) SubCutaneous three times a day  insulin lispro (HumaLOG) corrective regimen sliding scale   SubCutaneous three times a day before meals  insulin lispro (HumaLOG) corrective regimen sliding scale   SubCutaneous at bedtime  isosorbide   mononitrate ER Tablet (IMDUR) 120 milliGRAM(s) Oral daily  magnesium oxide 400 milliGRAM(s) Oral three times a day with meals  metoprolol tartrate 25 milliGRAM(s) Oral two times a day  nitroglycerin     SubLingual 0.4 milliGRAM(s) SubLingual every 5 minutes PRN  pantoprazole    Tablet 40 milliGRAM(s) Oral before breakfast  ranolazine 500 milliGRAM(s) Oral two times a day  simvastatin 40 milliGRAM(s) Oral at bedtime      Vitals:  T(F): 97.2 (06-11), Max: 97.9 (06-10)  HR: 80 (06-11) (70 - 88)  BP: 137/72 (06-11) (112/64 - 141/79)  RR: 19 (06-11)  SpO2: 94% (06-11)  I&O's Summary    10 Christophe 2020 07:01  -  11 Jun 2020 07:00  --------------------------------------------------------  IN: 0 mL / OUT: 2350 mL / NET: -2350 mL    GENERAL: No acute distress, elderly frail, exhausted  HEAD:  Atraumatic, Normocephalic  ENT: EOMI, Neck supple, + 10 cm JVD, moist mucosa  CHEST/LUNG: Decreased breath sounds in lower lung fields, crackles   BACK: No spinal tenderness  HEART: Regular rate and rhythm; No murmurs, rubs, or gallops  ABDOMEN: Soft, Nontender, Nondistended; Bowel sounds present  EXTREMITIES:  No clubbing, cyanosis, 2+ pitting edema  PSYCH: Nl behavior, nl affect  NEUROLOGY: AAOx3, non-focal,  SKIN: Normal color, No rashes or lesions                        8.6    8.09  )-----------( 298      ( 11 Jun 2020 05:20 )             27.8     06-11    138  |  99  |  37<H>  ----------------------------<  164<H>  4.0   |  23  |  1.62<H>    Ca    8.9      11 Jun 2020 05:20  Phos  2.9     06-11  Mg     1.7     06-11    TPro  5.6<L>  /  Alb  3.0<L>  /  TBili  0.2  /  DBili  x   /  AST  14  /  ALT  9   /  AlkPhos  73  06-10    PT/INR - ( 10 Christophe 2020 07:45 )   PT: 13.0 SEC;   INR: 1.14          PTT - ( 10 Christophe 2020 07:45 )  PTT:43.2 SEC  CARDIAC MARKERS ( 10 Christophe 2020 07:47 )  x     / x     / x     / 60 u/L / 3.36 ng/mL / x      CARDIAC MARKERS ( 05 Jun 2020 15:00 )  x     / x     / x     / 74 u/L / 6.11 ng/mL / x      CARDIAC MARKERS ( 05 Jun 2020 05:15 )  x     / x     / x     / 89 u/L / 4.79 ng/mL / x      CARDIAC MARKERS ( 04 Jun 2020 22:05 )  x     / x     / x     / 48 u/L / 3.09 ng/mL / x        Serum Pro-Brain Natriuretic Peptide: 91582 pg/mL (06-10 @ 06:10)  Serum Pro-Brain Natriuretic Peptide: 54083 pg/mL (06-04 @ 18:54)    Cardiovascular Diagnostic Testing:    ECG: pending    Echo:  < from: Transthoracic Echocardiogram (02.19.20 @ 12:19) >  CONCLUSIONS:  1. Tethered mitral valve leaflets with normal opening.  Moderate-severe mitral regurgitation.  2. Calcified trileaflet aortic valve with normal opening.  Mild aortic regurgitation.  3. Severe global left ventricular systolic dysfunction.  4. Right ventricular enlargement with decreased right  ventricular systolic function.  5. Normal tricuspid valve. Moderate tricuspid  regurgitation.  6. Estimated pulmonary artery systolic pressure equals 50  mm Hg, assuming right atrial pressure equals 10  mm Hg,  consistent with moderate pulmonary hypertension.  ------------------------------------------------------------------------  Confirmed on  2/19/2020 - 13:12:47 by Jada Burns MD    < end of copied text >    Cath:  < from: Cardiac Cath Lab - Adult (11.17.16 @ 13:49) >  CORONARY VESSELS: The coronary circulation is right dominant.  LM:   --  LM: Normal.  LAD:   --  Mid LAD: There was a 60 % stenosis. In a second lesion, there  was a 99 % stenosis. The lesion was eccentric andmoderately calcified.  --  Distal LAD: There was a diffuse 50 % stenosis. The lesion was  calcified.  --  D1: There was a 75 % stenosis in the middle third of the vessel  segment. There was a small vascular territory distal to the lesion.  CX:   --  Proximal circumflex: There was a 75 % stenosis. The lesion was  complex.  RCA:   --  Proximal RCA: There was a 100 % stenosis. There was poor  collateral blood supply to the distal myocardium.  COMPLICATIONS: No complications occurred during the cath lab visit.  DIAGNOSTIC IMPRESSIONS: Patient has severe diffsue coronary arterey disea  involving all three coronaries, no ideal for intervention or CABG.  DIAGNOSTIC RECOMMENDATIONS: Aggressive medical therapy and risk factor  modification.  Prepared and signed by  Lior Rivera M.D.    Interpretation of Telemetry: sinus 70-80s with PVCs    Assessment and Plan:    89 year old M pt with PMH of HTN, HLD, DM, known CAD with multi-vessel disease on [facility] in 2016 without lesions amenable for PCI/CABG per report presented from home with chest pressure and dyspnea.  He had recent NSTEMI, still on medical therapy.  I think his symptoms are mostly due to a CHF exacerbation, may also be related to noncompliance with anti-anginals.    Acute on chronic heart failure, feeling better with diuresis but still overloaded  - continue IV lasix 80 mg BID, will titrate down tomorrow  - strict ins and outs, daily wts  - fluids and sodium restriction  - consider palliative care consult  - monitor on Tele  - replete K and Mg    CAD, recent NSTEMI  - continue home meds ASA/Plavix/metop/imdur/ranexa  - no need to trend enzymes further    Gabriel Porter MD  Cardiology Fellow   [phone]  For all other Cardiology service contact information, go to [email or web address] and use "[email or web address]" to login.

## 2020-06-11 NOTE — DIETITIAN INITIAL EVALUATION ADULT. - PERTINENT MEDS FT
MEDICATIONS  (STANDING):  aspirin enteric coated 81 milliGRAM(s) Oral daily  cholecalciferol 1000 Unit(s) Oral daily  clopidogrel Tablet 75 milliGRAM(s) Oral daily  dextrose 5%. 1000 milliLiter(s) (50 mL/Hr) IV Continuous <Continuous>  dextrose 50% Injectable 12.5 Gram(s) IV Push once  dextrose 50% Injectable 25 Gram(s) IV Push once  dextrose 50% Injectable 25 Gram(s) IV Push once  escitalopram 5 milliGRAM(s) Oral daily  folic acid 1 milliGRAM(s) Oral daily  furosemide   Injectable 80 milliGRAM(s) IV Push two times a day  heparin   Injectable 5000 Unit(s) SubCutaneous three times a day  insulin lispro (HumaLOG) corrective regimen sliding scale   SubCutaneous three times a day before meals  insulin lispro (HumaLOG) corrective regimen sliding scale   SubCutaneous at bedtime  isosorbide   mononitrate ER Tablet (IMDUR) 120 milliGRAM(s) Oral daily  magnesium oxide 400 milliGRAM(s) Oral three times a day with meals  metoprolol tartrate 25 milliGRAM(s) Oral two times a day  pantoprazole    Tablet 40 milliGRAM(s) Oral before breakfast  ranolazine 500 milliGRAM(s) Oral two times a day  simvastatin 40 milliGRAM(s) Oral at bedtime

## 2020-06-11 NOTE — PROVIDER CONTACT NOTE (OTHER) - ASSESSMENT
patient AxOx4, patient temperature low, patient denies chest pain, SOB, n/v/dizziness, or chills. patient cool to touch, patient states feels cold, extra blankets and warm packs provided. safety maintained and will continue to monitor.

## 2020-06-12 NOTE — PROGRESS NOTE ADULT - SUBJECTIVE AND OBJECTIVE BOX
Patient is a 89y old  Male who presents with a chief complaint of Chest pain (11 Jun 2020 10:28)      INTERVAL HPI/OVERNIGHT EVENTS: no chest pain,  See event note.     MEDICATIONS  (STANDING):  aspirin enteric coated 81 milliGRAM(s) Oral daily  cholecalciferol 1000 Unit(s) Oral daily  clopidogrel Tablet 75 milliGRAM(s) Oral daily  dextrose 5%. 1000 milliLiter(s) (50 mL/Hr) IV Continuous <Continuous>  dextrose 50% Injectable 12.5 Gram(s) IV Push once  dextrose 50% Injectable 25 Gram(s) IV Push once  dextrose 50% Injectable 25 Gram(s) IV Push once  escitalopram 5 milliGRAM(s) Oral daily  folic acid 1 milliGRAM(s) Oral daily  furosemide   Injectable 80 milliGRAM(s) IV Push two times a day  heparin   Injectable 5000 Unit(s) SubCutaneous three times a day  insulin lispro (HumaLOG) corrective regimen sliding scale   SubCutaneous three times a day before meals  insulin lispro (HumaLOG) corrective regimen sliding scale   SubCutaneous at bedtime  isosorbide   mononitrate ER Tablet (IMDUR) 120 milliGRAM(s) Oral daily  magnesium oxide 400 milliGRAM(s) Oral three times a day with meals  metoprolol tartrate 25 milliGRAM(s) Oral two times a day  pantoprazole    Tablet 40 milliGRAM(s) Oral before breakfast  ranolazine 500 milliGRAM(s) Oral two times a day  simvastatin 40 milliGRAM(s) Oral at bedtime    MEDICATIONS  (PRN):  dextrose 40% Gel 15 Gram(s) Oral once PRN Blood Glucose LESS THAN 70 milliGRAM(s)/deciliter  glucagon  Injectable 1 milliGRAM(s) IntraMuscular once PRN Glucose LESS THAN 70 milligrams/deciliter  nitroglycerin     SubLingual 0.4 milliGRAM(s) SubLingual every 5 minutes PRN Chest Pain        Orders last 24 hours:  POCT  Blood Glucose (06-11-20 @ 11:52)  POCT  Blood Glucose (06-11-20 @ 17:25)  POCT  Blood Glucose (06-11-20 @ 21:28)  POCT  Blood Glucose (06-12-20 @ 04:33)  POCT  Blood Glucose (06-12-20 @ 07:55)  Complete Blood Count: STAT (06-12-20 @ 08:08)      Allergies    No Known Allergies    Intolerances        REVIEW OF SYSTEMS:  CARDIOVASCULAR: No chest pain, palpitations, dizziness, or leg swelling; no shortness of breath     RESPIRATORY: No cough, wheezing, chills or hemoptysis; No shortness of breath    GASTROINTESTINAL: No abdominal or epigastric pain. No nausea, vomiting, or hematemesis; No diarrhea or constipation. No melena or hematochezia.    NEUROLOGICAL: No headaches, memory loss, loss of strength, numbness      PHYSICAL EXAM:  Vital Signs Last 24 Hrs  T(C): 36.6 (12 Jun 2020 05:33), Max: 37 (11 Jun 2020 21:38)  T(F): 97.8 (12 Jun 2020 05:33), Max: 98.6 (11 Jun 2020 21:38)  HR: 76 (12 Jun 2020 05:33) (76 - 89)  BP: 128/75 (12 Jun 2020 05:33) (119/69 - 138/80)  BP(mean): --  RR: 16 (12 Jun 2020 05:33) (16 - 22)  SpO2: 94% (12 Jun 2020 05:33) (94% - 97%)    GENERAL: NAD, well-groomed, well-developed  HEAD:  Atraumatic, Normocephalic  EYES: EOMI, PERRLA, conjunctiva and sclera clear  NECK: Supple, No JVD, Normal thyroid  NERVOUS SYSTEM:  Alert & Oriented X3, Good concentration;  and symmetric  CHEST/LUNG: Clear to auscultation bilaterally; No rales, rhonchi, wheezing, or rubs  HEART: Split S1S2 regular, without murmur, rub nor gallop  ABDOMEN: Soft, Nontender, Nondistended; Bowel sounds present  EXTREMITIES:  mild  edema      LABS:  cbc clotted.   12 Jun 2020 06:00    139    |  100    |  42     ----------------------------<  180    4.9     |  23     |  1.83     Ca    8.8        12 Jun 2020 06:00  Phos  3.2       12 Jun 2020 06:00  Mg     1.9       12 Jun 2020 06:00        CAPILLARY BLOOD GLUCOSE      POCT Blood Glucose.: 204 mg/dL (12 Jun 2020 07:54)  POCT Blood Glucose.: 199 mg/dL (12 Jun 2020 04:32)  POCT Blood Glucose.: 248 mg/dL (11 Jun 2020 21:26)  POCT Blood Glucose.: 181 mg/dL (11 Jun 2020 17:18)  POCT Blood Glucose.: 177 mg/dL (11 Jun 2020 11:40)      TELEMETRY:    RADIOLOGY & ADDITIONAL TESTS:    Imaging Personally Reviewed:  [ ] YES     Consultant(s) Notes Reviewed:      · Assessment		  89-year old gentleman with a past medical history of chronic systolic congestive heart failure, triple-vessel coronary artery disease, history of coronary angioplasty last 2016, not amenable to PCI or CABG, DM2, HTN, CKD3,  now admitted with recurrent  CP (?Due to non-compliance?)    · ACS   cont medical management for CAD .. nt candidate for aggressie treatment    Metoprolol to 25 bid.  Nitrates and ranexa.  Started on epogen to increase H/H, with better oxygen carrying capacity   Recheck CBC.  ambulate pt      Chronic systolic CHF (congestive heart failure).  Plan:  Continue to diurese   Change to PO Lasix       overall comfortable on NC       ·  Problem: DM (diabetes mellitus).  Plan: Insulin sliding scale    ·  Problem: Essential hypertension.  Plan: monitor   cont meds See above.      D/c planning       pt had MOLST form  last admission       Attending Attestation:   40 minutes spent on total encounter; more than 50% of the visit was spent counseling and/or coordinating care by the attending physician.

## 2020-06-12 NOTE — PROVIDER CONTACT NOTE (OTHER) - ASSESSMENT
Patient originally AxOx4, woke up AxOx1, disoriented to place, time, and situation. patient reoriented, redirectable. denies any chest pain, SOB, dizziness, lightheadedness, headaches, or pain. Blood glucose 199. patient woken up 15 minutes later and patient was able to answer questions correctly and became AxOx3-4. safety maintained, will continue to monitor orientation status.

## 2020-06-12 NOTE — PROGRESS NOTE ADULT - SUBJECTIVE AND OBJECTIVE BOX
[facility]Y-[facility] [phone]        Patient seen and examined in bed.  He was about the same  no chest pain noted         MEDICATIONS  (STANDING):  aspirin enteric coated 81 milliGRAM(s) Oral daily  cholecalciferol 1000 Unit(s) Oral daily  clopidogrel Tablet 75 milliGRAM(s) Oral daily  dextrose 5%. 1000 milliLiter(s) (50 mL/Hr) IV Continuous <Continuous>  dextrose 50% Injectable 12.5 Gram(s) IV Push once  dextrose 50% Injectable 25 Gram(s) IV Push once  dextrose 50% Injectable 25 Gram(s) IV Push once  escitalopram 5 milliGRAM(s) Oral daily  folic acid 1 milliGRAM(s) Oral daily  furosemide   Injectable 80 milliGRAM(s) IV Push two times a day  heparin   Injectable 5000 Unit(s) SubCutaneous three times a day  insulin lispro (HumaLOG) corrective regimen sliding scale   SubCutaneous three times a day before meals  insulin lispro (HumaLOG) corrective regimen sliding scale   SubCutaneous at bedtime  isosorbide   mononitrate ER Tablet (IMDUR) 120 milliGRAM(s) Oral daily  magnesium oxide 400 milliGRAM(s) Oral three times a day with meals  metoprolol tartrate 25 milliGRAM(s) Oral two times a day  pantoprazole    Tablet 40 milliGRAM(s) Oral before breakfast  ranolazine 500 milliGRAM(s) Oral two times a day  simvastatin 40 milliGRAM(s) Oral at bedtime      VITAL:  T(C): , Max: 37 (06-11-20 @ 21:38)  T(F): , Max: 98.6 (06-11-20 @ 21:38)  HR: 76 (06-12-20 @ 05:33)  BP: 128/75 (06-12-20 @ 05:33)  BP(mean): --  RR: 16 (06-12-20 @ 05:33)  SpO2: 94% (06-12-20 @ 05:33)  Wt(kg): --    I and O's:    06-11 @ 07:01  -  06-12 @ 07:00  --------------------------------------------------------  IN: 0 mL / OUT: 950 mL / NET: -950 mL          PHYSICAL EXAM:    Constitutional: NAD  Neck:  No JVD  Respiratory: CTAB/L  Cardiovascular: S1 and S2  Gastrointestinal: BS+, soft, NT/ND  Extremities: trace peripheral edema  Neurological: A/O x 3, no focal deficits  Psychiatric: Normal mood, normal affect  : No Vickers  Skin: No rashes  Access: Not applicable    LABS:                        8.4    7.52  )-----------( 282      ( 12 Jun 2020 10:36 )             27.3     06-12    139  |  100  |  42<H>  ----------------------------<  180<H>  4.9   |  23  |  1.83<H>    Ca    8.8      12 Jun 2020 06:00  Phos  3.2     06-12  Mg     1.9     06-12            Urine Studies:          RADIOLOGY & ADDITIONAL STUDIES:

## 2020-06-12 NOTE — PROGRESS NOTE ADULT - SUBJECTIVE AND OBJECTIVE BOX
Patient seen and examined at bedside.    Overnight Events: no issues overnight, feeling much better    ROS: no chest pain/chest pressure/dyspnea     Current Meds:  aspirin enteric coated 81 milliGRAM(s) Oral daily  cholecalciferol 1000 Unit(s) Oral daily  clopidogrel Tablet 75 milliGRAM(s) Oral daily  dextrose 40% Gel 15 Gram(s) Oral once PRN  dextrose 5%. 1000 milliLiter(s) IV Continuous <Continuous>  dextrose 50% Injectable 12.5 Gram(s) IV Push once  dextrose 50% Injectable 25 Gram(s) IV Push once  dextrose 50% Injectable 25 Gram(s) IV Push once  escitalopram 5 milliGRAM(s) Oral daily  folic acid 1 milliGRAM(s) Oral daily  furosemide   Injectable 80 milliGRAM(s) IV Push two times a day  glucagon  Injectable 1 milliGRAM(s) IntraMuscular once PRN  heparin   Injectable 5000 Unit(s) SubCutaneous three times a day  insulin lispro (HumaLOG) corrective regimen sliding scale   SubCutaneous three times a day before meals  insulin lispro (HumaLOG) corrective regimen sliding scale   SubCutaneous at bedtime  isosorbide   mononitrate ER Tablet (IMDUR) 120 milliGRAM(s) Oral daily  magnesium oxide 400 milliGRAM(s) Oral three times a day with meals  metoprolol tartrate 25 milliGRAM(s) Oral two times a day  nitroglycerin     SubLingual 0.4 milliGRAM(s) SubLingual every 5 minutes PRN  pantoprazole    Tablet 40 milliGRAM(s) Oral before breakfast  ranolazine 500 milliGRAM(s) Oral two times a day  simvastatin 40 milliGRAM(s) Oral at bedtime    Vitals:  T(F): 97.8 (06-12), Max: 98.6 (06-11)  HR: 76 (06-12) (76 - 89)  BP: 128/75 (06-12) (119/69 - 138/80)  RR: 16 (06-12)  SpO2: 94% (06-12)  I&O's Summary    11 Jun 2020 07:01  -  12 Jun 2020 07:00  --------------------------------------------------------  IN: 0 mL / OUT: 950 mL / NET: -950 mL    Physical Exam:    GENERAL: No acute distress, elderly frail, exhausted  HEAD:  Atraumatic, Normocephalic  ENT: EOMI, Neck supple, no JVD, moist mucosa  CHEST/LUNG: Decreased breath sounds in lower lung fields (improving)   BACK: No spinal tenderness  HEART: Regular rate and rhythm; No murmurs, rubs, or gallops  ABDOMEN: Soft, Nontender, Nondistended; Bowel sounds present  EXTREMITIES:  No clubbing, cyanosis, 2+ pitting edema  PSYCH: Nl behavior, nl affect  NEUROLOGY: AAOx3, non-focal,  SKIN: Normal color, No rashes or lesions                        8.6    8.09  )-----------( 298      ( 11 Jun 2020 05:20 )             27.8     06-12    139  |  100  |  42<H>  ----------------------------<  180<H>  4.9   |  23  |  1.83<H>    Ca    8.8      12 Jun 2020 06:00  Phos  3.2     06-12  Mg     1.9     06-12    CARDIAC MARKERS ( 10 Christophe 2020 07:47 )  x     / x     / x     / 60 u/L / 3.36 ng/mL / x      CARDIAC MARKERS ( 05 Jun 2020 15:00 )  x     / x     / x     / 74 u/L / 6.11 ng/mL / x        Serum Pro-Brain Natriuretic Peptide: 27372 pg/mL (06-10 @ 06:10)    New ECG(s): Personally reviewed      Echo:  < from: Transthoracic Echocardiogram (02.19.20 @ 12:19) >  CONCLUSIONS:  1. Tethered mitral valve leaflets with normal opening.  Moderate-severe mitral regurgitation.  2. Calcified trileaflet aortic valve with normal opening.  Mild aortic regurgitation.  3. Severe global left ventricular systolic dysfunction.  4. Right ventricular enlargement with decreased right  ventricular systolic function.  5. Normal tricuspid valve. Moderate tricuspid  regurgitation.  6. Estimated pulmonary artery systolic pressure equals 50  mm Hg, assuming right atrial pressure equals 10  mm Hg,  consistent with moderate pulmonary hypertension.  ------------------------------------------------------------------------  Confirmed on  2/19/2020 - 13:12:47 by Jada Burns MD    < end of copied text >    Cath:  < from: Cardiac Cath Lab - Adult (11.17.16 @ 13:49) >  CORONARY VESSELS: The coronary circulation is right dominant.  LM:   --  LM: Normal.  LAD:   --  Mid LAD: There was a 60 % stenosis. In a second lesion, there  was a 99 % stenosis. The lesion was eccentric andmoderately calcified.  --  Distal LAD: There was a diffuse 50 % stenosis. The lesion was  calcified.  --  D1: There was a 75 % stenosis in the middle third of the vessel  segment. There was a small vascular territory distal to the lesion.  CX:   --  Proximal circumflex: There was a 75 % stenosis. The lesion was  complex.  RCA:   --  Proximal RCA: There was a 100 % stenosis. There was poor  collateral blood supply to the distal myocardium.  COMPLICATIONS: No complications occurred during the cath lab visit.  DIAGNOSTIC IMPRESSIONS: Patient has severe diffsue coronary arterey disea  involving all three coronaries, no ideal for intervention or CABG.  DIAGNOSTIC RECOMMENDATIONS: Aggressive medical therapy and risk factor  modification.  Prepared and signed by  Lior Rivera M.D.    Interpretation of Telemetry: sinus 70-80s with PVCs    Assessment and Plan:    89 year old M pt with PMH of HTN, HLD, DM, known CAD with multi-vessel disease on [facility] in 2016 without lesions amenable for PCI/CABG per report presented from home with chest pressure and dyspnea.  He had recent NSTEMI, still on medical therapy.  I think his symptoms are mostly due to a CHF exacerbation, may also be related to noncompliance with anti-anginals.    Acute on chronic heart failure, improved singificantly  - can transition to home PO lasix  - recommend compression stockings or ace wraps to improve lower extremity edema  - strict ins and outs, daily wts  - fluids and sodium restriction  - monitor on Tele  - replete K and Mg    CAD, recent NSTEMI  - continue home meds ASA/Plavix/metop/imdur/ranexa  - no need to trend enzymes further    Gabriel Porter MD  Cardiology Fellow   [phone]  For all other Cardiology service contact information, go to [email or web address] and use "[email or web address]" to login.

## 2020-06-12 NOTE — PROGRESS NOTE ADULT - ASSESSMENT
89 y.o M pmh HTN. DM2 not on insulin, CKD3, CAD c TVD not amenable to surgery or PCI, Chronic systolic CHF (sever LV Dysfunction), S/P PPM (medtronic) and discharged 6/9 after admission for NSTEMI presents with chest pain  CKD stage 4 at present.  Given the age the exact etiology is not important  Anemia needs to be fixed in order help with sx of chest pain   Secondary hyperparathyroidism     1 Renal - Change to Lasix 60mg po bid   No need to repeat renal sono  Check PTH and Phos  No need for Rocaltrol at present      2 Anemia-   retacrit x 1 and the HH is improving     3 CVS-At present not with chest pain.  Defer to cards re increasing Lopressor for lowering heart rate

## 2020-06-12 NOTE — PROVIDER CONTACT NOTE (OTHER) - ASSESSMENT
patient AxOx4, denies chest pain, SOB, n/v/dizziness, chills, or pain. patient warm, extra blankets provided. safety and fall precautions maintained. will continue to monitor.

## 2020-06-13 NOTE — PROGRESS NOTE ADULT - SUBJECTIVE AND OBJECTIVE BOX
Patient is a 89y old  Male who presents with a chief complaint of Chest pain (12 Jun 2020 14:13)      INTERVAL HPI/OVERNIGHT EVENTS: chest pain earlier today (see event note)   MEDICATIONS  (STANDING):  aspirin enteric coated 81 milliGRAM(s) Oral daily  cholecalciferol 1000 Unit(s) Oral daily  clopidogrel Tablet 75 milliGRAM(s) Oral daily  dextrose 5%. 1000 milliLiter(s) (50 mL/Hr) IV Continuous <Continuous>  dextrose 50% Injectable 12.5 Gram(s) IV Push once  dextrose 50% Injectable 25 Gram(s) IV Push once  dextrose 50% Injectable 25 Gram(s) IV Push once  escitalopram 5 milliGRAM(s) Oral daily  folic acid 1 milliGRAM(s) Oral daily  furosemide    Tablet 60 milliGRAM(s) Oral two times a day  heparin   Injectable 5000 Unit(s) SubCutaneous three times a day  insulin lispro (HumaLOG) corrective regimen sliding scale   SubCutaneous three times a day before meals  insulin lispro (HumaLOG) corrective regimen sliding scale   SubCutaneous at bedtime  isosorbide   mononitrate ER Tablet (IMDUR) 120 milliGRAM(s) Oral daily  metoprolol tartrate 25 milliGRAM(s) Oral two times a day  pantoprazole    Tablet 40 milliGRAM(s) Oral before breakfast  ranolazine 500 milliGRAM(s) Oral two times a day  simvastatin 40 milliGRAM(s) Oral at bedtime    MEDICATIONS  (PRN):  dextrose 40% Gel 15 Gram(s) Oral once PRN Blood Glucose LESS THAN 70 milliGRAM(s)/deciliter  glucagon  Injectable 1 milliGRAM(s) IntraMuscular once PRN Glucose LESS THAN 70 milligrams/deciliter        Orders last 24 hours:  Complete Blood Count: AM Sched. Collection: 14-Jun-2020 05:00 (06-13-20 @ 07:21)  Basic Metabolic Panel w/Magnesium: AM Sched. Collection: 14-Jun-2020 05:00 (06-13-20 @ 07:21)  Complete Blood Count: AM Sched. Collection: 15-Christophe-2020 05:00 (06-13-20 @ 07:21)  Basic Metabolic Panel w/Magnesium: AM Sched. Collection: 15-Christophe-2020 05:00 (06-13-20 @ 07:21)  POCT  Blood Glucose (06-13-20 @ 08:15)  POCT  Blood Glucose (06-13-20 @ 11:51)  POCT  Blood Glucose (06-13-20 @ 17:23)  POCT  Blood Glucose (06-13-20 @ 21:10)      Allergies    No Known Allergies    Intolerances        REVIEW OF SYSTEMS:  CARDIOVASCULAR: No chest pain, palpitations, dizziness, or leg swelling; no shortness of breath     RESPIRATORY: No cough, wheezing, chills or hemoptysis; No shortness of breath    GASTROINTESTINAL: No abdominal or epigastric pain. No nausea, vomiting, or hematemesis; No diarrhea or constipation. No melena or hematochezia.    NEUROLOGICAL: No headaches, memory loss, loss of strength, numbness      PHYSICAL EXAM:  Vital Signs Last 24 Hrs  T(C): 37.1 (13 Jun 2020 21:55), Max: 37.1 (13 Jun 2020 21:55)  T(F): 98.7 (13 Jun 2020 21:55), Max: 98.7 (13 Jun 2020 21:55)  HR: 80 (13 Jun 2020 21:55) (74 - 80)  BP: 115/68 (13 Jun 2020 21:55) (115/68 - 135/86)  BP(mean): --  RR: 19 (13 Jun 2020 21:55) (19 - 20)  SpO2: 97% (13 Jun 2020 21:55) (96% - 100%)    GENERAL: NAD, well-groomed, well-developed  HEAD:  Atraumatic, Normocephalic  EYES: EOMI, PERRLA, conjunctiva and sclera clear  NECK: Supple, No JVD, Normal thyroid  NERVOUS SYSTEM:  Alert & Oriented X3, Good concentration;  and symmetric  CHEST/LUNG: Clear to auscultation bilaterally; No rales, rhonchi, wheezing, or rubs  HEART: S4S1S2 regular, without murmur, rub nor gallop  ABDOMEN: Soft, Nontender, Nondistended; Bowel sounds present  EXTREMITIES:  2+ Peripheral Pulses, No clubbing, cyanosis, or edema      LABS:                        8.0    5.50  )-----------( 269      ( 13 Jun 2020 06:04 )             27.7     13 Jun 2020 06:04    141    |  99     |  41     ----------------------------<  156    3.8     |  27     |  1.83     Ca    8.9        13 Jun 2020 06:04        CAPILLARY BLOOD GLUCOSE      POCT Blood Glucose.: 192 mg/dL (13 Jun 2020 21:09)  POCT Blood Glucose.: 216 mg/dL (13 Jun 2020 17:22)  POCT Blood Glucose.: 236 mg/dL (13 Jun 2020 11:48)  POCT Blood Glucose.: 193 mg/dL (13 Jun 2020 08:15)      · Assessment		  89-year old gentleman with a past medical history of chronic systolic congestive heart failure, triple-vessel coronary artery disease, history of coronary angioplasty last 2016, not amenable to PCI or CABG, DM2, HTN, CKD3,  now admitted with recurrent  CP (?Due to non-compliance?)    · ACS   cont medical management for CAD .. nt candidate for aggressie treatment    Metoprolol to 25 bid.  Nitrates and ranexa.  Started on epogen to increase H/H, for better oxygen carrying capacity   Monitor  CBC.  ambulate pt   Can advance Ranexa to 1000   Chronic systolic CHF (congestive heart failure).  Plan:  Continue to diurese   Change to PO Lasix       overall comfortable on NC       ·  Problem: DM (diabetes mellitus).  Plan: Insulin sliding scale    ·  Problem: Essential hypertension.  Plan: monitor   cont meds See above.      D/c planning       pt had MOLST form  last admission       Attending Attestation:   40 minutes spent on total encounter; more than 50% of the visit was spent counseling and/or coordinating care by the attending physician.

## 2020-06-14 NOTE — PROGRESS NOTE ADULT - SUBJECTIVE AND OBJECTIVE BOX
Patient is a 89y old  Male who presents with a chief complaint of Chest pain (13 Jun 2020 22:17)      INTERVAL HPI/OVERNIGHT EVENTS: none; no chest pain     MEDICATIONS  (STANDING):  aspirin enteric coated 81 milliGRAM(s) Oral daily  cholecalciferol 1000 Unit(s) Oral daily  clopidogrel Tablet 75 milliGRAM(s) Oral daily  dextrose 5%. 1000 milliLiter(s) (50 mL/Hr) IV Continuous <Continuous>  dextrose 50% Injectable 12.5 Gram(s) IV Push once  dextrose 50% Injectable 25 Gram(s) IV Push once  dextrose 50% Injectable 25 Gram(s) IV Push once  escitalopram 5 milliGRAM(s) Oral daily  folic acid 1 milliGRAM(s) Oral daily  furosemide    Tablet 60 milliGRAM(s) Oral two times a day  heparin   Injectable 5000 Unit(s) SubCutaneous three times a day  insulin lispro (HumaLOG) corrective regimen sliding scale   SubCutaneous three times a day before meals  insulin lispro (HumaLOG) corrective regimen sliding scale   SubCutaneous at bedtime  isosorbide   mononitrate ER Tablet (IMDUR) 120 milliGRAM(s) Oral daily  metoprolol tartrate 25 milliGRAM(s) Oral two times a day  pantoprazole    Tablet 40 milliGRAM(s) Oral before breakfast  ranolazine 500 milliGRAM(s) Oral two times a day  simvastatin 40 milliGRAM(s) Oral at bedtime    MEDICATIONS  (PRN):  dextrose 40% Gel 15 Gram(s) Oral once PRN Blood Glucose LESS THAN 70 milliGRAM(s)/deciliter  glucagon  Injectable 1 milliGRAM(s) IntraMuscular once PRN Glucose LESS THAN 70 milligrams/deciliter        Orders last 24 hours:  POCT  Blood Glucose (06-13-20 @ 08:15)  POCT  Blood Glucose (06-13-20 @ 11:51)  POCT  Blood Glucose (06-13-20 @ 17:23)  POCT  Blood Glucose (06-13-20 @ 21:10)  POCT  Blood Glucose (06-14-20 @ 07:18)      Allergies    No Known Allergies    Intolerances        REVIEW OF SYSTEMS:  CARDIOVASCULAR: No chest pain, palpitations, dizziness, or leg swelling; no shortness of breath     RESPIRATORY: No cough, wheezing, chills or hemoptysis; No shortness of breath    GASTROINTESTINAL: No abdominal or epigastric pain. No nausea, vomiting, or hematemesis; No diarrhea or constipation. No melena or hematochezia.    NEUROLOGICAL: No headaches, memory loss, loss of strength, numbness      PHYSICAL EXAM:  Vital Signs Last 24 Hrs  T(C): 36.9 (14 Jun 2020 05:15), Max: 37.1 (13 Jun 2020 21:55)  T(F): 98.5 (14 Jun 2020 05:15), Max: 98.7 (13 Jun 2020 21:55)  HR: 70 (14 Jun 2020 05:15) (70 - 80)  BP: 116/57 (14 Jun 2020 05:15) (115/68 - 135/86)  BP(mean): --  RR: 20 (14 Jun 2020 05:15) (19 - 20)  SpO2: 96% (14 Jun 2020 05:15) (96% - 97%)    GENERAL: NAD, well-groomed, well-developed  HEAD:  Atraumatic, Normocephalic  EYES: EOMI, PERRLA, conjunctiva and sclera clear  NECK: Supple, No JVD, Normal thyroid  NERVOUS SYSTEM:  Alert & Oriented X3, Good concentration;  and symmetric  CHEST/LUNG: Clear to auscultation bilaterally; No rales, rhonchi, wheezing, or rubs  HEART: S1S2 regular, without murmur, rub nor gallop  ABDOMEN: Soft, Nontender, Nondistended; Bowel sounds present  EXTREMITIES:  2+ Peripheral Pulses, No clubbing, cyanosis, or edema      LABS:                        8.2    6.24  )-----------( 223      ( 14 Jun 2020 06:10 )             27.0       Ca    8.9        13 Jun 2020 06:04        CAPILLARY BLOOD GLUCOSE      POCT Blood Glucose.: 213 mg/dL (14 Jun 2020 07:17)  POCT Blood Glucose.: 192 mg/dL (13 Jun 2020 21:09)  POCT Blood Glucose.: 216 mg/dL (13 Jun 2020 17:22)  POCT Blood Glucose.: 236 mg/dL (13 Jun 2020 11:48)  POCT Blood Glucose.: 193 mg/dL (13 Jun 2020 08:15)      TELEMETRY:    RADIOLOGY & ADDITIONAL TESTS:    Imaging Personally Reviewed:  [ ] YES     Consultant(s) Notes Reviewed:        · Assessment		  89-year old gentleman with a past medical history of chronic systolic congestive heart failure, triple-vessel coronary artery disease, history of coronary angioplasty last 2016, not amenable to PCI or CABG, DM2, HTN, CKD3,  now admitted with recurrent  CP (?Due to non-compliance?)    · ACS   cont medical management for CAD .. nt candidate for aggressie treatment    Metoprolol to 25 bid.  Nitrates and ranexa.  Started on epogen to increase H/H, for better oxygen carrying capacity   Monitor  CBC.  No significant change.   ?  Need to increase dose?   Can advance Ranexa to 1000   Chronic systolic CHF (congestive heart failure).  Plan:  Continue to diurese   Change to PO Lasix  decrease to 40 bid..  Can d/c on 40 daily      overall comfortable on NC       ·  Problem: DM (diabetes mellitus).  Plan: Insulin sliding scale    ·  Problem: Essential hypertension.  Plan: monitor   cont meds See above.      D/c planning       pt had MOLST form  last admission

## 2020-06-14 NOTE — PHYSICAL THERAPY INITIAL EVALUATION ADULT - LIVES WITH, PROFILE
Charge nurse made aware of pts isolation status.    alone/+steps to enter Other (Free Text): ****Plan to send Absorica 40mg BID if labs WNL**** Detail Level: Simple Note Text (......Xxx Chief Complaint.): This diagnosis correlates with the

## 2020-06-14 NOTE — PROVIDER CONTACT NOTE (OTHER) - SITUATION
patient complaining of chest pain after eating; patient states it is sharp. vital signs stable, afebrile. EKG done and in chart. EKG given to PA and compared to yesterdays with no changes.

## 2020-06-14 NOTE — PROVIDER CONTACT NOTE (OTHER) - ASSESSMENT
vital signs stable, EKG done with no changes. as per PA will order maalox. will continue to monitor.

## 2020-06-14 NOTE — PROGRESS NOTE ADULT - SUBJECTIVE AND OBJECTIVE BOX
[facility]-Leora Ziegler NP- PROGRESS NOTE    Patient seen and examined sitting up in bed eating lunch. Off O2 and no c/o SOB.       Hospital Medications:   MEDICATIONS  (STANDING):  aspirin enteric coated 81 milliGRAM(s) Oral daily  cholecalciferol 1000 Unit(s) Oral daily  clopidogrel Tablet 75 milliGRAM(s) Oral daily  dextrose 5%. 1000 milliLiter(s) (50 mL/Hr) IV Continuous <Continuous>  dextrose 50% Injectable 12.5 Gram(s) IV Push once  dextrose 50% Injectable 25 Gram(s) IV Push once  dextrose 50% Injectable 25 Gram(s) IV Push once  escitalopram 5 milliGRAM(s) Oral daily  folic acid 1 milliGRAM(s) Oral daily  heparin   Injectable 5000 Unit(s) SubCutaneous three times a day  insulin lispro (HumaLOG) corrective regimen sliding scale   SubCutaneous three times a day before meals  insulin lispro (HumaLOG) corrective regimen sliding scale   SubCutaneous at bedtime  isosorbide   mononitrate ER Tablet (IMDUR) 120 milliGRAM(s) Oral daily  metoprolol tartrate 25 milliGRAM(s) Oral two times a day  pantoprazole    Tablet 40 milliGRAM(s) Oral before breakfast  ranolazine 500 milliGRAM(s) Oral two times a day  simvastatin 40 milliGRAM(s) Oral at bedtime      REVIEW OF SYSTEMS:  As per HPI, 8 out of 10 ROS were unremarkable    VITALS:  T(F): 98.5 (06-14-20 @ 05:15), Max: 98.7 (06-13-20 @ 21:55)  HR: 72 (06-14-20 @ 11:34)  BP: 121/59 (06-14-20 @ 11:34)  RR: 21 (06-14-20 @ 11:34)  SpO2: 98% (06-14-20 @ 11:34)  Wt(kg): --    06-13 @ 07:01  -  06-14 @ 07:00  --------------------------------------------------------  IN: 0 mL / OUT: 1000 mL / NET: -1000 mL    06-14 @ 07:01  -  06-14 @ 13:22  --------------------------------------------------------  IN: 0 mL / OUT: 400 mL / NET: -400 mL          PHYSICAL EXAM:  Constitutional: NAD  HEENT: PERRL  Neck: No JVD  Respiratory: CTAB, no wheezes, rales or rhonchi  Cardiovascular: S1, S2, RRR  Gastrointestinal: BS+, soft, NT/ND  Extremities: No peripheral edema  Neurological: A/O x 3, no focal deficits  Psychiatric: Normal mood, normal affect  : No CVA tenderness. No samaniego.   Skin: No rashes    LABS:      06-14    139  |  96<L>  |  48<H>  ----------------------------<  212<H>  4.0   |  29  |  2.21<H>  06-13    141  |  99  |  41<H>  ----------------------------<  156<H>  3.8   |  27  |  1.83<H>  06-12    139  |  100  |  42<H>  ----------------------------<  180<H>  4.9   |  23  |  1.83<H>    Ca    9.1      14 Jun 2020 06:10  Mg     2.1     06-14  Mg     1.9     06-12                              8.2    6.24  )-----------( 223      ( 14 Jun 2020 06:10 )             27.0

## 2020-06-14 NOTE — PHYSICAL THERAPY INITIAL EVALUATION ADULT - CRITERIA FOR SKILLED THERAPEUTIC INTERVENTIONS
impairments found/risk reduction/prevention/therapy frequency/rehab potential/predicted duration of therapy intervention/functional limitations in following categories/anticipated discharge recommendation

## 2020-06-14 NOTE — PHYSICAL THERAPY INITIAL EVALUATION ADULT - PERTINENT HX OF CURRENT PROBLEM, REHAB EVAL
Patient is a 89 year old male admitted to [facility] with chest pain. PMH listed below

## 2020-06-14 NOTE — PROGRESS NOTE ADULT - ASSESSMENT
Assessment	  89 y.o M pmh HTN. DM2 not on insulin, CKD3, CAD c TVD not amenable to surgery or PCI, Chronic systolic CHF (severe LV Dysfunction), S/P PPM ([employer]) and discharged 6/9 after admission for NSTEMI presents with chest pain  CKD stage 4 at present.  Given the age the exact etiology is not important  Anemia needs to be fixed in order to help with sx of chest pain   Secondary hyperparathyroidism     1 Renal - Azotemia rising. Holding next dose of Lasix. Re-evaluate in AM. Decrease to lasix 40 mg PO BID starting tomorrow if renal parameters trend down.  No need for Rocaltrol at present      2 Anemia-   retacrit x 1 given. Hgb still trending low.     3 CVS-Chest pain after eating? GI related?  On Imdur and Lopressor

## 2020-06-15 NOTE — PROGRESS NOTE ADULT - SUBJECTIVE AND OBJECTIVE BOX
Patient seen and examined at bedside.    Overnight Events:  no issues overnight    ROS: no chest pain/shortness of breath    Current Meds:  aluminum hydroxide/magnesium hydroxide/simethicone Suspension 30 milliLiter(s) Oral every 4 hours PRN  aspirin enteric coated 81 milliGRAM(s) Oral daily  cholecalciferol 1000 Unit(s) Oral daily  clopidogrel Tablet 75 milliGRAM(s) Oral daily  dextrose 40% Gel 15 Gram(s) Oral once PRN  dextrose 5%. 1000 milliLiter(s) IV Continuous <Continuous>  dextrose 50% Injectable 12.5 Gram(s) IV Push once  dextrose 50% Injectable 25 Gram(s) IV Push once  dextrose 50% Injectable 25 Gram(s) IV Push once  epoetin rex-epbx (RETACRIT) Injectable 78627 Unit(s) SubCutaneous once  escitalopram 5 milliGRAM(s) Oral daily  folic acid 1 milliGRAM(s) Oral daily  furosemide    Tablet 40 milliGRAM(s) Oral daily  glucagon  Injectable 1 milliGRAM(s) IntraMuscular once PRN  heparin   Injectable 5000 Unit(s) SubCutaneous three times a day  insulin lispro (HumaLOG) corrective regimen sliding scale   SubCutaneous three times a day before meals  insulin lispro (HumaLOG) corrective regimen sliding scale   SubCutaneous at bedtime  isosorbide   mononitrate ER Tablet (IMDUR) 120 milliGRAM(s) Oral daily  metoprolol tartrate 25 milliGRAM(s) Oral two times a day  pantoprazole    Tablet 40 milliGRAM(s) Oral before breakfast  ranolazine 500 milliGRAM(s) Oral two times a day  simvastatin 40 milliGRAM(s) Oral at bedtime    Vitals:  T(F): 97.5 (06-15), Max: 98.6 (06-14)  HR: 74 (06-15) (71 - 77)  BP: 113/65 (06-15) (110/60 - 121/59)  RR: 19 (06-15)  SpO2: 96% (06-15)  I&O's Summary    14 Jun 2020 07:01  -  15 Christophe 2020 07:00  --------------------------------------------------------  IN: 100 mL / OUT: 900 mL / NET: -800 mL    Physical Exam:  GENERAL: No acute distress, elderly frail, exhausted  HEAD:  Atraumatic, Normocephalic  ENT: EOMI, Neck supple, + JVD, moist mucosa  CHEST/LUNG: Decreased breath sounds in lower lung fields (improving)   BACK: No spinal tenderness  HEART: Regular rate and rhythm; No murmurs, rubs, or gallops  ABDOMEN: Soft, Nontender, Nondistended; Bowel sounds present  EXTREMITIES:  No clubbing, cyanosis, 2+ pitting edema up to thighs  PSYCH: Nl behavior, nl affect  NEUROLOGY: AAOx3, non-focal,  SKIN: Normal color, No rashes or lesions                          7.6    6.15  )-----------( 210      ( 15 Christophe 2020 06:15 )             24.6     06-15    138  |  96<L>  |  55<H>  ----------------------------<  176<H>  4.2   |  29  |  2.61<H>    Ca    8.9      15 Christophe 2020 06:15  Mg     2.1     06-15    CARDIAC MARKERS ( 10 Christophe 2020 07:47 )  x     / x     / x     / 60 u/L / 3.36 ng/mL / x        Serum Pro-Brain Natriuretic Peptide: 41759 pg/mL (06-10 @ 06:10)      Echo:  < from: Transthoracic Echocardiogram (02.19.20 @ 12:19) >  CONCLUSIONS:  1. Tethered mitral valve leaflets with normal opening.  Moderate-severe mitral regurgitation.  2. Calcified trileaflet aortic valve with normal opening.  Mild aortic regurgitation.  3. Severe global left ventricular systolic dysfunction.  4. Right ventricular enlargement with decreased right  ventricular systolic function.  5. Normal tricuspid valve. Moderate tricuspid  regurgitation.  6. Estimated pulmonary artery systolic pressure equals 50  mm Hg, assuming right atrial pressure equals 10  mm Hg,  consistent with moderate pulmonary hypertension.  ------------------------------------------------------------------------  Confirmed on  2/19/2020 - 13:12:47 by Jada Burns MD    < end of copied text >    Cath:  < from: Cardiac Cath Lab - Adult (11.17.16 @ 13:49) >  CORONARY VESSELS: The coronary circulation is right dominant.  LM:   --  LM: Normal.  LAD:   --  Mid LAD: There was a 60 % stenosis. In a second lesion, there  was a 99 % stenosis. The lesion was eccentric andmoderately calcified.  --  Distal LAD: There was a diffuse 50 % stenosis. The lesion was  calcified.  --  D1: There was a 75 % stenosis in the middle third of the vessel  segment. There was a small vascular territory distal to the lesion.  CX:   --  Proximal circumflex: There was a 75 % stenosis. The lesion was  complex.  RCA:   --  Proximal RCA: There was a 100 % stenosis. There was poor  collateral blood supply to the distal myocardium.  COMPLICATIONS: No complications occurred during the cath lab visit.  DIAGNOSTIC IMPRESSIONS: Patient has severe diffsue coronary arterey disea  involving all three coronaries, no ideal for intervention or CABG.  DIAGNOSTIC RECOMMENDATIONS: Aggressive medical therapy and risk factor  modification.  Prepared and signed by  Lior Rivera M.D.    Interpretation of Telemetry: sinus 70-80s with PVCs    Assessment and Plan:    89 year old M pt with PMH of HTN, HLD, DM, known CAD with multi-vessel disease on [facility] in 2016 without lesions amenable for PCI/CABG per report presented from home with chest pressure and dyspnea.  He had recent NSTEMI, still on medical therapy.  I think his symptoms are mostly due to a CHF exacerbation, may also be related to noncompliance with anti-anginals.    Acute on chronic heart failure, cardiorenal gustavo  - on my exam patient continues to appear volume overloaded would give 80 mg IV lasix bid  - recommend compression stockings or ace wraps to improve lower extremity edema  - strict ins and outs, daily wts  - fluids and sodium restriction  - monitor on Tele  - replete K and Mg    CAD, recent NSTEMI  - continue home meds ASA/Plavix/metop/imdur/ranexa  - no need to trend enzymes further    Gabriel Porter MD  Cardiology Fellow   [phone]  For all other Cardiology service contact information, go to [email or web address] and use "[employer] [employer]" to login.

## 2020-06-15 NOTE — PROGRESS NOTE ADULT - ASSESSMENT
89 y.o M pmh HTN. DM2 not on insulin, CKD3, CAD c TVD not amenable to surgery or PCI, Chronic systolic CHF (severe LV Dysfunction), S/P PPM ([employer]) and discharged 6/9 after admission for NSTEMI presents with chest pain  CKD stage 4 at present.  Given the age the exact etiology is not important  Anemia needs to be fixed in order to help with sx of chest pain   Secondary hyperparathyroidism     1 Renal - Azotemia rising. Reduce lasix to qd.  At present he is not in heart failure    No need for Rocaltrol at present      2 Anemia-   retacrit x 1 today;      3 CVS-No in heart failure at present;  No active chest pain at present

## 2020-06-15 NOTE — PROGRESS NOTE ADULT - SUBJECTIVE AND OBJECTIVE BOX
[facility] [phone]        Patient seen and examined in bed.  He was about the same   No chest pain         MEDICATIONS  (STANDING):  aspirin enteric coated 81 milliGRAM(s) Oral daily  cholecalciferol 1000 Unit(s) Oral daily  clopidogrel Tablet 75 milliGRAM(s) Oral daily  dextrose 5%. 1000 milliLiter(s) (50 mL/Hr) IV Continuous <Continuous>  dextrose 50% Injectable 12.5 Gram(s) IV Push once  dextrose 50% Injectable 25 Gram(s) IV Push once  dextrose 50% Injectable 25 Gram(s) IV Push once  escitalopram 5 milliGRAM(s) Oral daily  folic acid 1 milliGRAM(s) Oral daily  furosemide    Tablet 40 milliGRAM(s) Oral two times a day  heparin   Injectable 5000 Unit(s) SubCutaneous three times a day  insulin lispro (HumaLOG) corrective regimen sliding scale   SubCutaneous three times a day before meals  insulin lispro (HumaLOG) corrective regimen sliding scale   SubCutaneous at bedtime  isosorbide   mononitrate ER Tablet (IMDUR) 120 milliGRAM(s) Oral daily  metoprolol tartrate 25 milliGRAM(s) Oral two times a day  pantoprazole    Tablet 40 milliGRAM(s) Oral before breakfast  ranolazine 500 milliGRAM(s) Oral two times a day  simvastatin 40 milliGRAM(s) Oral at bedtime      VITAL:  T(C): , Max: 37 (06-14-20 @ 21:41)  T(F): , Max: 98.6 (06-14-20 @ 21:41)  HR: 77 (06-15-20 @ 05:32)  BP: 113/65 (06-15-20 @ 05:32)  BP(mean): --  RR: 18 (06-15-20 @ 05:32)  SpO2: 96% (06-15-20 @ 05:32)  Wt(kg): --    I and O's:    06-14 @ 07:01  -  06-15 @ 07:00  --------------------------------------------------------  IN: 100 mL / OUT: 900 mL / NET: -800 mL          PHYSICAL EXAM:    Constitutional: NAD  Neck:  No JVD  Respiratory: CTAB/L  Cardiovascular: S1 and S2  Gastrointestinal: BS+, soft, NT/ND  Extremities: No peripheral edema  Neurological: A/O x 3, no focal deficits  Psychiatric: Normal mood, normal affect  : No Vickers  Skin: No rashes  Access: Not applicable    LABS:                        7.6    6.15  )-----------( 210      ( 15 Christophe 2020 06:15 )             24.6     06-15    138  |  96<L>  |  55<H>  ----------------------------<  176<H>  4.2   |  29  |  2.61<H>    Ca    8.9      15 Christophe 2020 06:15  Mg     2.1     06-15            Urine Studies:          RADIOLOGY & ADDITIONAL STUDIES:

## 2020-06-15 NOTE — PROGRESS NOTE ADULT - SUBJECTIVE AND OBJECTIVE BOX
Patient is a 89y old  Male who presents with a chief complaint of Chest pain (15 Christophe 2020 08:44)      INTERVAL HPI/OVERNIGHT EVENTS: no further chest pain     MEDICATIONS  (STANDING):  aspirin enteric coated 81 milliGRAM(s) Oral daily  cholecalciferol 1000 Unit(s) Oral daily  clopidogrel Tablet 75 milliGRAM(s) Oral daily  dextrose 5%. 1000 milliLiter(s) (50 mL/Hr) IV Continuous <Continuous>  dextrose 50% Injectable 12.5 Gram(s) IV Push once  dextrose 50% Injectable 25 Gram(s) IV Push once  dextrose 50% Injectable 25 Gram(s) IV Push once  epoetin rex-epbx (RETACRIT) Injectable 24240 Unit(s) SubCutaneous once  escitalopram 5 milliGRAM(s) Oral daily  folic acid 1 milliGRAM(s) Oral daily  furosemide    Tablet 40 milliGRAM(s) Oral daily  heparin   Injectable 5000 Unit(s) SubCutaneous three times a day  insulin lispro (HumaLOG) corrective regimen sliding scale   SubCutaneous three times a day before meals  insulin lispro (HumaLOG) corrective regimen sliding scale   SubCutaneous at bedtime  isosorbide   mononitrate ER Tablet (IMDUR) 120 milliGRAM(s) Oral daily  metoprolol tartrate 25 milliGRAM(s) Oral two times a day  pantoprazole    Tablet 40 milliGRAM(s) Oral before breakfast  ranolazine 500 milliGRAM(s) Oral two times a day  simvastatin 40 milliGRAM(s) Oral at bedtime    MEDICATIONS  (PRN):  aluminum hydroxide/magnesium hydroxide/simethicone Suspension 30 milliLiter(s) Oral every 4 hours PRN Dyspepsia  dextrose 40% Gel 15 Gram(s) Oral once PRN Blood Glucose LESS THAN 70 milliGRAM(s)/deciliter  glucagon  Injectable 1 milliGRAM(s) IntraMuscular once PRN Glucose LESS THAN 70 milligrams/deciliter            Allergies    No Known Allergies    Intolerances        REVIEW OF SYSTEMS:  CARDIOVASCULAR: No chest pain, palpitations, dizziness, or leg swelling; no shortness of breath     RESPIRATORY: No cough, wheezing, chills or hemoptysis; No shortness of breath    GASTROINTESTINAL: No abdominal or epigastric pain. No nausea, vomiting, or hematemesis; No diarrhea or constipation. No melena or hematochezia.    NEUROLOGICAL: No headaches, memory loss, loss of strength, numbness      PHYSICAL EXAM:  Vital Signs Last 24 Hrs  T(C): 36.4 (15 Christophe 2020 05:32), Max: 37 (14 Jun 2020 21:41)  T(F): 97.5 (15 Christophe 2020 05:32), Max: 98.6 (14 Jun 2020 21:41)  HR: 77 (15 Christophe 2020 05:32) (71 - 77)  BP: 113/65 (15 Christophe 2020 05:32) (110/60 - 121/59)  BP(mean): --  RR: 18 (15 Christophe 2020 05:32) (17 - 21)  SpO2: 96% (15 Christophe 2020 05:32) (96% - 98%)    GENERAL: NAD, well-groomed, well-developed  HEAD:  Atraumatic, Normocephalic  EYES: EOMI, PERRLA, conjunctiva and sclera clear  NECK: Supple, No JVD, Normal thyroid  NERVOUS SYSTEM:  Alert & Oriented X3, Good concentration;  and symmetric  CHEST/LUNG: Clear to auscultation bilaterally; No rales, rhonchi, wheezing, or rubs  HEART: Split S1S2 regular, without murmur, rub nor gallop  ABDOMEN: Soft, Nontender, Nondistended; Bowel sounds present  EXTREMITIES:  2+ Peripheral Pulses, trace  edema      LABS:                        7.6    6.15  )-----------( 210      ( 15 Christophe 2020 06:15 )             24.6     15 Christophe 2020 06:15    138    |  96     |  55     ----------------------------<  176    4.2     |  29     |  2.61     Ca    8.9        15 Christophe 2020 06:15  Mg     2.1       15 Christophe 2020 06:15        CAPILLARY BLOOD GLUCOSE      POCT Blood Glucose.: 228 mg/dL (14 Jun 2020 21:38)  POCT Blood Glucose.: 180 mg/dL (14 Jun 2020 16:56)  POCT Blood Glucose.: 208 mg/dL (14 Jun 2020 11:50)      TELEMETRY:    RADIOLOGY & ADDITIONAL TESTS:    Imaging Personally Reviewed:  [ ] YES     Consultant(s) Notes Reviewed:  nephrology      · Assessment		  89-year old gentleman with a past medical history of chronic systolic congestive heart failure, triple-vessel coronary artery disease, history of coronary angioplasty last 2016, not amenable to PCI or CABG, DM2, HTN, CKD3,  now admitted with recurrent  CP (?Due to non-compliance?)    · ACS   cont medical management for CAD .. nt candidate for aggressie treatment    Metoprolol to 25 bid.  Nitrates and ranexa.  Started on epogen to increase H/H, for better oxygen carrying capacity   Monitor  CBC.   See nephrology note.  WIll request Heme eval.   ambulate pt   Can advance Ranexa to 1000   Chronic systolic CHF (congestive heart failure).  Plan:  Continue to diurese   Change to PO Lasix       overall comfortable on NC     check f/u weight     ·  Problem: DM (diabetes mellitus).  Plan: Insulin sliding scale    ·  Problem: Essential hypertension.  Plan: monitor   cont meds See above.      D/c planning       pt had MOLST form  last admission       Attending Attestation:   40 minutes spent on total encounter; more than 50% of the visit was spent counseling and/or    Care Discussed with Consultants/Other Providers:

## 2020-06-16 NOTE — PROGRESS NOTE ADULT - ASSESSMENT
ASSESSMENT/RECOMMENDATION:  89 y.o M pmh HTN. DM2 not on insulin, CKD3, CAD c TVD not amenable to surgery or PCI, Chronic systolic CHF (severe LV Dysfunction), S/P PPM (Medtronic) and discharged 6/9 after admission for NSTEMI presents with chest pain  CKD stage 4 at present.  Given the age the exact etiology is not important  Anemia needs to be fixed in order to help with sx of chest pain   Secondary hyperparathyroidism     1 Renal - Azotemia improving. Lasix  40mg po qd.  At present he is not in heart failure    No need for Rocaltrol at present      2 Anemia- slightly higher; s/p retacrit x 1 yesterday; Venofer 100mg IV x 1 this AM     3 CVS-No in heart failure at present;  No active chest pain at present      ESA WadsworthC  [facility]   [phone] ASSESSMENT/RECOMMENDATION:  89 y.o M pmh HTN. DM2 not on insulin, CKD3, CAD c TVD not amenable to surgery or PCI, Chronic systolic CHF (severe LV Dysfunction), S/P PPM (Medtronic) and discharged 6/9 after admission for NSTEMI presents with chest pain  CKD stage 4 at present.  Given the age the exact etiology is not important  Anemia needs to be fixed in order to help with sx of chest pain   Secondary hyperparathyroidism     1 Renal - Azotemia improving. Lasix  40mg po qd.  At present he is not in heart failure    No need for Rocaltrol at present      2 Anemia- slightly higher; s/p retacrit x 1 yesterday; Venofer 100mg IV x 1 this AM     3 CVS-No in heart failure at present;  No active chest pain at present      ESA WadsworthC  [facility]   [phone]

## 2020-06-16 NOTE — PROGRESS NOTE ADULT - SUBJECTIVE AND OBJECTIVE BOX
NEPHROLOGY         MEDICATIONS  (STANDING):  aspirin enteric coated 81 milliGRAM(s) Oral daily  cholecalciferol 1000 Unit(s) Oral daily  clopidogrel Tablet 75 milliGRAM(s) Oral daily  dextrose 5%. 1000 milliLiter(s) (50 mL/Hr) IV Continuous <Continuous>  dextrose 50% Injectable 12.5 Gram(s) IV Push once  dextrose 50% Injectable 25 Gram(s) IV Push once  dextrose 50% Injectable 25 Gram(s) IV Push once  escitalopram 5 milliGRAM(s) Oral daily  folic acid 1 milliGRAM(s) Oral daily  furosemide    Tablet 40 milliGRAM(s) Oral daily  heparin   Injectable 5000 Unit(s) SubCutaneous three times a day  insulin lispro (HumaLOG) corrective regimen sliding scale   SubCutaneous three times a day before meals  insulin lispro (HumaLOG) corrective regimen sliding scale   SubCutaneous at bedtime  isosorbide   mononitrate ER Tablet (IMDUR) 120 milliGRAM(s) Oral daily  metoprolol tartrate 25 milliGRAM(s) Oral two times a day  pantoprazole    Tablet 40 milliGRAM(s) Oral before breakfast  ranolazine 1000 milliGRAM(s) Oral two times a day  simvastatin 40 milliGRAM(s) Oral at bedtime    VITALS:  T(C): , Max: 36.6 (06-15-20 @ 14:44)  T(F): , Max: 97.9 (06-15-20 @ 14:44)  HR: 67 (06-16-20 @ 10:45)  BP: 111/63 (06-16-20 @ 10:45)  BP(mean): 63 (06-15-20 @ 17:44)  RR: 16 (06-16-20 @ 10:45)  SpO2: 100% (06-16-20 @ 10:45)    I and O's:    06-15 @ 07:01  -  06-16 @ 07:00  --------------------------------------------------------  IN: 150 mL / OUT: 825 mL / NET: -675 mL    PHYSICAL EXAM:  Constitutional: NAD  Neck:  No JVD  Respiratory: CTAB/L  Cardiovascular: S1 and S2  Gastrointestinal: BS+, soft, NT/ND  Extremities: No peripheral edema  Neurological: A/O x 3, no focal deficits  Psychiatric: Normal mood, normal affect  : No Vickers  Skin: No rashes  Access: Not applicable    LABS:                        7.8    6.08  )-----------( 203      ( 16 Jun 2020 07:00 )             25.1     06-16    136  |  96<L>  |  55<H>  ----------------------------<  189<H>  4.9   |  30  |  2.26<H>    Ca    9.0      16 Jun 2020 07:00  Mg     2.1     06-16 NEPHROLOGY     Pt seen and examined. Pt reports feeling fine, no complaints offered. No overnight events noted.     MEDICATIONS  (STANDING):  aspirin enteric coated 81 milliGRAM(s) Oral daily  cholecalciferol 1000 Unit(s) Oral daily  clopidogrel Tablet 75 milliGRAM(s) Oral daily  dextrose 5%. 1000 milliLiter(s) (50 mL/Hr) IV Continuous <Continuous>  dextrose 50% Injectable 12.5 Gram(s) IV Push once  dextrose 50% Injectable 25 Gram(s) IV Push once  dextrose 50% Injectable 25 Gram(s) IV Push once  escitalopram 5 milliGRAM(s) Oral daily  folic acid 1 milliGRAM(s) Oral daily  furosemide    Tablet 40 milliGRAM(s) Oral daily  heparin   Injectable 5000 Unit(s) SubCutaneous three times a day  insulin lispro (HumaLOG) corrective regimen sliding scale   SubCutaneous three times a day before meals  insulin lispro (HumaLOG) corrective regimen sliding scale   SubCutaneous at bedtime  isosorbide   mononitrate ER Tablet (IMDUR) 120 milliGRAM(s) Oral daily  metoprolol tartrate 25 milliGRAM(s) Oral two times a day  pantoprazole    Tablet 40 milliGRAM(s) Oral before breakfast  ranolazine 1000 milliGRAM(s) Oral two times a day  simvastatin 40 milliGRAM(s) Oral at bedtime    VITALS:  T(C): , Max: 36.6 (06-15-20 @ 14:44)  T(F): , Max: 97.9 (06-15-20 @ 14:44)  HR: 67 (06-16-20 @ 10:45)  BP: 111/63 (06-16-20 @ 10:45)  BP(mean): 63 (06-15-20 @ 17:44)  RR: 16 (06-16-20 @ 10:45)  SpO2: 100% (06-16-20 @ 10:45)    I and O's:    06-15 @ 07:01  -  06-16 @ 07:00  --------------------------------------------------------  IN: 150 mL / OUT: 825 mL / NET: -675 mL    PHYSICAL EXAM:  Constitutional: NAD  Neck:  No JVD  Respiratory: CTAB/L  Cardiovascular: S1 and S2  Gastrointestinal: BS+, soft, NT/ND  Extremities: No peripheral edema  Neurological: A/O x 3, no focal deficits  Psychiatric: Normal mood, normal affect  : No Vickers  Skin: No rashes  Access: Not applicable    LABS:                        7.8    6.08  )-----------( 203      ( 16 Jun 2020 07:00 )             25.1     06-16    136  |  96<L>  |  55<H>  ----------------------------<  189<H>  4.9   |  30  |  2.26<H>    Ca    9.0      16 Jun 2020 07:00  Mg     2.1     06-16

## 2020-06-16 NOTE — PROGRESS NOTE ADULT - SUBJECTIVE AND OBJECTIVE BOX
Patient is a 89y old  Male who presents with a chief complaint of Chest pain (16 Jun 2020 08:48)      INTERVAL HPI/OVERNIGHT EVENTS:    MEDICATIONS  (STANDING):  aspirin enteric coated 81 milliGRAM(s) Oral daily  cholecalciferol 1000 Unit(s) Oral daily  clopidogrel Tablet 75 milliGRAM(s) Oral daily  dextrose 5%. 1000 milliLiter(s) (50 mL/Hr) IV Continuous <Continuous>  dextrose 50% Injectable 12.5 Gram(s) IV Push once  dextrose 50% Injectable 25 Gram(s) IV Push once  dextrose 50% Injectable 25 Gram(s) IV Push once  escitalopram 5 milliGRAM(s) Oral daily  folic acid 1 milliGRAM(s) Oral daily  furosemide    Tablet 40 milliGRAM(s) Oral daily  heparin   Injectable 5000 Unit(s) SubCutaneous three times a day  insulin lispro (HumaLOG) corrective regimen sliding scale   SubCutaneous three times a day before meals  insulin lispro (HumaLOG) corrective regimen sliding scale   SubCutaneous at bedtime  iron sucrose IVPB 100 milliGRAM(s) IV Intermittent once  isosorbide   mononitrate ER Tablet (IMDUR) 120 milliGRAM(s) Oral daily  metoprolol tartrate 25 milliGRAM(s) Oral two times a day  pantoprazole    Tablet 40 milliGRAM(s) Oral before breakfast  ranolazine 1000 milliGRAM(s) Oral two times a day  simvastatin 40 milliGRAM(s) Oral at bedtime    MEDICATIONS  (PRN):  aluminum hydroxide/magnesium hydroxide/simethicone Suspension 30 milliLiter(s) Oral every 4 hours PRN Dyspepsia  dextrose 40% Gel 15 Gram(s) Oral once PRN Blood Glucose LESS THAN 70 milliGRAM(s)/deciliter  glucagon  Injectable 1 milliGRAM(s) IntraMuscular once PRN Glucose LESS THAN 70 milligrams/deciliter            Allergies    No Known Allergies    Intolerances        REVIEW OF SYSTEMS:  CARDIOVASCULAR: No chest pain, palpitations, dizziness, or leg swelling; no shortness of breath     RESPIRATORY: No cough, wheezing, chills or hemoptysis; No shortness of breath    GASTROINTESTINAL: No abdominal or epigastric pain. No nausea, vomiting, or hematemesis; No diarrhea or constipation. No melena or hematochezia.    NEUROLOGICAL: No headaches, memory loss, loss of strength, numbness      PHYSICAL EXAM:  Vital Signs Last 24 Hrs  T(C): 36.4 (16 Jun 2020 05:58), Max: 36.6 (15 Christophe 2020 14:44)  T(F): 97.6 (16 Jun 2020 05:58), Max: 97.9 (15 Christophe 2020 14:44)  HR: 69 (16 Jun 2020 05:58) (69 - 74)  BP: 133/94 (16 Jun 2020 05:58) (103/50 - 133/94)  BP(mean): 63 (15 Christophe 2020 17:44) (63 - 63)  RR: 17 (16 Jun 2020 05:58) (16 - 19)  SpO2: 100% (16 Jun 2020 05:58) (96% - 100%)    GENERAL: NAD, well-groomed, well-developed  comfortable on room air   HEAD:  Atraumatic, Normocephalic  EYES: EOMI, PERRLA, conjunctiva and sclera clear  NECK: Supple, No JVD, Normal thyroid  NERVOUS SYSTEM:  Alert & Oriented X3, Good concentration;  and symmetric  CHEST/LUNG: Clear to auscultation bilaterally; No rales, rhonchi, wheezing, or rubs  HEART: S4S1S2 regular, with I-II/VI systolic murmur left sternal border, without rub  ABDOMEN: Soft, Nontender, Nondistended; Bowel sounds present  EXTREMITIES:  Mild pretibial edema     LABS:                        7.8    6.08  )-----------( 203      ( 16 Jun 2020 07:00 )             25.1     16 Jun 2020 07:00    136    |  96     |  55     ----------------------------<  189    4.9     |  30     |  2.26     Ca    9.0        16 Jun 2020 07:00  Mg     2.1       16 Jun 2020 07:00        CAPILLARY BLOOD GLUCOSE      POCT Blood Glucose.: 199 mg/dL (16 Jun 2020 07:44)  POCT Blood Glucose.: 210 mg/dL (15 Christophe 2020 21:39)  POCT Blood Glucose.: 145 mg/dL (15 Christophe 2020 16:53)  POCT Blood Glucose.: 258 mg/dL (15 Christophe 2020 12:41)  POCT Blood Glucose.: 268 mg/dL (15 Christophe 2020 09:21)      TELEMETRY:    RADIOLOGY & ADDITIONAL TESTS:      Consultant(s) Notes Reviewed:  cardiology         assessment:   to my exam, minnimal fluid overload; care to diurese given renal dysfunction.  Given severe LV dysfunction and MR, heart failure can be expected, though his oxygen saturations are execellent  Should check f/u weight and BNP    D/W Heme:  He was hemeoccult positive in February.   Will recheck stool for occult blood.  Started on IV iron.

## 2020-06-16 NOTE — CONSULT NOTE ADULT - SUBJECTIVE AND OBJECTIVE BOX
Chief Complaint:  Patient is a 89y old  Male who presents with a chief complaint of Chest pain (15 Christophe 2020 10:50)      HPI: patient with anemia and asked to evaluate. Reviewed prior admission labs and it has been somewhat of a chronic process, although Hgb lower.  He does have kidney disease and he was given a dose of Retacrit yesterday.  On a prior admission he had evidence of heme occult positive stool.  He denies seeing any bleeding.  He does not recall when he last had a colonoscopy.      Medications:  aluminum hydroxide/magnesium hydroxide/simethicone Suspension 30 milliLiter(s) Oral every 4 hours PRN  aspirin enteric coated 81 milliGRAM(s) Oral daily  cholecalciferol 1000 Unit(s) Oral daily  clopidogrel Tablet 75 milliGRAM(s) Oral daily  dextrose 40% Gel 15 Gram(s) Oral once PRN  dextrose 5%. 1000 milliLiter(s) IV Continuous <Continuous>  dextrose 50% Injectable 12.5 Gram(s) IV Push once  dextrose 50% Injectable 25 Gram(s) IV Push once  dextrose 50% Injectable 25 Gram(s) IV Push once  escitalopram 5 milliGRAM(s) Oral daily  folic acid 1 milliGRAM(s) Oral daily  furosemide    Tablet 40 milliGRAM(s) Oral daily  glucagon  Injectable 1 milliGRAM(s) IntraMuscular once PRN  heparin   Injectable 5000 Unit(s) SubCutaneous three times a day  insulin lispro (HumaLOG) corrective regimen sliding scale   SubCutaneous three times a day before meals  insulin lispro (HumaLOG) corrective regimen sliding scale   SubCutaneous at bedtime  isosorbide   mononitrate ER Tablet (IMDUR) 120 milliGRAM(s) Oral daily  metoprolol tartrate 25 milliGRAM(s) Oral two times a day  pantoprazole    Tablet 40 milliGRAM(s) Oral before breakfast  ranolazine 1000 milliGRAM(s) Oral two times a day  simvastatin 40 milliGRAM(s) Oral at bedtime        Allergies:  No Known Allergies      Family history of coronary artery disease  Family history of diabetes mellitus  Denies any family history of cancer      Social history: he says that he lives alone and he cares for himself.  he has two sons.  he denies any tobacco, ETOH, and IVDA    PAST MEDICAL & SURGICAL HISTORY:  History of bradycardia: s/p medtronic PPM  Congestive heart failure  Essential hypertension  DM (diabetes mellitus)  CAD (coronary artery disease): triple vessel disease  History of permanent cardiac pacemaker placement: Medtronic  H/O coronary angioplasty: last 2016, not amenable to PCI or CABG    REVIEW OF SYSTEMS      General: Appetite is okay.  Some weight loss.  Has fatigue.  Denies fevers, chills, sweats	    Skin/Breast: Denies rash, bruising, itch  	  Ophthalmologic: Denies vision chnages  	  ENMT:	Mild hearing loss.  denies nosebleeds and sore throat    Respiratory and Thorax: Breathing heavy at times.  No cough, wheeze, hemoptysis  	  Cardiovascular:	CP resolved.  No palpitations    Gastrointestinal:	Denies N/V/D/C, abd pain, dark stool    Genitourinary:	Denies dysuria and hematuria    Musculoskeletal:	 Denies back pain, leg pain, swelling    Neurological:	Denies HA, dizziness, tingling  	  Vitals:  Vital Signs Last 24 Hrs  T(C): 36.4 (16 Jun 2020 05:58), Max: 36.6 (15 Christophe 2020 14:44)  T(F): 97.6 (16 Jun 2020 05:58), Max: 97.9 (15 Christophe 2020 14:44)  HR: 69 (16 Jun 2020 05:58) (69 - 74)  BP: 133/94 (16 Jun 2020 05:58) (103/50 - 133/94)  BP(mean): 63 (15 Christophe 2020 17:44) (63 - 63)  RR: 17 (16 Jun 2020 05:58) (16 - 19)  SpO2: 100% (16 Jun 2020 05:58) (96% - 100%)    Pex:  alert NAD  Mild ecchymoses on arms.    EOMI anicteric sclera  Neck Supple No LNA  Cv s1 S2 RRR  Lungs clear B/L  abd soft NT ND +BS  No LE edema or tenderness  Aox3, moves all extremities    Labs:                        7.8    6.08  )-----------( 203      ( 16 Jun 2020 07:00 )             25.1     CBC Full  -  ( 16 Jun 2020 07:00 )  WBC Count : 6.08 K/uL  RBC Count : 2.73 M/uL  Hemoglobin : 7.8 g/dL  Hematocrit : 25.1 %  Platelet Count - Automated : 203 K/uL  Mean Cell Volume : 91.9 fL  Mean Cell Hemoglobin : 28.6 pg  Mean Cell Hemoglobin Concentration : 31.1 %  Auto Neutrophil # : x  Auto Lymphocyte # : x  Auto Monocyte # : x  Auto Eosinophil # : x  Auto Basophil # : x  Auto Neutrophil % : x  Auto Lymphocyte % : x  Auto Monocyte % : x  Auto Eosinophil % : x  Auto Basophil % : x    06-16    136  |  96<L>  |  55<H>  ----------------------------<  189<H>  4.9   |  30  |  2.26<H>    Ca    9.0      16 Jun 2020 07:00  Mg     2.1     06-16    iron   22  [facility] 298    ferritin    62    [id number]

## 2020-06-16 NOTE — PROGRESS NOTE ADULT - ASSESSMENT
Assessment and Plan:    89 year old M pt with PMH of HTN, HLD, DM, known CAD with multi-vessel disease on [facility] in 2016 without lesions amenable for PCI/CABG per report presented from home with chest pressure and dyspnea.  He had recent NSTEMI, still on medical therapy.  I think his symptoms are mostly due to a CHF exacerbation, may also be related to noncompliance with anti-anginals.    1. Acute on chronic heart failure, likely cardiorenal LEONCIO  - on my exam patient continues to appear volume overloaded would give 80 mg IV lasix bid  - recommend compression stockings or ace wraps to improve lower extremity edema  - strict ins and outs, daily wts  - fluids and sodium restriction  - monitor on Tele  - replete electrolytes: goal K>4 and Mg>2    2. CAD, recent NSTEMI  - continue home meds ASA/Plavix/metop/imdur/ranexa  - no need to trend enzymes further    3. HTN   - well controlled, c/w current antiHTN medications    4. Normocytic anemia: likely 2/2 CKD  - s/p retacrit (6/15), venofer  - heme onc following, management by primary team    Heather Zhao MD [facility]  PGY-1 Internal Medicine  [facility] Pager: [phone] | NS: [phone] Assessment and Plan:    89 year old M pt with PMH of HTN, HLD, DM, known CAD with multi-vessel disease on [facility] in 2016 without lesions amenable for PCI/CABG per report presented from home with chest pressure and dyspnea.  He had recent NSTEMI, still on medical therapy.  I think his symptoms are mostly due to a CHF exacerbation, may also be related to noncompliance with anti-anginals.     1. Acute on chronic heart failure, likely cardiorenal LEONCIO  - on exam 6/16, patient continues to appear volume overloaded (+JVP, pitting edema in thigh/sacrum) would give 80 mg IV lasix bid to optimize fluid status  - c/w compression stockings or ace wraps to improve lower extremity edema  - no need to trend pro-BNPs  - strict ins and outs, daily wts  - fluids and sodium restriction  - monitor on Tele  - replete electrolytes: goal K>4 and Mg>2    2. CAD, recent NSTEMI  - continue medical management ASA/Plavix/metop 25 bid/imdur/ranexa  - not amenable to other more invasive interventions  - no need to trend enzymes further    3. HTN   - well controlled, c/w current antiHTN medications    4. Normocytic anemia: likely 2/2 CKD  - s/p retacrit (6/15), venofer (6/16)  - management by primary team    Heather Zhao MD [facility]  PGY-1 Internal Medicine  [facility] Pager: [phone] | NS: [phone] Assessment and Plan:    89 year old M pt with PMH of HTN, HLD, DM, known CAD with multi-vessel disease on [facility] in 2016 without lesions amenable for PCI/CABG per report presented from home with chest pressure and dyspnea.  He had recent NSTEMI, still on medical therapy.  I think his symptoms are mostly due to a CHF exacerbation, may also be related to noncompliance with anti-anginals.     1. Acute on chronic heart failure, likely cardiorenal LEONCIO  - on exam 6/16, patient continues to appear volume overloaded (+JVP, pitting edema in thigh/sacrum) would give 80 mg IV lasix qD to optimize fluid status  - c/w compression stockings or ace wraps to improve lower extremity edema  - no need to trend pro-BNPs  - strict ins and outs, daily wts  - fluids and sodium restriction  - monitor on Tele  - replete electrolytes: goal K>4 and Mg>2    2. CAD, recent NSTEMI  - continue medical management ASA/Plavix/metop 25 bid/imdur/ranexa  - not amenable to other more invasive interventions  - no need to trend enzymes further    3. HTN   - well controlled, c/w current antiHTN medications    4. Normocytic anemia: likely 2/2 CKD  - s/p retacrit (6/15), venofer (6/16)  - management by primary team    Heather Zhao MD [facility]  PGY-1 Internal Medicine  [facility] Pager: [phone] | NS: [phone]

## 2020-06-16 NOTE — CONSULT NOTE ADULT - ASSESSMENT
patient with a normocytic anemia.  He has CKD so there is a component of AOCD, but he also has iron deficiency with a ferritin of 62 in setting of kidney disease.  he got a dose of Retacrit yesterday but ideally the ferritin should be above 100.  Will give a dose of venofer.  Will also order a further anemia evaluation.  On a prior admission he did have heme occult positive stool so he should have a GI evaluation, but at his age not sure that he and family will be interested in it.  Would get a GI evaluation.

## 2020-06-16 NOTE — PROGRESS NOTE ADULT - ATTENDING COMMENTS
Personally saw and examined patient  labs, vitals reviewed  agree with above assessment and plan
still with evidence of volume overload.  would benefit from additional IV diuretic as above
Patient seen and examined  labs/vitals reviewed   agree with assessment and plan above  remains grossly volume overloaded on exam, Cr improving with diuresis as well  cont IV diuresis for one more day  will cont to follow
remains volume overloaded with at least moderately elevated JVP and 1+ pitting edema of the upper thigh, suspect partly due to low albumin with a component of third-spacing.  renal function overall stable, though GFR has decreased from initial presentation.    consider IV diuretics while inpt to optimize fluid status prior to dispo.

## 2020-06-16 NOTE — PROGRESS NOTE ADULT - SUBJECTIVE AND OBJECTIVE BOX
Patient seen and examined at bedside.    Overnight Events:  no issues overnight    ROS: no chest pain/shortness of breath    MEDICATIONS  (STANDING):  aspirin enteric coated 81 milliGRAM(s) Oral daily  cholecalciferol 1000 Unit(s) Oral daily  clopidogrel Tablet 75 milliGRAM(s) Oral daily  dextrose 5%. 1000 milliLiter(s) (50 mL/Hr) IV Continuous <Continuous>  dextrose 50% Injectable 12.5 Gram(s) IV Push once  dextrose 50% Injectable 25 Gram(s) IV Push once  dextrose 50% Injectable 25 Gram(s) IV Push once  escitalopram 5 milliGRAM(s) Oral daily  folic acid 1 milliGRAM(s) Oral daily  furosemide    Tablet 40 milliGRAM(s) Oral daily  heparin   Injectable 5000 Unit(s) SubCutaneous three times a day  insulin lispro (HumaLOG) corrective regimen sliding scale   SubCutaneous three times a day before meals  insulin lispro (HumaLOG) corrective regimen sliding scale   SubCutaneous at bedtime  iron sucrose IVPB 100 milliGRAM(s) IV Intermittent once  isosorbide   mononitrate ER Tablet (IMDUR) 120 milliGRAM(s) Oral daily  metoprolol tartrate 25 milliGRAM(s) Oral two times a day  pantoprazole    Tablet 40 milliGRAM(s) Oral before breakfast  ranolazine 1000 milliGRAM(s) Oral two times a day  simvastatin 40 milliGRAM(s) Oral at bedtime    MEDICATIONS  (PRN):  aluminum hydroxide/magnesium hydroxide/simethicone Suspension 30 milliLiter(s) Oral every 4 hours PRN Dyspepsia  dextrose 40% Gel 15 Gram(s) Oral once PRN Blood Glucose LESS THAN 70 milliGRAM(s)/deciliter  glucagon  Injectable 1 milliGRAM(s) IntraMuscular once PRN Glucose LESS THAN 70 milligrams/deciliter    Allergies    No Known Allergies    Intolerances        VITAL SIGNS:  ICU Vital Signs Last 24 Hrs  T(C): 36.4 (16 Jun 2020 05:58), Max: 36.6 (15 Christophe 2020 14:44)  T(F): 97.6 (16 Jun 2020 05:58), Max: 97.9 (15 Christophe 2020 14:44)  HR: 69 (16 Jun 2020 05:58) (69 - 74)  BP: 133/94 (16 Jun 2020 05:58) (103/50 - 133/94)  BP(mean): 63 (15 Christophe 2020 17:44) (63 - 63)  ABP: --  ABP(mean): --  RR: 17 (16 Jun 2020 05:58) (16 - 18)  SpO2: 100% (16 Jun 2020 05:58) (97% - 100%)      15 Christophe 2020 07:01  -  16 Jun 2020 07:00  --------------------------------------------------------  IN:    Oral Fluid: 150 mL  Total IN: 150 mL    OUT:    Voided: 825 mL  Total OUT: 825 mL    Total NET: -675 mL        Physical Exam:  GENERAL: No acute distress, elderly frail, on RA  HEAD:  Atraumatic, Normocephalic  ENT: EOMI, Neck supple, + JVD, moist mucosa  CHEST/LUNG: Decreased breath sounds in lower lung fields (improving)   BACK: No spinal tenderness  HEART: Regular rate and rhythm; No murmurs, rubs, or gallops  ABDOMEN: Soft, Nontender, Nondistended; Bowel sounds present  EXTREMITIES:  No clubbing, cyanosis, 2+ pitting edema up to thighs  PSYCH: Nl behavior, nl affect  NEUROLOGY: AAOx3, non-focal,  SKIN: Normal color, No rashes or lesions    LABS:                        7.8    6.08  )-----------( 203      ( 16 Jun 2020 07:00 )             25.1                         7.6    6.15  )-----------( 210      ( 15 Christophe 2020 06:15 )             24.6                         8.2    6.24  )-----------( 223      ( 14 Jun 2020 06:10 )             27.0           06-16    136  |  96<L>  |  55<H>  ----------------------------<  189<H>  4.9   |  30  |  2.26<H>  06-15    138  |  96<L>  |  55<H>  ----------------------------<  176<H>  4.2   |  29  |  2.61<H>  06-14    139  |  96<L>  |  48<H>  ----------------------------<  212<H>  4.0   |  29  |  2.21<H>    Ca    9.0      16 Jun 2020 07:00  Ca    8.9      15 Christophe 2020 06:15  Ca    9.1      14 Jun 2020 06:10  Mg     2.1     06-16          CAPILLARY BLOOD GLUCOSE      POCT Blood Glucose.: 199 mg/dL (16 Jun 2020 07:44)  POCT Blood Glucose.: 210 mg/dL (15 Christophe 2020 21:39)  POCT Blood Glucose.: 145 mg/dL (15 Christophe 2020 16:53)  POCT Blood Glucose.: 258 mg/dL (15 Christophe 2020 12:41)            IMAGING: Radiology personally reviewed    Echo:  < from: Transthoracic Echocardiogram (02.19.20 @ 12:19) >  CONCLUSIONS:  1. Tethered mitral valve leaflets with normal opening.  Moderate-severe mitral regurgitation.  2. Calcified trileaflet aortic valve with normal opening.  Mild aortic regurgitation.  3. Severe global left ventricular systolic dysfunction.  4. Right ventricular enlargement with decreased right  ventricular systolic function.  5. Normal tricuspid valve. Moderate tricuspid  regurgitation.  6. Estimated pulmonary artery systolic pressure equals 50  mm Hg, assuming right atrial pressure equals 10  mm Hg,  consistent with moderate pulmonary hypertension.  ------------------------------------------------------------------------  Confirmed on  2/19/2020 - 13:12:47 by Jada Burns MD    < end of copied text >    Cath:  < from: Cardiac Cath Lab - Adult (11.17.16 @ 13:49) >  CORONARY VESSELS: The coronary circulation is right dominant.  LM:   --  LM: Normal.  LAD:   --  Mid LAD: There was a 60 % stenosis. In a second lesion, there  was a 99 % stenosis. The lesion was eccentric andmoderately calcified.  --  Distal LAD: There was a diffuse 50 % stenosis. The lesion was  calcified.  --  D1: There was a 75 % stenosis in the middle third of the vessel  segment. There was a small vascular territory distal to the lesion.  CX:   --  Proximal circumflex: There was a 75 % stenosis. The lesion was  complex.  RCA:   --  Proximal RCA: There was a 100 % stenosis. There was poor  collateral blood supply to the distal myocardium.  COMPLICATIONS: No complications occurred during the cath lab visit.  DIAGNOSTIC IMPRESSIONS: Patient has severe diffsue coronary arterey disea  involving all three coronaries, no ideal for intervention or CABG.  DIAGNOSTIC RECOMMENDATIONS: Aggressive medical therapy and risk factor  modification.  Prepared and signed by  Lior Rivera M.D.    Interpretation of Telemetry: sinus 70-80s with PVCs Patient seen and examined at bedside.    Overnight Events:  no issues overnight  Tele: intermittently V-paced    ROS: no chest pain/shortness of breath    MEDICATIONS  (STANDING):  aspirin enteric coated 81 milliGRAM(s) Oral daily  cholecalciferol 1000 Unit(s) Oral daily  clopidogrel Tablet 75 milliGRAM(s) Oral daily  dextrose 5%. 1000 milliLiter(s) (50 mL/Hr) IV Continuous <Continuous>  dextrose 50% Injectable 12.5 Gram(s) IV Push once  dextrose 50% Injectable 25 Gram(s) IV Push once  dextrose 50% Injectable 25 Gram(s) IV Push once  escitalopram 5 milliGRAM(s) Oral daily  folic acid 1 milliGRAM(s) Oral daily  furosemide    Tablet 40 milliGRAM(s) Oral daily  heparin   Injectable 5000 Unit(s) SubCutaneous three times a day  insulin lispro (HumaLOG) corrective regimen sliding scale   SubCutaneous three times a day before meals  insulin lispro (HumaLOG) corrective regimen sliding scale   SubCutaneous at bedtime  iron sucrose IVPB 100 milliGRAM(s) IV Intermittent once  isosorbide   mononitrate ER Tablet (IMDUR) 120 milliGRAM(s) Oral daily  metoprolol tartrate 25 milliGRAM(s) Oral two times a day  pantoprazole    Tablet 40 milliGRAM(s) Oral before breakfast  ranolazine 1000 milliGRAM(s) Oral two times a day  simvastatin 40 milliGRAM(s) Oral at bedtime    MEDICATIONS  (PRN):  aluminum hydroxide/magnesium hydroxide/simethicone Suspension 30 milliLiter(s) Oral every 4 hours PRN Dyspepsia  dextrose 40% Gel 15 Gram(s) Oral once PRN Blood Glucose LESS THAN 70 milliGRAM(s)/deciliter  glucagon  Injectable 1 milliGRAM(s) IntraMuscular once PRN Glucose LESS THAN 70 milligrams/deciliter    Allergies    No Known Allergies    Intolerances        VITAL SIGNS:  ICU Vital Signs Last 24 Hrs  T(C): 36.4 (16 Jun 2020 05:58), Max: 36.6 (15 Christophe 2020 14:44)  T(F): 97.6 (16 Jun 2020 05:58), Max: 97.9 (15 Christophe 2020 14:44)  HR: 69 (16 Jun 2020 05:58) (69 - 74)  BP: 133/94 (16 Jun 2020 05:58) (103/50 - 133/94)  BP(mean): 63 (15 Christophe 2020 17:44) (63 - 63)  ABP: --  ABP(mean): --  RR: 17 (16 Jun 2020 05:58) (16 - 18)  SpO2: 100% (16 Jun 2020 05:58) (97% - 100%)      15 Christophe 2020 07:01  -  16 Jun 2020 07:00  --------------------------------------------------------  IN:    Oral Fluid: 150 mL  Total IN: 150 mL    OUT:    Voided: 825 mL  Total OUT: 825 mL    Total NET: -675 mL        Physical Exam:  GENERAL: No acute distress, elderly frail, on RA  HEAD:  Atraumatic, Normocephalic  ENT: EOMI, Neck supple, + JVD, moist mucosa  CHEST/LUNG: Decreased breath sounds in lower lung fields (improving)   BACK: No spinal tenderness  HEART: Regular rate and rhythm; No murmurs, rubs, or gallops  ABDOMEN: Soft, Nontender, Nondistended; Bowel sounds present  EXTREMITIES:  No clubbing, cyanosis, 2+ pitting edema up to thighs  PSYCH: Nl behavior, nl affect  NEUROLOGY: AAOx3, non-focal,  SKIN: Normal color, No rashes or lesions    LABS:                        7.8    6.08  )-----------( 203      ( 16 Jun 2020 07:00 )             25.1                         7.6    6.15  )-----------( 210      ( 15 Christophe 2020 06:15 )             24.6                         8.2    6.24  )-----------( 223      ( 14 Jun 2020 06:10 )             27.0           06-16    136  |  96<L>  |  55<H>  ----------------------------<  189<H>  4.9   |  30  |  2.26<H>  06-15    138  |  96<L>  |  55<H>  ----------------------------<  176<H>  4.2   |  29  |  2.61<H>  06-14    139  |  96<L>  |  48<H>  ----------------------------<  212<H>  4.0   |  29  |  2.21<H>    Ca    9.0      16 Jun 2020 07:00  Ca    8.9      15 Christophe 2020 06:15  Ca    9.1      14 Jun 2020 06:10  Mg     2.1     06-16          CAPILLARY BLOOD GLUCOSE      POCT Blood Glucose.: 199 mg/dL (16 Jun 2020 07:44)  POCT Blood Glucose.: 210 mg/dL (15 Christophe 2020 21:39)  POCT Blood Glucose.: 145 mg/dL (15 Christophe 2020 16:53)  POCT Blood Glucose.: 258 mg/dL (15 Christophe 2020 12:41)            IMAGING: Radiology personally reviewed    Echo:  < from: Transthoracic Echocardiogram (02.19.20 @ 12:19) >  CONCLUSIONS:  1. Tethered mitral valve leaflets with normal opening.  Moderate-severe mitral regurgitation.  2. Calcified trileaflet aortic valve with normal opening.  Mild aortic regurgitation.  3. Severe global left ventricular systolic dysfunction.  4. Right ventricular enlargement with decreased right  ventricular systolic function.  5. Normal tricuspid valve. Moderate tricuspid  regurgitation.  6. Estimated pulmonary artery systolic pressure equals 50  mm Hg, assuming right atrial pressure equals 10  mm Hg,  consistent with moderate pulmonary hypertension.  ------------------------------------------------------------------------  Confirmed on  2/19/2020 - 13:12:47 by Jada Burns MD    < end of copied text >    Cath:  < from: Cardiac Cath Lab - Adult (11.17.16 @ 13:49) >  CORONARY VESSELS: The coronary circulation is right dominant.  LM:   --  LM: Normal.  LAD:   --  Mid LAD: There was a 60 % stenosis. In a second lesion, there  was a 99 % stenosis. The lesion was eccentric and moderately calcified.  --  Distal LAD: There was a diffuse 50 % stenosis. The lesion was  calcified.  --  D1: There was a 75 % stenosis in the middle third of the vessel  segment. There was a small vascular territory distal to the lesion.  CX:   --  Proximal circumflex: There was a 75 % stenosis. The lesion was  complex.  RCA:   --  Proximal RCA: There was a 100 % stenosis. There was poor  collateral blood supply to the distal myocardium.  COMPLICATIONS: No complications occurred during the cath lab visit.  DIAGNOSTIC IMPRESSIONS: Patient has severe diffsue coronary arterey disea  involving all three coronaries, not ideal for intervention or CABG.  DIAGNOSTIC RECOMMENDATIONS: Aggressive medical therapy and risk factor  modification.  Prepared and signed by  Lior Rivera M.D.    Interpretation of Telemetry: sinus 70-80s with PVCs Patient seen and examined at bedside.    Overnight Events:  no issues overnight  Tele: intermittently V-paced    ROS: no chest pain/shortness of breath    MEDICATIONS  (STANDING):  aspirin enteric coated 81 milliGRAM(s) Oral daily  cholecalciferol 1000 Unit(s) Oral daily  clopidogrel Tablet 75 milliGRAM(s) Oral daily  dextrose 5%. 1000 milliLiter(s) (50 mL/Hr) IV Continuous <Continuous>  dextrose 50% Injectable 12.5 Gram(s) IV Push once  dextrose 50% Injectable 25 Gram(s) IV Push once  dextrose 50% Injectable 25 Gram(s) IV Push once  escitalopram 5 milliGRAM(s) Oral daily  folic acid 1 milliGRAM(s) Oral daily  furosemide    Tablet 40 milliGRAM(s) Oral daily  heparin   Injectable 5000 Unit(s) SubCutaneous three times a day  insulin lispro (HumaLOG) corrective regimen sliding scale   SubCutaneous three times a day before meals  insulin lispro (HumaLOG) corrective regimen sliding scale   SubCutaneous at bedtime  iron sucrose IVPB 100 milliGRAM(s) IV Intermittent once  isosorbide   mononitrate ER Tablet (IMDUR) 120 milliGRAM(s) Oral daily  metoprolol tartrate 25 milliGRAM(s) Oral two times a day  pantoprazole    Tablet 40 milliGRAM(s) Oral before breakfast  ranolazine 1000 milliGRAM(s) Oral two times a day  simvastatin 40 milliGRAM(s) Oral at bedtime    MEDICATIONS  (PRN):  aluminum hydroxide/magnesium hydroxide/simethicone Suspension 30 milliLiter(s) Oral every 4 hours PRN Dyspepsia  dextrose 40% Gel 15 Gram(s) Oral once PRN Blood Glucose LESS THAN 70 milliGRAM(s)/deciliter  glucagon  Injectable 1 milliGRAM(s) IntraMuscular once PRN Glucose LESS THAN 70 milligrams/deciliter    Allergies    No Known Allergies    Intolerances        VITAL SIGNS:  ICU Vital Signs Last 24 Hrs  T(C): 36.4 (16 Jun 2020 05:58), Max: 36.6 (15 Christophe 2020 14:44)  T(F): 97.6 (16 Jun 2020 05:58), Max: 97.9 (15 Christophe 2020 14:44)  HR: 69 (16 Jun 2020 05:58) (69 - 74)  BP: 133/94 (16 Jun 2020 05:58) (103/50 - 133/94)  BP(mean): 63 (15 Christophe 2020 17:44) (63 - 63)  ABP: --  ABP(mean): --  RR: 17 (16 Jun 2020 05:58) (16 - 18)  SpO2: 100% (16 Jun 2020 05:58) (97% - 100%)      15 Christophe 2020 07:01  -  16 Jun 2020 07:00  --------------------------------------------------------  IN:    Oral Fluid: 150 mL  Total IN: 150 mL    OUT:    Voided: 825 mL  Total OUT: 825 mL    Total NET: -675 mL        Physical Exam:  GENERAL: No acute distress, elderly frail, on RA  HEAD:  Atraumatic, Normocephalic  ENT: EOMI, Neck supple, + JVD, moist mucosa  CHEST/LUNG: Decreased breath sounds in lower lung fields (improving)   BACK: No spinal tenderness  HEART: Regular rate and rhythm; No murmurs, rubs, or gallops  ABDOMEN: Soft, Nontender, Nondistended; Bowel sounds present  EXTREMITIES:  No clubbing, cyanosis, 2+ pitting edema on hips  PSYCH: Nl behavior, nl affect  NEUROLOGY: AAOx3, non-focal,  SKIN: Normal color, No rashes or lesions    LABS:                        7.8    6.08  )-----------( 203      ( 16 Jun 2020 07:00 )             25.1                         7.6    6.15  )-----------( 210      ( 15 Christophe 2020 06:15 )             24.6                         8.2    6.24  )-----------( 223      ( 14 Jun 2020 06:10 )             27.0           06-16    136  |  96<L>  |  55<H>  ----------------------------<  189<H>  4.9   |  30  |  2.26<H>  06-15    138  |  96<L>  |  55<H>  ----------------------------<  176<H>  4.2   |  29  |  2.61<H>  06-14    139  |  96<L>  |  48<H>  ----------------------------<  212<H>  4.0   |  29  |  2.21<H>    Ca    9.0      16 Jun 2020 07:00  Ca    8.9      15 Christophe 2020 06:15  Ca    9.1      14 Jun 2020 06:10  Mg     2.1     06-16          CAPILLARY BLOOD GLUCOSE      POCT Blood Glucose.: 199 mg/dL (16 Jun 2020 07:44)  POCT Blood Glucose.: 210 mg/dL (15 Christophe 2020 21:39)  POCT Blood Glucose.: 145 mg/dL (15 Christophe 2020 16:53)  POCT Blood Glucose.: 258 mg/dL (15 Christophe 2020 12:41)            IMAGING: Radiology personally reviewed    Echo:  < from: Transthoracic Echocardiogram (02.19.20 @ 12:19) >  CONCLUSIONS:  1. Tethered mitral valve leaflets with normal opening.  Moderate-severe mitral regurgitation.  2. Calcified trileaflet aortic valve with normal opening.  Mild aortic regurgitation.  3. Severe global left ventricular systolic dysfunction.  4. Right ventricular enlargement with decreased right  ventricular systolic function.  5. Normal tricuspid valve. Moderate tricuspid  regurgitation.  6. Estimated pulmonary artery systolic pressure equals 50  mm Hg, assuming right atrial pressure equals 10  mm Hg,  consistent with moderate pulmonary hypertension.  ------------------------------------------------------------------------  Confirmed on  2/19/2020 - 13:12:47 by Jada Burns MD    < end of copied text >    Cath:  < from: Cardiac Cath Lab - Adult (11.17.16 @ 13:49) >  CORONARY VESSELS: The coronary circulation is right dominant.  LM:   --  LM: Normal.  LAD:   --  Mid LAD: There was a 60 % stenosis. In a second lesion, there  was a 99 % stenosis. The lesion was eccentric and moderately calcified.  --  Distal LAD: There was a diffuse 50 % stenosis. The lesion was  calcified.  --  D1: There was a 75 % stenosis in the middle third of the vessel  segment. There was a small vascular territory distal to the lesion.  CX:   --  Proximal circumflex: There was a 75 % stenosis. The lesion was  complex.  RCA:   --  Proximal RCA: There was a 100 % stenosis. There was poor  collateral blood supply to the distal myocardium.  COMPLICATIONS: No complications occurred during the cath lab visit.  DIAGNOSTIC IMPRESSIONS: Patient has severe diffsue coronary arterey disea  involving all three coronaries, not ideal for intervention or CABG.  DIAGNOSTIC RECOMMENDATIONS: Aggressive medical therapy and risk factor  modification.  Prepared and signed by  Lior Rivera M.D.    Interpretation of Telemetry: sinus 70-80s with PVCs

## 2020-06-17 NOTE — PROGRESS NOTE ADULT - SUBJECTIVE AND OBJECTIVE BOX
[facility] [phone]        Patient seen and examined in bed.   He was not SOB         MEDICATIONS  (STANDING):  aspirin enteric coated 81 milliGRAM(s) Oral daily  cholecalciferol 1000 Unit(s) Oral daily  clopidogrel Tablet 75 milliGRAM(s) Oral daily  dextrose 5%. 1000 milliLiter(s) (50 mL/Hr) IV Continuous <Continuous>  dextrose 50% Injectable 12.5 Gram(s) IV Push once  dextrose 50% Injectable 25 Gram(s) IV Push once  dextrose 50% Injectable 25 Gram(s) IV Push once  escitalopram 5 milliGRAM(s) Oral daily  folic acid 1 milliGRAM(s) Oral daily  furosemide    Tablet 40 milliGRAM(s) Oral daily  heparin   Injectable 5000 Unit(s) SubCutaneous three times a day  insulin lispro (HumaLOG) corrective regimen sliding scale   SubCutaneous three times a day before meals  insulin lispro (HumaLOG) corrective regimen sliding scale   SubCutaneous at bedtime  isosorbide   mononitrate ER Tablet (IMDUR) 120 milliGRAM(s) Oral daily  melatonin 3 milliGRAM(s) Oral at bedtime  metoprolol tartrate 25 milliGRAM(s) Oral two times a day  pantoprazole    Tablet 40 milliGRAM(s) Oral before breakfast  ranolazine 1000 milliGRAM(s) Oral two times a day  simvastatin 40 milliGRAM(s) Oral at bedtime      VITAL:  T(C): , Max: 36.6 (06-16-20 @ 10:45)  T(F): , Max: 97.8 (06-16-20 @ 10:45)  HR: 75 (06-17-20 @ 05:00)  BP: 118/71 (06-17-20 @ 05:00)  BP(mean): --  RR: 18 (06-17-20 @ 05:00)  SpO2: 98% (06-17-20 @ 05:00)  Wt(kg): --    I and O's:    06-16 @ 07:01  -  06-17 @ 07:00  --------------------------------------------------------  IN: 0 mL / OUT: 700 mL / NET: -700 mL          PHYSICAL EXAM:    Constitutional: NAD  Neck:  No JVD  Respiratory: CTAB/L  Cardiovascular: S1 and S2  Gastrointestinal: BS+, soft, NT/ND  Extremities: No peripheral edema  Neurological: A/O x 3, no focal deficits  Psychiatric: Normal mood, normal affect  : No Vickers  Skin: No rashes  Access: Not applicable    LABS:                        7.4    6.00  )-----------( 199      ( 17 Jun 2020 04:42 )             24.4     06-17    140  |  98  |  57<H>  ----------------------------<  163<H>  4.7   |  29  |  2.36<H>    Ca    9.0      17 Jun 2020 04:42  Mg     2.1     06-17            Urine Studies:          RADIOLOGY & ADDITIONAL STUDIES:

## 2020-06-17 NOTE — PROGRESS NOTE ADULT - SUBJECTIVE AND OBJECTIVE BOX
Patient is a 89y old  Male who presents with a chief complaint of Chest pain (17 Jun 2020 09:35)    Chest pain is better.  Eating without difficulty.  Small BM today.  he is weak.  Denies SOB, cough, wheeze, hemoptysis.  Denies HA and dizziness.  Denies fevers and chills and sweats.  Denies dysuria and hematuria.  Denies leg pain and swelling      Medication:   aluminum hydroxide/magnesium hydroxide/simethicone Suspension 30 milliLiter(s) Oral every 4 hours PRN  aspirin enteric coated 81 milliGRAM(s) Oral daily  cholecalciferol 1000 Unit(s) Oral daily  clopidogrel Tablet 75 milliGRAM(s) Oral daily  dextrose 40% Gel 15 Gram(s) Oral once PRN  dextrose 5%. 1000 milliLiter(s) IV Continuous <Continuous>  dextrose 50% Injectable 12.5 Gram(s) IV Push once  dextrose 50% Injectable 25 Gram(s) IV Push once  dextrose 50% Injectable 25 Gram(s) IV Push once  escitalopram 5 milliGRAM(s) Oral daily  folic acid 1 milliGRAM(s) Oral daily  furosemide    Tablet 40 milliGRAM(s) Oral daily  glucagon  Injectable 1 milliGRAM(s) IntraMuscular once PRN  heparin   Injectable 5000 Unit(s) SubCutaneous three times a day  insulin lispro (HumaLOG) corrective regimen sliding scale   SubCutaneous three times a day before meals  insulin lispro (HumaLOG) corrective regimen sliding scale   SubCutaneous at bedtime  isosorbide   mononitrate ER Tablet (IMDUR) 120 milliGRAM(s) Oral daily  melatonin 3 milliGRAM(s) Oral at bedtime  metoprolol tartrate 25 milliGRAM(s) Oral two times a day  pantoprazole    Tablet 40 milliGRAM(s) Oral before breakfast  ranolazine 1000 milliGRAM(s) Oral two times a day  simvastatin 40 milliGRAM(s) Oral at bedtime      Physical exam    T(C): 36.6 (06-17-20 @ 09:13), Max: 36.6 (06-16-20 @ 20:27)  HR: 67 (06-17-20 @ 09:13) (67 - 80)  BP: 109/57 (06-17-20 @ 09:13) (109/57 - 131/67)  RR: 21 (06-17-20 @ 09:13) (16 - 21)  SpO2: 97% (06-17-20 @ 09:13) (97% - 100%)  Wt(kg): --    alert NAD  scattered ecchymoses  EOMI anicteric sclera  Neck No LNA  Cv murmur RRR  Lungs clear B/L anteriorly  abd soft NT ND +BS  No LE edema or tenderness    Labs                              7.4    6.00  )-----------( 199      ( 17 Jun 2020 04:42 )             24.4       06-17    140  |  98  |  57<H>  ----------------------------<  163<H>  4.7   |  29  |  2.36<H>    Ca    9.0      17 Jun 2020 04:42  Mg     2.1     06-17            [id number]

## 2020-06-17 NOTE — PROGRESS NOTE ADULT - SUBJECTIVE AND OBJECTIVE BOX
Patient is a 89y old  Male who presents with a chief complaint of Chest pain (16 Jun 2020 13:42)      INTERVAL HPI/OVERNIGHT EVENTS: none.  Feels well    MEDICATIONS  (STANDING):  aspirin enteric coated 81 milliGRAM(s) Oral daily  cholecalciferol 1000 Unit(s) Oral daily  clopidogrel Tablet 75 milliGRAM(s) Oral daily  dextrose 5%. 1000 milliLiter(s) (50 mL/Hr) IV Continuous <Continuous>  dextrose 50% Injectable 12.5 Gram(s) IV Push once  dextrose 50% Injectable 25 Gram(s) IV Push once  dextrose 50% Injectable 25 Gram(s) IV Push once  escitalopram 5 milliGRAM(s) Oral daily  folic acid 1 milliGRAM(s) Oral daily  furosemide    Tablet 40 milliGRAM(s) Oral daily  heparin   Injectable 5000 Unit(s) SubCutaneous three times a day  insulin lispro (HumaLOG) corrective regimen sliding scale   SubCutaneous three times a day before meals  insulin lispro (HumaLOG) corrective regimen sliding scale   SubCutaneous at bedtime  isosorbide   mononitrate ER Tablet (IMDUR) 120 milliGRAM(s) Oral daily  melatonin 3 milliGRAM(s) Oral at bedtime  metoprolol tartrate 25 milliGRAM(s) Oral two times a day  pantoprazole    Tablet 40 milliGRAM(s) Oral before breakfast  ranolazine 1000 milliGRAM(s) Oral two times a day  simvastatin 40 milliGRAM(s) Oral at bedtime    MEDICATIONS  (PRN):  aluminum hydroxide/magnesium hydroxide/simethicone Suspension 30 milliLiter(s) Oral every 4 hours PRN Dyspepsia  dextrose 40% Gel 15 Gram(s) Oral once PRN Blood Glucose LESS THAN 70 milliGRAM(s)/deciliter  glucagon  Injectable 1 milliGRAM(s) IntraMuscular once PRN Glucose LESS THAN 70 milligrams/deciliter          Allergies    No Known Allergies    Intolerances        REVIEW OF SYSTEMS:  CARDIOVASCULAR: No chest pain, palpitations, dizziness, or leg swelling; no shortness of breath     RESPIRATORY: No cough, wheezing, chills or hemoptysis; No shortness of breath    GASTROINTESTINAL: No abdominal or epigastric pain. No nausea, vomiting, or hematemesis; No diarrhea or constipation. No melena or hematochezia.    NEUROLOGICAL: No headaches, memory loss, loss of strength, numbness      PHYSICAL EXAM:  Vital Signs Last 24 Hrs  T(C): 36.1 (17 Jun 2020 05:00), Max: 36.6 (16 Jun 2020 10:45)  T(F): 97 (17 Jun 2020 05:00), Max: 97.8 (16 Jun 2020 10:45)  HR: 75 (17 Jun 2020 05:00) (67 - 80)  BP: 118/71 (17 Jun 2020 05:00) (111/63 - 131/67)  BP(mean): --  RR: 18 (17 Jun 2020 05:00) (16 - 18)  SpO2: 98% (17 Jun 2020 05:00) (97% - 100%)    GENERAL: NAD, well-groomed, well-developed  HEAD:  Atraumatic, Normocephalic  EYES: EOMI, PERRLA, conjunctiva and sclera clear  NECK: Supple, No JVD, Normal thyroid  NERVOUS SYSTEM:  Alert & Oriented X3, Good concentration;  and symmetric  CHEST/LUNG: Clear to auscultation bilaterally; No rales, rhonchi, wheezing, or rubs  HEART: S4S1S2 regular, with I/VI systolic murmur at apex, without  rub   ABDOMEN: Soft, Nontender, Nondistended; Bowel sounds present  EXTREMITIES:  2+ Peripheral Pulses, No clubbing, cyanosis, or edema      LABS:                        7.4    6.00  )-----------( 199      ( 17 Jun 2020 04:42 )             24.4     17 Jun 2020 04:42    140    |  98     |  57     ----------------------------<  163    4.7     |  29     |  2.36     Ca    9.0        17 Jun 2020 04:42  Mg     2.1       17 Jun 2020 04:42        CAPILLARY BLOOD GLUCOSE      POCT Blood Glucose.: 216 mg/dL (17 Jun 2020 07:58)  POCT Blood Glucose.: 227 mg/dL (16 Jun 2020 21:55)  POCT Blood Glucose.: 191 mg/dL (16 Jun 2020 17:02)  POCT Blood Glucose.: 193 mg/dL (16 Jun 2020 11:33)            assessment:   to my exam, no evidence of heart failure on exam.  .  Given severe LV dysfunction and MR, heart failure can be expected, though his oxygen saturations are excellent      D/W Heme:  He was hemeoccult positive in February.  Awaiting repeat  stool for occult blood.  Started on IV iron.  No response thus far.  If H/H not improved, will transfuse tomorrow.      Care Discussed with Consultants/Other Providers: Dr Taylor, Nephrology

## 2020-06-17 NOTE — PROGRESS NOTE ADULT - SUBJECTIVE AND OBJECTIVE BOX
Patient seen and examined at bedside.    Overnight Events:  no issues overnight  Tele: intermittently V-paced    ROS: no chest pain/shortness of breath    MEDICATIONS  (STANDING):  aspirin enteric coated 81 milliGRAM(s) Oral daily  cholecalciferol 1000 Unit(s) Oral daily  clopidogrel Tablet 75 milliGRAM(s) Oral daily  dextrose 5%. 1000 milliLiter(s) (50 mL/Hr) IV Continuous <Continuous>  dextrose 50% Injectable 12.5 Gram(s) IV Push once  dextrose 50% Injectable 25 Gram(s) IV Push once  dextrose 50% Injectable 25 Gram(s) IV Push once  escitalopram 5 milliGRAM(s) Oral daily  folic acid 1 milliGRAM(s) Oral daily  furosemide    Tablet 40 milliGRAM(s) Oral daily  heparin   Injectable 5000 Unit(s) SubCutaneous three times a day  insulin lispro (HumaLOG) corrective regimen sliding scale   SubCutaneous three times a day before meals  insulin lispro (HumaLOG) corrective regimen sliding scale   SubCutaneous at bedtime  isosorbide   mononitrate ER Tablet (IMDUR) 120 milliGRAM(s) Oral daily  melatonin 3 milliGRAM(s) Oral at bedtime  metoprolol tartrate 25 milliGRAM(s) Oral two times a day  pantoprazole    Tablet 40 milliGRAM(s) Oral before breakfast  ranolazine 1000 milliGRAM(s) Oral two times a day  simvastatin 40 milliGRAM(s) Oral at bedtime    MEDICATIONS  (PRN):  aluminum hydroxide/magnesium hydroxide/simethicone Suspension 30 milliLiter(s) Oral every 4 hours PRN Dyspepsia  dextrose 40% Gel 15 Gram(s) Oral once PRN Blood Glucose LESS THAN 70 milliGRAM(s)/deciliter  glucagon  Injectable 1 milliGRAM(s) IntraMuscular once PRN Glucose LESS THAN 70 milligrams/deciliter    Allergies    No Known Allergies    Intolerances        VITAL SIGNS:  ICU Vital Signs Last 24 Hrs  T(C): 36.6 (17 Jun 2020 09:13), Max: 36.6 (16 Jun 2020 10:45)  T(F): 97.9 (17 Jun 2020 09:13), Max: 97.9 (17 Jun 2020 09:13)  HR: 67 (17 Jun 2020 09:13) (67 - 80)  BP: 109/57 (17 Jun 2020 09:13) (109/57 - 131/67)  BP(mean): --  ABP: --  ABP(mean): --  RR: 21 (17 Jun 2020 09:13) (16 - 21)  SpO2: 97% (17 Jun 2020 09:13) (97% - 100%)      16 Jun 2020 07:01  -  17 Jun 2020 07:00  --------------------------------------------------------  IN:  Total IN: 0 mL    OUT:    Voided: 700 mL  Total OUT: 700 mL    Total NET: -700 mL      Physical Exam:  GENERAL: No acute distress, elderly frail, on RA  HEAD:  Atraumatic, Normocephalic  ENT: EOMI, Neck supple, + JVD, moist mucosa  CHEST/LUNG: Decreased breath sounds in lower lung fields (improving)   BACK: No spinal tenderness  HEART: Regular rate and rhythm; No murmurs, rubs, or gallops  ABDOMEN: Soft, Nontender, Nondistended; Bowel sounds present  EXTREMITIES:  No clubbing, cyanosis, 2+ pitting edema on hips  PSYCH: Nl behavior, nl affect  NEUROLOGY: AAOx3, non-focal,  SKIN: Normal color, No rashes or lesions    LABS:                        7.4    6.00  )-----------( 199      ( 17 Jun 2020 04:42 )             24.4                         7.8    6.08  )-----------( 203      ( 16 Jun 2020 07:00 )             25.1                         7.6    6.15  )-----------( 210      ( 15 Christophe 2020 06:15 )             24.6           06-17    140  |  98  |  57<H>  ----------------------------<  163<H>  4.7   |  29  |  2.36<H>  06-16    136  |  96<L>  |  55<H>  ----------------------------<  189<H>  4.9   |  30  |  2.26<H>  06-15    138  |  96<L>  |  55<H>  ----------------------------<  176<H>  4.2   |  29  |  2.61<H>    Ca    9.0      17 Jun 2020 04:42  Ca    9.0      16 Jun 2020 07:00  Ca    8.9      15 Christophe 2020 06:15  Mg     2.1     06-17          CAPILLARY BLOOD GLUCOSE      POCT Blood Glucose.: 216 mg/dL (17 Jun 2020 07:58)  POCT Blood Glucose.: 227 mg/dL (16 Jun 2020 21:55)  POCT Blood Glucose.: 191 mg/dL (16 Jun 2020 17:02)  POCT Blood Glucose.: 193 mg/dL (16 Jun 2020 11:33)            IMAGING: Radiology personally reviewed    Echo:  < from: Transthoracic Echocardiogram (02.19.20 @ 12:19) >  CONCLUSIONS:  1. Tethered mitral valve leaflets with normal opening.  Moderate-severe mitral regurgitation.  2. Calcified trileaflet aortic valve with normal opening.  Mild aortic regurgitation.  3. Severe global left ventricular systolic dysfunction.  4. Right ventricular enlargement with decreased right  ventricular systolic function.  5. Normal tricuspid valve. Moderate tricuspid  regurgitation.  6. Estimated pulmonary artery systolic pressure equals 50  mm Hg, assuming right atrial pressure equals 10  mm Hg,  consistent with moderate pulmonary hypertension.  ------------------------------------------------------------------------  Confirmed on  2/19/2020 - 13:12:47 by Jada Burns MD    < end of copied text >    Cath:  < from: Cardiac Cath Lab - Adult (11.17.16 @ 13:49) >  CORONARY VESSELS: The coronary circulation is right dominant.  LM:   --  LM: Normal.  LAD:   --  Mid LAD: There was a 60 % stenosis. In a second lesion, there  was a 99 % stenosis. The lesion was eccentric and moderately calcified.  --  Distal LAD: There was a diffuse 50 % stenosis. The lesion was  calcified.  --  D1: There was a 75 % stenosis in the middle third of the vessel  segment. There was a small vascular territory distal to the lesion.  CX:   --  Proximal circumflex: There was a 75 % stenosis. The lesion was  complex.  RCA:   --  Proximal RCA: There was a 100 % stenosis. There was poor  collateral blood supply to the distal myocardium.  COMPLICATIONS: No complications occurred during the cath lab visit.  DIAGNOSTIC IMPRESSIONS: Patient has severe diffuse coronary artery disease  involving all three coronaries, not ideal for intervention or CABG.  DIAGNOSTIC RECOMMENDATIONS: Aggressive medical therapy and risk factor  modification.  Prepared and signed by  Lior Rivera M.D.    Interpretation of Telemetry: sinus 70-80s with PVCs

## 2020-06-17 NOTE — PROGRESS NOTE ADULT - ASSESSMENT
ASSESSMENT/RECOMMENDATION:  89 y.o M pmh HTN. DM2 not on insulin, CKD3, CAD c TVD not amenable to surgery or PCI, Chronic systolic CHF (severe LV Dysfunction), S/P PPM ([employer]) and discharged 6/9 after admission for NSTEMI presents with chest pain  CKD stage 4 at present.  Given the age the exact etiology is not important  Anemia needs to be fixed in order to help with sx of chest pain   Secondary hyperparathyroidism     1 Renal - Azotemia improving. Lasix  40mg po qd.  At present he is not in heart failure  and I did not appreciate sustained JVD or LE edema .    No need for Rocaltrol at present      2 Anemia- slightly higher; s/p retacrit  ; SP Venofer 100mg IV x 1 ;  If HGB remains low then blood xfusion in am     3 CVS-Not in heart failure at present;  No active chest pain at present      [facility]  [facility] medical group   [phone] ASSESSMENT/RECOMMENDATION:  89 y.o M pmh HTN. DM2 not on insulin, CKD3, CAD c TVD not amenable to surgery or PCI, Chronic systolic CHF (severe LV Dysfunction), S/P PPM ([employer]) and discharged 6/9 after admission for NSTEMI presents with chest pain  CKD stage 4 at present.  Given the age the exact etiology is not important  Anemia needs to be fixed in order to help with sx of chest pain   Secondary hyperparathyroidism     1 Renal - Azotemia improving. Lasix  40mg po qd.  At present he is not in heart failure  and I did not appreciate sustained JVD or LE edema .    No need for Rocaltrol at present      2 Anemia- slightly higher; s/p retacrit  ; SP Venofer 100mg IV x 1 ;  If HGB remains low then blood xfusion in am     3 CVS-Not in heart failure at present;  No active chest pain at present       [facility] medical group   [phone]

## 2020-06-17 NOTE — PROGRESS NOTE ADULT - ASSESSMENT
Anemia appears to be a mixed AOCD process due to kidney disease and iron deficiency.  He has gotten a dose of Venofer and Retacrit.  Hgb lower and would give PRBc x 1.  Monitor the Hgb and manage supportively.  No B12 or folate deficiency.  No hemolysis with normal LDH and haptoglobin.  GI evaluation with a colonoscopy EGD etc could be considered if he would be a candidate but not sure based upon his age.

## 2020-06-17 NOTE — PROGRESS NOTE ADULT - ASSESSMENT
Assessment and Plan:    89 year old M pt with PMH of HTN, HLD, DM, known CAD with multi-vessel disease on [facility] in 2016 without lesions amenable for PCI/CABG per report presented from home with chest pressure and dyspnea.  He had recent NSTEMI, still on medical therapy.  I think his symptoms are mostly due to a CHF exacerbation.     1. Acute on chronic heart failure, likely cardiorenal LEONCIO  - on exam 6/17, patient continues to appear volume overloaded (+JVP, pitting edema in thigh/sacrum) would give 80 mg IV lasix qD to optimize fluid status  - c/w compression stockings or ace wraps to improve lower extremity edema  - no need to trend pro-BNPs  - strict ins and outs, daily wts  - fluids and sodium restriction  - monitor on Tele  - replete electrolytes: goal K>4 and Mg>2    2. CAD, recent NSTEMI  - continue medical management ASA/Plavix/metop 25 bid/imdur/ranexa  - not amenable to other more invasive interventions  - no need to trend enzymes further    3. HTN   - well controlled, c/w current antiHTN medications    4. Normocytic anemia: likely 2/2 CKD, Hbg in mid-low 7s  - s/p retacrit (6/15), venofer (6/16)  - management by primary team  - Would recommend (given CAD) to keep Hbg >8    Cardiology signing off, please call back and re-consult with any new questions.    Heather Zhao MD MHS  PGY-1 Internal Medicine  [facility] Pager: [phone] | NS: [phone] Assessment and Plan:    89 year old M pt with PMH of HTN, HLD, DM, known CAD with multi-vessel disease on [facility] in 2016 without lesions amenable for PCI/CABG per report presented from home with chest pressure and dyspnea.  He had recent NSTEMI, still on medical therapy.  I think his symptoms are mostly due to a CHF exacerbation.     1. Acute on chronic heart failure, likely cardiorenal LEONCIO  - on exam 6/17, patient continues to appear volume overloaded (+JVP, pitting edema in thigh/sacrum) would give 80 mg IV lasix qD to optimize fluid status  - c/w compression stockings or ace wraps to improve lower extremity edema  - no need to trend pro-BNPs  - strict ins and outs, daily wts  - fluids and sodium restriction  - monitor on Tele  - replete electrolytes: goal K>4 and Mg>2    2. CAD, recent NSTEMI  - continue medical management ASA/Plavix/metop 25 bid/imdur/ranexa  - not amenable to other more invasive interventions  - no need to trend enzymes further    3. HTN   - well controlled, c/w current antiHTN medications    4. Normocytic anemia: likely 2/2 CKD, Hbg in mid-low 7s  - s/p retacrit (6/15), venofer (6/16)  - management by primary team    Cardiology signing off, please call back and re-consult with any new questions.    Heather Zhao MD MHS  PGY-1 Internal Medicine  [facility] Pager: [phone] | NS: [phone]

## 2020-06-18 NOTE — PROGRESS NOTE ADULT - ASSESSMENT
Anemia due to CKD but also iron deficient and has had heme occult positive stool in the past.  he has been given Venofer, Retacrit and was transfused yesterday.  Hgb today is 9 and adequate.  Monitor and manage supportively.

## 2020-06-18 NOTE — CHART NOTE - NSCHARTNOTEFT_GEN_A_CORE
Patient last seen by RD on 6/11, now for [facility] nutrition follow up. Unable to conduct a face to face interview or nutrition-focused physical exam due to limited contact restrictions related to patient's medical condition and isolation precautions. Obtained subjective information from extensive chart review.     Nutrition Interval Events:     Diet Order:     Current Weight:    Pertinent Medications:     Pertinent Labs:    Skin:     Estimated Needs:   [] no change since previous assessment  [] recalculated    Nutrition Diagnosis:   [] new [] ongoing [] resolved     Goal(s):  1.     Recommendations:   1.     Monitoring and Evaluation:   1. Monitor weights, labs, BMs, skin integrity, and PO intake  2. RD services to remain available     Breanna Garcia RDN   Pager #[phone] Patient last seen by RD on 6/11, now for [facility] nutrition follow up. Obtained subjective information from RN and extensive chart review.     Nutrition Interval Events: Transfused yesterday in the setting of anemia. Patient with stable weight since last assessed by dietitian. RN reports patient eating okay but with complaints of swallowing difficulty. However, patient was eating breakfast with no problem. Patient sleeping, noticed Glucerna unopened at bedside. If medically appropriate, consider consulting SLP for bedside swallow evaluation.     Diet Order: Diet, Regular:   Consistent Carbohydrate {Evening Snack} (CSTCHOSN)  DASH/TLC {Sodium & Cholesterol Restricted} (DASH)  1000mL Fluid Restriction ([id number])  Supplement Feeding Modality:  Oral  Glucerna Shake Cans or Servings Per Day:  1       Frequency:  Three Times a day (06-12-20 @ 12:48)    Current Weight: 59.9 kg (6/16)  Previous Weight(s): 59 (6/14), 59.1 kg (6/12), 60.1 kg (6/11), 62.8 kg (2/19)  Weight remains stable since last assessed by RD     MEDICATIONS  (STANDING):  aspirin enteric coated 81 milliGRAM(s) Oral daily  cholecalciferol 1000 Unit(s) Oral daily  clopidogrel Tablet 75 milliGRAM(s) Oral daily  dextrose 5%. 1000 milliLiter(s) (50 mL/Hr) IV Continuous <Continuous>  dextrose 50% Injectable 12.5 Gram(s) IV Push once  dextrose 50% Injectable 25 Gram(s) IV Push once  dextrose 50% Injectable 25 Gram(s) IV Push once  escitalopram 5 milliGRAM(s) Oral daily  folic acid 1 milliGRAM(s) Oral daily  furosemide    Tablet 40 milliGRAM(s) Oral daily  heparin   Injectable 5000 Unit(s) SubCutaneous three times a day  insulin lispro (HumaLOG) corrective regimen sliding scale   SubCutaneous three times a day before meals  insulin lispro (HumaLOG) corrective regimen sliding scale   SubCutaneous at bedtime  isosorbide   mononitrate ER Tablet (IMDUR) 120 milliGRAM(s) Oral daily  melatonin 3 milliGRAM(s) Oral at bedtime  metoprolol tartrate 25 milliGRAM(s) Oral two times a day  pantoprazole    Tablet 40 milliGRAM(s) Oral before breakfast  ranolazine 1000 milliGRAM(s) Oral two times a day  simvastatin 40 milliGRAM(s) Oral at bedtime    MEDICATIONS  (PRN):  aluminum hydroxide/magnesium hydroxide/simethicone Suspension 30 milliLiter(s) Oral every 4 hours PRN Dyspepsia  dextrose 40% Gel 15 Gram(s) Oral once PRN Blood Glucose LESS THAN 70 milliGRAM(s)/deciliter  glucagon  Injectable 1 milliGRAM(s) IntraMuscular once PRN Glucose LESS THAN 70 milligrams/deciliter    Pertinent Labs: 06-17 Na 140 mmol/L Glu 163 mg/dL<H> K+ 4.7 mmol/L Cr 2.36 mg/dL<H> BUN 57 mg/dL<H> Phos n/a    06-18 @ 11:38 POCT 199 mg/dL  06-18 @ 08:00 POCT 181 mg/dL  06-17 @ 22:42 POCT 181 mg/dL  06-17 @ 17:13 POCT 189 mg/dL    Skin: Intact, no edema    Estimated Needs:   [x] no change since previous assessment  [] recalculated    Nutrition Diagnosis: Unintended weight loss  [] new [x] ongoing [] resolved    Recommendations:   1. Continue with Glucerna Therapeutic Nutrition Shake 240mls 3x daily (660kcals, 30g protein)   2. Obtain new weight to assess trend  3. If medically appropriate, consider consulting SLP to assess swallow mechanism     Monitoring and Evaluation:   1. Monitor weights, labs, BMs, skin integrity, and PO intake  2. RD services to remain available     Breanna Garcia RDN   Pager #[phone]

## 2020-06-18 NOTE — CHART NOTE - NSCHARTNOTEFT_GEN_A_CORE
Notified by RN for increased confusion and patient attempting to get out of bed. RN unaware of patient's baseline. Upon examination of patient bedside patient is alert and oriented x 4 (person, place, president and ). Patient admits to feeling confused within the last hour. Denies complaints at this time. , vital signs stable.     On Exam:   General: Patient resting in bed, NAD, A&O x 4  Neuro exam: No focal deficits, strength and sensation intact equally and bilaterally, patient able to follow commands, no facial asymmetry.     Plan:   -If confusion worsens will consider neurology consult  -Will continue to monitor

## 2020-06-18 NOTE — PROGRESS NOTE ADULT - ASSESSMENT
ASSESSMENT/RECOMMENDATION:  89 y.o M pmh HTN. DM2 not on insulin, CKD3, CAD c TVD not amenable to surgery or PCI, Chronic systolic CHF (severe LV Dysfunction), S/P PPM ([employer]) and discharged 6/9 after admission for NSTEMI presents with chest pain  CKD stage 4 at present.  Given the age the exact etiology is not important  Anemia needs to be fixed in order to help with sx of chest pain   Secondary hyperparathyroidism     1 Renal - Azotemia improving. Lasix  40mg po qd.  At present he is not in heart failure  and I did not appreciate sustained JVD or LE edema .    No need for Rocaltrol at present      2 Anemia- SP blood yesterday and awaiting repeat CBC     3 CVS-Not in heart failure at present;  No active chest pain at present       He was confused last night but seems lucid this am     [facility] medical group   [phone]

## 2020-06-18 NOTE — PROGRESS NOTE ADULT - SUBJECTIVE AND OBJECTIVE BOX
Patient is a 89y old  Male who presents with a chief complaint of Chest pain (18 Jun 2020 07:50)      INTERVAL HPI/OVERNIGHT EVENTS: transfused yesterday; still feels weak     MEDICATIONS  (STANDING):  aspirin enteric coated 81 milliGRAM(s) Oral daily  cholecalciferol 1000 Unit(s) Oral daily  clopidogrel Tablet 75 milliGRAM(s) Oral daily  dextrose 5%. 1000 milliLiter(s) (50 mL/Hr) IV Continuous <Continuous>  dextrose 50% Injectable 12.5 Gram(s) IV Push once  dextrose 50% Injectable 25 Gram(s) IV Push once  dextrose 50% Injectable 25 Gram(s) IV Push once  escitalopram 5 milliGRAM(s) Oral daily  folic acid 1 milliGRAM(s) Oral daily  furosemide    Tablet 40 milliGRAM(s) Oral daily  heparin   Injectable 5000 Unit(s) SubCutaneous three times a day  insulin lispro (HumaLOG) corrective regimen sliding scale   SubCutaneous three times a day before meals  insulin lispro (HumaLOG) corrective regimen sliding scale   SubCutaneous at bedtime  isosorbide   mononitrate ER Tablet (IMDUR) 120 milliGRAM(s) Oral daily  melatonin 3 milliGRAM(s) Oral at bedtime  metoprolol tartrate 25 milliGRAM(s) Oral two times a day  pantoprazole    Tablet 40 milliGRAM(s) Oral before breakfast  ranolazine 1000 milliGRAM(s) Oral two times a day  simvastatin 40 milliGRAM(s) Oral at bedtime    MEDICATIONS  (PRN):  aluminum hydroxide/magnesium hydroxide/simethicone Suspension 30 milliLiter(s) Oral every 4 hours PRN Dyspepsia  dextrose 40% Gel 15 Gram(s) Oral once PRN Blood Glucose LESS THAN 70 milliGRAM(s)/deciliter  glucagon  Injectable 1 milliGRAM(s) IntraMuscular once PRN Glucose LESS THAN 70 milligrams/deciliter        Orders last 24 hours:  Occult Blood, Feces: Routine (06-17-20 @ 10:38)  Complete Blood Count: 04:00 (06-17-20 @ 10:57)  Comprehensive Metabolic Panel: 04:00 (06-17-20 @ 10:57)  POCT  Blood Glucose (06-17-20 @ 11:47)  Packed Red Cells Order:  1/2 Unit   Indication: (ADULT) Hgb <8 gm/dL -Stable Cardiovascular Disease or  Infuse Unit : 3 Hours (06-17-20 @ 14:04)  Packed Red Cells Order:  1/2 Unit   Indication: (ADULT) Hgb <8 gm/dL -Stable Cardiovascular Disease or  Infuse Unit : 3 Hours (06-17-20 @ 14:04)  furosemide   Injectable: [Ordered as LASIX Injectable]  20 milliGRAM(s), IV Push, once, Stop After 1 Doses  Special Instructions: give in between prbc  Administration Instructions: Max IV Push dose 80 milliGRAM(s)  IF IV PUSH, ADMINISTER 40 mG PER MINUTE (06-17-20 @ 14:04)  POCT  Blood Glucose (06-17-20 @ 17:15)  POCT  Blood Glucose (06-17-20 @ 22:43)  POCT  Blood Glucose (06-18-20 @ 08:01)      Allergies    No Known Allergies    Intolerances        REVIEW OF SYSTEMS:  CARDIOVASCULAR: No chest pain, palpitations, dizziness, or leg swelling; no shortness of breath     RESPIRATORY: No cough, wheezing, chills or hemoptysis; No shortness of breath    GASTROINTESTINAL: No abdominal or epigastric pain. No nausea, vomiting, or hematemesis; No diarrhea or constipation. No melena or hematochezia.    NEUROLOGICAL: No headaches, memory loss, loss of strength, numbness      PHYSICAL EXAM:  Vital Signs Last 24 Hrs  T(C): 36.4 (18 Jun 2020 07:18), Max: 36.5 (17 Jun 2020 17:07)  T(F): 97.5 (18 Jun 2020 07:18), Max: 97.7 (17 Jun 2020 17:07)  HR: 70 (18 Jun 2020 07:18) (65 - 78)  BP: 127/86 (18 Jun 2020 07:18) (108/72 - 139/72)  BP(mean): --  RR: 18 (18 Jun 2020 07:18) (18 - 22)  SpO2: 99% (18 Jun 2020 07:18) (98% - 100%)    GENERAL: NAD, well-groomed, well-developed  HEAD:  Atraumatic, Normocephalic  EYES: EOMI, PERRLA, conjunctiva and sclera clear  NECK: Supple, No JVD, Normal thyroid  NERVOUS SYSTEM:  Alert & Oriented X3, Good concentration;  and symmetric  CHEST/LUNG: Clear to auscultation bilaterally; No rales, rhonchi, wheezing, or rubs  HEART: S1S2 regular, without murmur, rub nor gallop  ABDOMEN: Soft, Nontender, Nondistended; Bowel sounds present  EXTREMITIES:  trace pretibial  edema    pt oob to chair     LABS:      Ca    9.0        17 Jun 2020 04:42        CAPILLARY BLOOD GLUCOSE      POCT Blood Glucose.: 181 mg/dL (18 Jun 2020 08:00)  POCT Blood Glucose.: 181 mg/dL (17 Jun 2020 22:42)  POCT Blood Glucose.: 189 mg/dL (17 Jun 2020 17:13)  POCT Blood Glucose.: 196 mg/dL (17 Jun 2020 11:42)               pt transfused yesterday.  Await repeat labs, occult blood.  Ambulate with assistance     Care Discussed with Consultants/Other Providers: PA

## 2020-06-18 NOTE — PROGRESS NOTE ADULT - SUBJECTIVE AND OBJECTIVE BOX
NEPHROLOGY-[facility] [phone]        Patient seen and examined in bed.  He was in good spirits and offered no complaints         MEDICATIONS  (STANDING):  aspirin enteric coated 81 milliGRAM(s) Oral daily  cholecalciferol 1000 Unit(s) Oral daily  clopidogrel Tablet 75 milliGRAM(s) Oral daily  dextrose 5%. 1000 milliLiter(s) (50 mL/Hr) IV Continuous <Continuous>  dextrose 50% Injectable 12.5 Gram(s) IV Push once  dextrose 50% Injectable 25 Gram(s) IV Push once  dextrose 50% Injectable 25 Gram(s) IV Push once  escitalopram 5 milliGRAM(s) Oral daily  folic acid 1 milliGRAM(s) Oral daily  furosemide    Tablet 40 milliGRAM(s) Oral daily  heparin   Injectable 5000 Unit(s) SubCutaneous three times a day  insulin lispro (HumaLOG) corrective regimen sliding scale   SubCutaneous three times a day before meals  insulin lispro (HumaLOG) corrective regimen sliding scale   SubCutaneous at bedtime  isosorbide   mononitrate ER Tablet (IMDUR) 120 milliGRAM(s) Oral daily  melatonin 3 milliGRAM(s) Oral at bedtime  metoprolol tartrate 25 milliGRAM(s) Oral two times a day  pantoprazole    Tablet 40 milliGRAM(s) Oral before breakfast  ranolazine 1000 milliGRAM(s) Oral two times a day  simvastatin 40 milliGRAM(s) Oral at bedtime      VITAL:  T(C): , Max: 36.5 (06-17-20 @ 17:07)  T(F): , Max: 97.7 (06-17-20 @ 17:07)  HR: 61 (06-18-20 @ 10:00)  BP: 108/69 (06-18-20 @ 10:00)  BP(mean): --  RR: 16 (06-18-20 @ 10:00)  SpO2: 97% (06-18-20 @ 10:00)  Wt(kg): --    I and O's:    06-17 @ 07:01  -  06-18 @ 07:00  --------------------------------------------------------  IN: 0 mL / OUT: 500 mL / NET: -500 mL    06-18 @ 07:01  -  06-18 @ 13:02  --------------------------------------------------------  IN: 0 mL / OUT: 450 mL / NET: -450 mL          PHYSICAL EXAM:    Constitutional: cachetic   Neck:  No JVD  Respiratory: CTAB/L  Cardiovascular: S1 and S2  Gastrointestinal: BS+, soft, NT/ND  Extremities: No peripheral edema  Neurological: A/O x 3, no focal deficits  Psychiatric: Normal mood, normal affect  : No Vickers  Skin: No rashes  Access: Not applicable    LABS:                        7.4    6.00  )-----------( 199      ( 17 Jun 2020 04:42 )             24.4     06-17    140  |  98  |  57<H>  ----------------------------<  163<H>  4.7   |  29  |  2.36<H>    Ca    9.0      17 Jun 2020 04:42  Mg     2.1     06-17            Urine Studies:          RADIOLOGY & ADDITIONAL STUDIES:

## 2020-06-18 NOTE — PROGRESS NOTE ADULT - SUBJECTIVE AND OBJECTIVE BOX
Patient is a 89y old  Male who presents with a chief complaint of Chest pain (17 Jun 2020 11:51)      Medication:   aluminum hydroxide/magnesium hydroxide/simethicone Suspension 30 milliLiter(s) Oral every 4 hours PRN  aspirin enteric coated 81 milliGRAM(s) Oral daily  cholecalciferol 1000 Unit(s) Oral daily  clopidogrel Tablet 75 milliGRAM(s) Oral daily  dextrose 40% Gel 15 Gram(s) Oral once PRN  dextrose 5%. 1000 milliLiter(s) IV Continuous <Continuous>  dextrose 50% Injectable 12.5 Gram(s) IV Push once  dextrose 50% Injectable 25 Gram(s) IV Push once  dextrose 50% Injectable 25 Gram(s) IV Push once  escitalopram 5 milliGRAM(s) Oral daily  folic acid 1 milliGRAM(s) Oral daily  furosemide    Tablet 40 milliGRAM(s) Oral daily  glucagon  Injectable 1 milliGRAM(s) IntraMuscular once PRN  heparin   Injectable 5000 Unit(s) SubCutaneous three times a day  insulin lispro (HumaLOG) corrective regimen sliding scale   SubCutaneous three times a day before meals  insulin lispro (HumaLOG) corrective regimen sliding scale   SubCutaneous at bedtime  isosorbide   mononitrate ER Tablet (IMDUR) 120 milliGRAM(s) Oral daily  melatonin 3 milliGRAM(s) Oral at bedtime  metoprolol tartrate 25 milliGRAM(s) Oral two times a day  pantoprazole    Tablet 40 milliGRAM(s) Oral before breakfast  ranolazine 1000 milliGRAM(s) Oral two times a day  simvastatin 40 milliGRAM(s) Oral at bedtime      Physical exam    T(C): 36.4 (06-18-20 @ 07:18), Max: 36.6 (06-17-20 @ 09:13)  HR: 70 (06-18-20 @ 07:18) (65 - 78)  BP: 127/86 (06-18-20 @ 07:18) (108/72 - 139/72)  RR: 18 (06-18-20 @ 07:18) (18 - 22)  SpO2: 99% (06-18-20 @ 07:18) (97% - 100%)  Wt(kg): --    alert NAD  EOMI anicteric sclera  Neck Supple No LNA  Cv s1 S2 RRR  Lungs clear B/L  abd soft NT ND +BS  No LE edema or tenderness    Labs                              7.4    6.00  )-----------( 199      ( 17 Jun 2020 04:42 )             24.4       06-17    140  |  98  |  57<H>  ----------------------------<  163<H>  4.7   |  29  |  2.36<H>    Ca    9.0      17 Jun 2020 04:42  Mg     2.1     06-17            [id number] Note based upon encounter from earlier this morning    Patient is a 89y old  Male who presents with a chief complaint of Chest pain (17 Jun 2020 11:51)    Sitting up in chair.  says that he is not feeling as well, but he is not elucidating.  He is sleepy      Medication:   aluminum hydroxide/magnesium hydroxide/simethicone Suspension 30 milliLiter(s) Oral every 4 hours PRN  aspirin enteric coated 81 milliGRAM(s) Oral daily  cholecalciferol 1000 Unit(s) Oral daily  clopidogrel Tablet 75 milliGRAM(s) Oral daily  dextrose 40% Gel 15 Gram(s) Oral once PRN  dextrose 5%. 1000 milliLiter(s) IV Continuous <Continuous>  dextrose 50% Injectable 12.5 Gram(s) IV Push once  dextrose 50% Injectable 25 Gram(s) IV Push once  dextrose 50% Injectable 25 Gram(s) IV Push once  escitalopram 5 milliGRAM(s) Oral daily  folic acid 1 milliGRAM(s) Oral daily  furosemide    Tablet 40 milliGRAM(s) Oral daily  glucagon  Injectable 1 milliGRAM(s) IntraMuscular once PRN  heparin   Injectable 5000 Unit(s) SubCutaneous three times a day  insulin lispro (HumaLOG) corrective regimen sliding scale   SubCutaneous three times a day before meals  insulin lispro (HumaLOG) corrective regimen sliding scale   SubCutaneous at bedtime  isosorbide   mononitrate ER Tablet (IMDUR) 120 milliGRAM(s) Oral daily  melatonin 3 milliGRAM(s) Oral at bedtime  metoprolol tartrate 25 milliGRAM(s) Oral two times a day  pantoprazole    Tablet 40 milliGRAM(s) Oral before breakfast  ranolazine 1000 milliGRAM(s) Oral two times a day  simvastatin 40 milliGRAM(s) Oral at bedtime      Physical exam    T(C): 36.4 (06-18-20 @ 07:18), Max: 36.6 (06-17-20 @ 09:13)  HR: 70 (06-18-20 @ 07:18) (65 - 78)  BP: 127/86 (06-18-20 @ 07:18) (108/72 - 139/72)  RR: 18 (06-18-20 @ 07:18) (18 - 22)  SpO2: 99% (06-18-20 @ 07:18) (97% - 100%)  Wt(kg): --    NAD  EOMI anicteric sclera  Cv RRR  Lungs clear B/L  abd soft NT ND +BS    Labs                        7.4    6.00  )-----------( 199      ( 17 Jun 2020 04:42 )             24.4       06-17    140  |  98  |  57<H>  ----------------------------<  163<H>  4.7   |  29  |  2.36<H>    Ca    9.0      17 Jun 2020 04:42  Mg     2.1     06-17            [id number]

## 2020-06-19 NOTE — DISCHARGE NOTE PROVIDER - HOSPITAL COURSE
89M h/o HTN, DM2, CKD3, CAD, systolic CHF (Severe LV Dysfunction w/ PPM [employer]) discharged 6/9 after admission for NSTEMI p/w CP a/w AOC CHF s/p IV Lasix now PO        Chest pain w/ known CAD w/ multi-vessel disease on [facility] in 2016 without lesions amenable for PCI/CABG; Admitted to telemetry and cardiology consulted; Recently discharged 6/9/20 admission for NSTEMI    Continued with conservative management c/w ASA/Plavix     H/o heart failure and HTN for which Lopressor, Imdur, and Lasix continued, as well as, Ranexa 6/15 increased to 1000mg BID; Started on Epogen to increase H/H for better oxygen carrying capacity    Lasix decreased from 40mg BID to QD in the setting of worsening renal function w/ h/o CKD3    Anemia in the setting of CKD but also iron deficiency for which hematology followed case; Given Venofer, Retacrit and was transfused 1 unit PRBC 6/17; Remains stable         Dispo - 89M h/o HTN, DM2, CKD3, CAD, systolic CHF (Severe LV Dysfunction w/ PPM [employer]) discharged 6/9 after admission for NSTEMI p/w CP a/w AOC CHF s/p IV Lasix now PO        Chest pain w/ known CAD w/ multi-vessel disease on [facility] in 2016 without lesions amenable for PCI/CABG; Admitted to telemetry and cardiology consulted; Recently discharged 6/9/20 admission for NSTEMI    Continued with conservative management c/w ASA/Plavix     H/o heart failure and HTN for which Lopressor, Imdur, and Lasix continued, as well as, Ranexa 6/15 increased to 1000mg BID; Started on Epogen to increase H/H for better oxygen carrying capacity    Lasix decreased from 40mg BID to QD in the setting of worsening renal function w/ h/o CKD3    Anemia in the setting of CKD but also iron deficiency for which hematology followed case; Given Venofer, Retacrit and was transfused 1 unit PRBC 6/17; Remains stable         Dispo - Home with home care 89M h/o HTN, DM2, CKD3, CAD, systolic CHF (Severe LV Dysfunction w/ PPM [employer]) discharged 6/9 after admission for NSTEMI p/w CP a/w AOC CHF s/p IV Lasix now PO        Chest pain w/ known CAD w/ multi-vessel disease on [facility] in 2016 without lesions amenable for PCI/CABG; Admitted to telemetry and cardiology consulted; Recently discharged 6/9/20 admission for NSTEMI    Continued with conservative management c/w ASA/Plavix     H/o heart failure and HTN for which Lopressor, Imdur, and Lasix continued, as well as, Ranexa 6/15 increased to 1000mg BID; Started on Epogen to increase H/H for better oxygen carrying capacity    Lasix decreased from 40mg BID to QD in the setting of worsening renal function w/ h/o CKD3    Anemia in the setting of CKD but also iron deficiency for which hematology followed case; Given Venofer, Retacrit and was transfused 1 unit PRBC 6/17; Remains stable         Dispo - Home with home care        Creatinine has increased from 1.92 to2.42 over last few days.  Will hold Lasix today. and start lasix @ 20mg in am.  PMD made aware.  Medications went over with PMD.

## 2020-06-19 NOTE — DISCHARGE NOTE PROVIDER - CARE PROVIDER_API CALL
Aleksandr Shipman  INTERNAL MEDICINE  [address], [place] [address]  Phone: [phone]  Fax: [phone]  Follow Up Time:

## 2020-06-19 NOTE — PROGRESS NOTE ADULT - SUBJECTIVE AND OBJECTIVE BOX
No pain, no shortness of breath. No complaints.     Review of systems: All 10 points ROS was obtained except as above.     aluminum hydroxide/magnesium hydroxide/simethicone Suspension 30 milliLiter(s) Oral every 4 hours PRN  aspirin enteric coated 81 milliGRAM(s) Oral daily  bisacodyl 5 milliGRAM(s) Oral every 12 hours PRN  cholecalciferol 1000 Unit(s) Oral daily  clopidogrel Tablet 75 milliGRAM(s) Oral daily  dextrose 40% Gel 15 Gram(s) Oral once PRN  dextrose 5%. 1000 milliLiter(s) IV Continuous <Continuous>  dextrose 50% Injectable 12.5 Gram(s) IV Push once  dextrose 50% Injectable 25 Gram(s) IV Push once  dextrose 50% Injectable 25 Gram(s) IV Push once  escitalopram 5 milliGRAM(s) Oral daily  folic acid 1 milliGRAM(s) Oral daily  furosemide    Tablet 40 milliGRAM(s) Oral daily  glucagon  Injectable 1 milliGRAM(s) IntraMuscular once PRN  heparin   Injectable 5000 Unit(s) SubCutaneous three times a day  insulin lispro (HumaLOG) corrective regimen sliding scale   SubCutaneous three times a day before meals  insulin lispro (HumaLOG) corrective regimen sliding scale   SubCutaneous at bedtime  isosorbide   mononitrate ER Tablet (IMDUR) 120 milliGRAM(s) Oral daily  melatonin 3 milliGRAM(s) Oral at bedtime  metoprolol tartrate 25 milliGRAM(s) Oral two times a day  pantoprazole    Tablet 40 milliGRAM(s) Oral before breakfast  polyethylene glycol 3350 17 Gram(s) Oral daily  ranolazine 1000 milliGRAM(s) Oral two times a day  senna 2 Tablet(s) Oral at bedtime  simvastatin 40 milliGRAM(s) Oral at bedtime      VITAL:  T(C): , Max: 36.7 (06-18-20 @ 22:27)  T(F): , Max: 98.1 (06-18-20 @ 22:27)  HR: 69 (06-19-20 @ 09:19)  BP: 114/66 (06-19-20 @ 09:19)  BP(mean): --  RR: 20 (06-19-20 @ 09:19)  SpO2: 97% (06-19-20 @ 09:19)  Wt(kg): --    06-18-20 @ 07:01  -  06-19-20 @ 07:00  --------------------------------------------------------  IN: 0 mL / OUT: 450 mL / NET: -450 mL    06-19-20 @ 07:01  -  06-19-20 @ 15:46  --------------------------------------------------------  IN: 0 mL / OUT: 300 mL / NET: -300 mL        PHYSICAL EXAM:  General: NAD; Alert and oriented to person, place and time.   HEENT:  NCAT; PERRLA  Neck: No JVD; supple  Respiratory: CTA-b/l  Cardiac: II/IV CLARISSA RRR s1s2  Gastrointestinal: BS+, soft, NT/ND  Urologic: No samaniego  Extremities: No peripheral edema      LABS:                          8.5    5.90  )-----------( x        ( 19 Jun 2020 14:47 )             27.4     Na(137)/K(5.0)/Cl(98)/HCO3(24)/BUN(60)/Cr(2.34)Glu(198)/Ca(9.1)/Mg(1.9)/PO4(3.8)    06-19 @ 14:25  Na(138)/K(4.8)/Cl(95)/HCO3(30)/BUN(58)/Cr(2.21)Glu(203)/Ca(9.6)/Mg(--)/PO4(--)    06-18 @ 15:03  Na(140)/K(4.7)/Cl(98)/HCO3(29)/BUN(57)/Cr(2.36)Glu(163)/Ca(9.0)/Mg(2.1)/PO4(--)    06-17 @ 04:42        06-19    137  |  98  |  60<H>  ----------------------------<  198<H>  5.0   |  24  |  2.34<H>    Ca    9.1      19 Jun 2020 14:25  Phos  3.8     06-19  Mg     1.9     06-19    TPro  5.6<L>  /  Alb  2.8<L>  /  TBili  0.3  /  DBili  x   /  AST  16  /  ALT  13  /  AlkPhos  70  06-19

## 2020-06-19 NOTE — DISCHARGE NOTE PROVIDER - NSDCMRMEDTOKEN_GEN_ALL_CORE_FT
aspirin 81 mg oral delayed release tablet: 1 tab(s) orally once a day  cholecalciferol oral tablet: 5000 unit(s) orally once a day  clopidogrel 75 mg oral tablet: 1 tab(s) orally once a day  escitalopram 5 mg oral tablet: 1 tab(s) orally once a day   folic acid 1 mg oral tablet: 1 tab(s) orally once a day (Last filled Andrea/2020 x 3 months)  furosemide 40 mg oral tablet: 1 tab(s) orally once a day  isosorbide mononitrate 120 mg oral tablet, extended release: 1 tab(s) orally once a day   metFORMIN 1000 mg oral tablet: 1 tab(s) orally 2 times a day   metoprolol tartrate 25 mg oral tablet: 1 tab(s) orally 2 times a day  pantoprazole 40 mg oral delayed release tablet: 1 tab(s) orally once a day (before a meal)   ranolazine 500 mg oral tablet, extended release: 1 tab(s) orally 2 times a day  simvastatin 40 mg oral tablet: 1 tab(s) orally once a day aspirin 81 mg oral delayed release tablet: 1 tab(s) orally once a day  cholecalciferol oral tablet: 5000 unit(s) orally once a day  clopidogrel 75 mg oral tablet: 1 tab(s) orally once a day  escitalopram 5 mg oral tablet: 1 tab(s) orally once a day   folic acid 1 mg oral tablet: 1 tab(s) orally once a day (Last filled Andrea/2020 x 3 months)  furosemide 40 mg oral tablet: 1 tab(s) orally once a day  isosorbide mononitrate 120 mg oral tablet, extended release: 1 tab(s) orally once a day   metFORMIN 1000 mg oral tablet: 1 tab(s) orally 2 times a day   metoprolol tartrate 25 mg oral tablet: 1 tab(s) orally 2 times a day  pantoprazole 40 mg oral delayed release tablet: 1 tab(s) orally once a day (before a meal)   polyethylene glycol 3350 oral powder for reconstitution: 17 gram(s) orally once a day  ranolazine 1000 mg oral tablet, extended release: 1 tab(s) orally 2 times a day  senna oral tablet: 2 tab(s) orally once a day (at bedtime)  simvastatin 40 mg oral tablet: 1 tab(s) orally once a day aspirin 81 mg oral delayed release tablet: 1 tab(s) orally once a day  cholecalciferol oral tablet: 5000 unit(s) orally once a day  clopidogrel 75 mg oral tablet: 1 tab(s) orally once a day  escitalopram 5 mg oral tablet: 1 tab(s) orally once a day   folic acid 1 mg oral tablet: 1 tab(s) orally once a day (Last filled Andrea/2020 x 3 months)  furosemide 40 mg oral tablet: 0.5 tab(s) orally once a day starting on 6/22  isosorbide mononitrate 120 mg oral tablet, extended release: 1 tab(s) orally once a day   metFORMIN 1000 mg oral tablet: 1 tab(s) orally 2 times a day   metoprolol tartrate 25 mg oral tablet: 1 tab(s) orally 2 times a day  pantoprazole 40 mg oral delayed release tablet: 1 tab(s) orally once a day (before a meal)   polyethylene glycol 3350 oral powder for reconstitution: 17 gram(s) orally once a day  ranolazine 1000 mg oral tablet, extended release: 1 tab(s) orally 2 times a day  senna oral tablet: 2 tab(s) orally once a day (at bedtime)  simvastatin 40 mg oral tablet: 1 tab(s) orally once a day

## 2020-06-19 NOTE — DISCHARGE NOTE PROVIDER - NSDCCPCAREPLAN_GEN_ALL_CORE_FT
PRINCIPAL DISCHARGE DIAGNOSIS  Diagnosis: Chest pain  Assessment and Plan of Treatment: Likely from heart failure exacerbation - Continue your medications as directed and please follow-up as an outpatient with your primary care provider for further care and recommendations.      SECONDARY DISCHARGE DIAGNOSES  Diagnosis: Acute on chronic systolic congestive heart failure  Assessment and Plan of Treatment: Continue recommended medication regimen, fluid restriction (Less than 1.5 Liters per day). Monitor for signs/symptoms of fluid overload and electrolyte abnormalities, such as, shortness of breath, cough, swelling, chest discomfort, changes in heart rate, dizziness, fainting, or changes in mental status. Keep track of your weight and call your outpatient physician if there are abrupt changes in weight. Follow-up with your outpatient provider after you've been discharged from the hospital for further care/recommendations.    Diagnosis: Essential hypertension  Assessment and Plan of Treatment: Continue blood pressure medication regimen as directed. Monitor for any visual changes, headaches or dizziness.  Monitor blood pressure regularly.  Follow up with your primary care provider for further management for high blood pressure.    Diagnosis: CAD (coronary artery disease)  Assessment and Plan of Treatment: Please continue your statin medication to control cholesterol levels, as well as, Aspirin and  Plavix as prescribed in order to optimize your heart health. Follow-up with your primary provider/cardiologist as outpatient for ongoing care. If you have persistent chest pain, shortness of breath, heart palpitations, or dizziness you should return to the emergency room.    Diagnosis: Anemia, unspecified type  Assessment and Plan of Treatment: You were given blood during your admission and your blood counts remained stable. Follow-up with your outpatient provider for further monitoring of your blood counts. Monitor for signs/symptoms indicating worsening of disease, such as, easy bleeding/bruising, pale skin, fatigue, dizziness, increased heart rate, or chest pain.

## 2020-06-19 NOTE — CHART NOTE - NSCHARTNOTEFT_GEN_A_CORE
Spoke to patient's son Aleksandr to ensure patient has a safe living environment and support - Patient has refused home care in the past and lives alone - Both sons are available to help with care - Made decision with CM and son to send patient home tomorrow with transportation and home care if H/H stable

## 2020-06-19 NOTE — PROGRESS NOTE ADULT - SUBJECTIVE AND OBJECTIVE BOX
Patient is a 89y old  Male who presents with a chief complaint of Chest pain (18 Jun 2020 13:02)      INTERVAL HPI/OVERNIGHT EVENTS: feels better, s/p transfusion     MEDICATIONS  (STANDING):  aspirin enteric coated 81 milliGRAM(s) Oral daily  cholecalciferol 1000 Unit(s) Oral daily  clopidogrel Tablet 75 milliGRAM(s) Oral daily  dextrose 5%. 1000 milliLiter(s) (50 mL/Hr) IV Continuous <Continuous>  dextrose 50% Injectable 12.5 Gram(s) IV Push once  dextrose 50% Injectable 25 Gram(s) IV Push once  dextrose 50% Injectable 25 Gram(s) IV Push once  escitalopram 5 milliGRAM(s) Oral daily  folic acid 1 milliGRAM(s) Oral daily  furosemide    Tablet 40 milliGRAM(s) Oral daily  heparin   Injectable 5000 Unit(s) SubCutaneous three times a day  insulin lispro (HumaLOG) corrective regimen sliding scale   SubCutaneous three times a day before meals  insulin lispro (HumaLOG) corrective regimen sliding scale   SubCutaneous at bedtime  isosorbide   mononitrate ER Tablet (IMDUR) 120 milliGRAM(s) Oral daily  melatonin 3 milliGRAM(s) Oral at bedtime  metoprolol tartrate 25 milliGRAM(s) Oral two times a day  pantoprazole    Tablet 40 milliGRAM(s) Oral before breakfast  ranolazine 1000 milliGRAM(s) Oral two times a day  simvastatin 40 milliGRAM(s) Oral at bedtime    MEDICATIONS  (PRN):  aluminum hydroxide/magnesium hydroxide/simethicone Suspension 30 milliLiter(s) Oral every 4 hours PRN Dyspepsia  dextrose 40% Gel 15 Gram(s) Oral once PRN Blood Glucose LESS THAN 70 milliGRAM(s)/deciliter  glucagon  Injectable 1 milliGRAM(s) IntraMuscular once PRN Glucose LESS THAN 70 milligrams/deciliter        Orders last 24 hours:  POCT  Blood Glucose (06-18-20 @ 11:39)  Complete Blood Count: STAT (06-18-20 @ 11:56)  Comprehensive Metabolic Panel: STAT (06-18-20 @ 11:56)  POCT  Blood Glucose (06-18-20 @ 16:54)  POCT  Blood Glucose (06-18-20 @ 21:59)  POCT  Blood Glucose (06-19-20 @ 07:46)      Allergies    No Known Allergies    Intolerances        REVIEW OF SYSTEMS:  CARDIOVASCULAR: No chest pain, palpitations, dizziness,  no shortness of breath     RESPIRATORY: No cough, wheezing, chills or hemoptysis; No shortness of breath    GASTROINTESTINAL: No abdominal or epigastric pain. No nausea, vomiting, or hematemesis; No diarrhea or constipation. No melena or hematochezia.    NEUROLOGICAL: No headaches, memory loss, loss of strength, numbness      PHYSICAL EXAM:  Vital Signs Last 24 Hrs  T(C): 36.3 (19 Jun 2020 06:04), Max: 36.7 (18 Jun 2020 22:27)  T(F): 97.4 (19 Jun 2020 06:04), Max: 98.1 (18 Jun 2020 22:27)  HR: 74 (19 Jun 2020 06:04) (61 - 74)  BP: 124/68 (19 Jun 2020 06:04) (103/59 - 129/70)  BP(mean): --  RR: 18 (19 Jun 2020 06:04) (16 - 19)  SpO2: 97% (19 Jun 2020 06:04) (97% - 100%)    GENERAL: NAD, well-groomed, well-developed  HEAD:  Atraumatic, Normocephalic  EYES: EOMI, PERRLA, conjunctiva and sclera clear  NECK: Supple, No JVD, Normal thyroid  NERVOUS SYSTEM:  Alert & Oriented X3, Good concentration;  and symmetric  CHEST/LUNG: Clear to auscultation bilaterally; No rales, rhonchi, wheezing, or rubs  HEART: S3S1S2 regular, with I-II/VI CLARISSA left lower sternal border, without  rub nor gallop  ABDOMEN: Soft, Nontender, Nondistended; Bowel sounds present  EXTREMITIES:  mild  doughy lower extremity carlin      LABS:                        9.0    6.40  )-----------( 228      ( 18 Jun 2020 15:03 )             30.0     18 Jun 2020 15:03    138    |  95     |  58     ----------------------------<  203    4.8     |  30     |  2.21     Ca    9.6        18 Jun 2020 15:03    TPro  6.4    /  Alb  3.6    /  TBili  0.4    /  DBili  x      /  AST  17     /  ALT  14     /  AlkPhos  82     18 Jun 2020 15:03      CAPILLARY BLOOD GLUCOSE      POCT Blood Glucose.: 186 mg/dL (19 Jun 2020 07:45)  POCT Blood Glucose.: 183 mg/dL (18 Jun 2020 21:58)  POCT Blood Glucose.: 173 mg/dL (18 Jun 2020 16:53)  POCT Blood Glucose.: 199 mg/dL (18 Jun 2020 11:38)      S     Consultant(s) Notes Reviewed:  nephrology       assessment:  Pt stable, s/p transfusion.       Plan:   check cbc today.  check renal function.  Ambulate patient.   D/C planning.      stool for occult blood pending. :

## 2020-06-19 NOTE — PROGRESS NOTE ADULT - ASSESSMENT
ASSESSMENT/PLAN    Mr. Monterroso is an 89 y.o gentleman with PMH of HTN, DM type 2 not on insulin, CKD stage 3, CAD, Chronic systolic CHF (severe LV Dysfunction), S/P PPM ([employer]) and discharged 6/9 after admission for NSTEMI presents with chest pain.  1. CKD stage 4 at present.    2. Anemia of CKD stage 3.  3. Secondary hyperparathyroidism.  4. Euvolemic     RECOMMENDATIONS  - Renal - Azotemia increasing. Lasix  40mg po qd.      - Anemia- he has been given Venofer, Retacrit and was transfused.  Hgb today is 8.5.    - CVS- Not in heart failure at present;  No active chest pain at present         Devi Bryant DO  [facility]  [phone]

## 2020-06-20 NOTE — PROGRESS NOTE ADULT - SUBJECTIVE AND OBJECTIVE BOX
Patient is a 89y old  Male who presents with a chief complaint of Chest pain (19 Jun 2020 12:22)      INTERVAL HPI/OVERNIGHT EVENTS: none.  He took out dentures overnight, thought he was in dentist's chair.     MEDICATIONS  (STANDING):  aspirin enteric coated 81 milliGRAM(s) Oral daily  cholecalciferol 1000 Unit(s) Oral daily  clopidogrel Tablet 75 milliGRAM(s) Oral daily  dextrose 5%. 1000 milliLiter(s) (50 mL/Hr) IV Continuous <Continuous>  dextrose 50% Injectable 12.5 Gram(s) IV Push once  dextrose 50% Injectable 25 Gram(s) IV Push once  dextrose 50% Injectable 25 Gram(s) IV Push once  escitalopram 5 milliGRAM(s) Oral daily  folic acid 1 milliGRAM(s) Oral daily  furosemide    Tablet 40 milliGRAM(s) Oral daily  heparin   Injectable 5000 Unit(s) SubCutaneous three times a day  insulin lispro (HumaLOG) corrective regimen sliding scale   SubCutaneous three times a day before meals  insulin lispro (HumaLOG) corrective regimen sliding scale   SubCutaneous at bedtime  isosorbide   mononitrate ER Tablet (IMDUR) 120 milliGRAM(s) Oral daily  melatonin 3 milliGRAM(s) Oral at bedtime  metoprolol tartrate 25 milliGRAM(s) Oral two times a day  pantoprazole    Tablet 40 milliGRAM(s) Oral before breakfast  polyethylene glycol 3350 17 Gram(s) Oral daily  ranolazine 1000 milliGRAM(s) Oral two times a day  senna 2 Tablet(s) Oral at bedtime  simvastatin 40 milliGRAM(s) Oral at bedtime    MEDICATIONS  (PRN):  aluminum hydroxide/magnesium hydroxide/simethicone Suspension 30 milliLiter(s) Oral every 4 hours PRN Dyspepsia  bisacodyl 5 milliGRAM(s) Oral every 12 hours PRN Constipation  dextrose 40% Gel 15 Gram(s) Oral once PRN Blood Glucose LESS THAN 70 milliGRAM(s)/deciliter  glucagon  Injectable 1 milliGRAM(s) IntraMuscular once PRN Glucose LESS THAN 70 milligrams/deciliter        Allergies    No Known Allergies    Intolerances        REVIEW OF SYSTEMS:  CARDIOVASCULAR: No chest pain, palpitations, dizziness, or leg swelling; no shortness of breath     RESPIRATORY: No cough, wheezing, chills or hemoptysis; No shortness of breath    GASTROINTESTINAL: No abdominal or epigastric pain. No nausea, vomiting, or hematemesis; No diarrhea or constipation. No melena or hematochezia.    NEUROLOGICAL: No headaches, memory loss, loss of strength, numbness      PHYSICAL EXAM:  Vital Signs Last 24 Hrs  T(C): 36.4 (20 Jun 2020 05:10), Max: 36.7 (19 Jun 2020 09:19)  T(F): 97.6 (20 Jun 2020 05:10), Max: 98.1 (19 Jun 2020 22:19)  HR: 79 (20 Jun 2020 05:10) (69 - 80)  BP: 136/80 (20 Jun 2020 05:10) (114/66 - 136/80)  BP(mean): --  RR: 20 (20 Jun 2020 05:10) (18 - 20)  SpO2: 94% (20 Jun 2020 05:10) (94% - 99%)    GENERAL: NAD, well-groomed, well-developed  HEAD:  Atraumatic, Normocephalic  EYES: EOMI, PERRLA, conjunctiva and sclera clear  NECK: Supple, No JVD, Normal thyroid  NERVOUS SYSTEM:  Alert & Oriented X 2-3, Good concentration;  and symmetric  CHEST/LUNG: Clear to auscultation bilaterally; No rales, rhonchi, wheezing, or rubs  HEART: S4S1S2 regular, with I/VI systolic murmur lower sternal border  ABDOMEN: Soft, Nontender, Nondistended; Bowel sounds present  EXTREMITIES:  , No clubbing, cyanosis, or edema      LABS:                        8.5    5.90  )-----------( x        ( 19 Jun 2020 14:47 )             27.4     19 Jun 2020 14:25    137    |  98     |  60     ----------------------------<  198    5.0     |  24     |  2.34     Ca    9.1        19 Jun 2020 14:25  Phos  3.8       19 Jun 2020 14:25  Mg     1.9       19 Jun 2020 14:25    TPro  5.6    /  Alb  2.8    /  TBili  0.3    /  DBili  x      /  AST  16     /  ALT  13     /  AlkPhos  70     19 Jun 2020 14:25      CAPILLARY BLOOD GLUCOSE      POCT Blood Glucose.: 308 mg/dL (19 Jun 2020 21:47)  POCT Blood Glucose.: 151 mg/dL (19 Jun 2020 16:46)  POCT Blood Glucose.: 225 mg/dL (19 Jun 2020 11:51)  POCT Blood Glucose.: 186 mg/dL (19 Jun 2020 07:45)      < from: Transthoracic Echocardiogram (02.19.20 @ 12:19) >  ------------------------------------------------------------------------  CONCLUSIONS:  1. Tethered mitral valve leaflets with normal opening.  Moderate-severe mitral regurgitation.  2. Calcified trileaflet aortic valve with normal opening.  Mild aortic regurgitation.  3. Severe global left ventricular systolic dysfunction.  4. Right ventricular enlargement with decreased right  ventricular systolic function.  5. Normal tricuspid valve. Moderate tricuspid  regurgitation.  6. Estimated pulmonary artery systolic pressure equals 50  mm Hg, assuming right atrial pressure equals 10  mm Hg,  consistent with moderate pulmonary hypertension.    < end of copied text >    Assessment- 89 yo male with HFrEF, MR, DM, anemia, CKD  CAD not amenable to revascularization.  H/H slightly reduced yesterday - ? Re-equilibration.     Plan:   check cbc.  today.  check renal function.  Ambulate patient.   D/C planning.   Occult blood requested and pending      stool for occult blood pending. :

## 2020-06-20 NOTE — PROGRESS NOTE ADULT - REASON FOR ADMISSION
Chest pain

## 2020-06-20 NOTE — CHART NOTE - NSCHARTNOTEFT_GEN_A_CORE
Case discussed with Dr Shipman, patient's Cr has inc from 1.9 to 2.42 over three days.  Will hold Lasix x 1 day and restarts Lasix 20mg QD.  Neeru ALMEIDA

## 2020-06-20 NOTE — PROGRESS NOTE ADULT - ASSESSMENT
aluminum hydroxide/magnesium hydroxide/simethicone Suspension 30 milliLiter(s) Oral every 4 hours PRN  aspirin enteric coated 81 milliGRAM(s) Oral daily  bisacodyl 5 milliGRAM(s) Oral every 12 hours PRN  cholecalciferol 1000 Unit(s) Oral daily  clopidogrel Tablet 75 milliGRAM(s) Oral daily  dextrose 40% Gel 15 Gram(s) Oral once PRN  dextrose 5%. 1000 milliLiter(s) IV Continuous <Continuous>  dextrose 50% Injectable 12.5 Gram(s) IV Push once  dextrose 50% Injectable 25 Gram(s) IV Push once  dextrose 50% Injectable 25 Gram(s) IV Push once  escitalopram 5 milliGRAM(s) Oral daily  folic acid 1 milliGRAM(s) Oral daily  furosemide    Tablet 40 milliGRAM(s) Oral daily  glucagon  Injectable 1 milliGRAM(s) IntraMuscular once PRN  heparin   Injectable 5000 Unit(s) SubCutaneous three times a day  insulin lispro (HumaLOG) corrective regimen sliding scale   SubCutaneous three times a day before meals  insulin lispro (HumaLOG) corrective regimen sliding scale   SubCutaneous at bedtime  isosorbide   mononitrate ER Tablet (IMDUR) 120 milliGRAM(s) Oral daily  melatonin 3 milliGRAM(s) Oral at bedtime  metoprolol tartrate 25 milliGRAM(s) Oral two times a day  pantoprazole    Tablet 40 milliGRAM(s) Oral before breakfast  polyethylene glycol 3350 17 Gram(s) Oral daily  ranolazine 1000 milliGRAM(s) Oral two times a day  senna 2 Tablet(s) Oral at bedtime  simvastatin 40 milliGRAM(s) Oral at bedtime      VITAL:  T(C): , Max: 36.7 (06-19-20 @ 09:19)  T(F): , Max: 98.1 (06-19-20 @ 22:19)  HR: 79 (06-20-20 @ 05:10)  BP: 136/80 (06-20-20 @ 05:10)  BP(mean): --  RR: 20 (06-20-20 @ 05:10)  SpO2: 94% (06-20-20 @ 05:10)      06-19-20 @ 07:01  -  06-20-20 @ 07:00  --------------------------------------------------------  IN: 745 mL / OUT: 800 mL / NET: -55 mL        PHYSICAL EXAM:  General: NAD; Alert    HEENT:  NCAT; PERRLA  Neck: No JVD; supple  Respiratory: CTA-b/l  Cardiac: II/IV CLARISSA RRR s1s2  Gastrointestinal: BS+, soft, NT/ND  Urologic: No samaniego  Extremities: No peripheral edema      LABS:                          8.5    6.69  )-----------( 226      ( 20 Jun 2020 06:30 )             27.5     Na(138)/K(5.1)/Cl(97)/HCO3(27)/BUN(63)/Cr(2.42)Glu(180)/Ca(9.5)/Mg(2.0)/PO4(4.2)    06-20 @ 06:30  Na(137)/K(5.0)/Cl(98)/HCO3(24)/BUN(60)/Cr(2.34)Glu(198)/Ca(9.1)/Mg(1.9)/PO4(3.8)    06-19 @ 14:25  Na(138)/K(4.8)/Cl(95)/HCO3(30)/BUN(58)/Cr(2.21)Glu(203)/Ca(9.6)/Mg(--)/PO4(--)    06-18 @ 15:03            ASSESSMENT/PLAN    Mr. Monterroso is an 89 y.o gentleman with PMH of HTN, DM type 2 not on insulin, CKD stage 3, CAD, Chronic systolic CHF (severe LV Dysfunction), S/P PPM ([employer]) and discharged 6/9 after admission for NSTEMI presents with chest pain.  1. CKD stage 4 at present.    2. Anemia of CKD stage 3.  3. Secondary hyperparathyroidism.  4. Euvolemic     RECOMMENDATIONS  - Renal - Azotemia increasing. Lasix  40mg po qd.      - Anemia- he has been given Venofer, Retacrit and was transfused.  Hgb today is 8.5.    - CVS- Not in heart failure at present;  No active chest pain at present    Garrett Kaye NP-BC  [facility]  [phone] Seen and examined at bedside eating breakfast. Denies pain, sob. Wants to go home    aluminum hydroxide/magnesium hydroxide/simethicone Suspension 30 milliLiter(s) Oral every 4 hours PRN  aspirin enteric coated 81 milliGRAM(s) Oral daily  bisacodyl 5 milliGRAM(s) Oral every 12 hours PRN  cholecalciferol 1000 Unit(s) Oral daily  clopidogrel Tablet 75 milliGRAM(s) Oral daily  dextrose 40% Gel 15 Gram(s) Oral once PRN  dextrose 5%. 1000 milliLiter(s) IV Continuous <Continuous>  dextrose 50% Injectable 12.5 Gram(s) IV Push once  dextrose 50% Injectable 25 Gram(s) IV Push once  dextrose 50% Injectable 25 Gram(s) IV Push once  escitalopram 5 milliGRAM(s) Oral daily  folic acid 1 milliGRAM(s) Oral daily  furosemide    Tablet 40 milliGRAM(s) Oral daily  glucagon  Injectable 1 milliGRAM(s) IntraMuscular once PRN  heparin   Injectable 5000 Unit(s) SubCutaneous three times a day  insulin lispro (HumaLOG) corrective regimen sliding scale   SubCutaneous three times a day before meals  insulin lispro (HumaLOG) corrective regimen sliding scale   SubCutaneous at bedtime  isosorbide   mononitrate ER Tablet (IMDUR) 120 milliGRAM(s) Oral daily  melatonin 3 milliGRAM(s) Oral at bedtime  metoprolol tartrate 25 milliGRAM(s) Oral two times a day  pantoprazole    Tablet 40 milliGRAM(s) Oral before breakfast  polyethylene glycol 3350 17 Gram(s) Oral daily  ranolazine 1000 milliGRAM(s) Oral two times a day  senna 2 Tablet(s) Oral at bedtime  simvastatin 40 milliGRAM(s) Oral at bedtime      VITAL:  T(C): , Max: 36.7 (06-19-20 @ 09:19)  T(F): , Max: 98.1 (06-19-20 @ 22:19)  HR: 79 (06-20-20 @ 05:10)  BP: 136/80 (06-20-20 @ 05:10)  BP(mean): --  RR: 20 (06-20-20 @ 05:10)  SpO2: 94% (06-20-20 @ 05:10)      06-19-20 @ 07:01  -  06-20-20 @ 07:00  --------------------------------------------------------  IN: 745 mL / OUT: 800 mL / NET: -55 mL        PHYSICAL EXAM:  General: NAD; Alert    HEENT:  NCAT; PERRLA  Neck: No JVD; supple  Respiratory: CTA-b/l  Cardiac: II/IV CLARISSA RRR s1s2  Gastrointestinal: BS+, soft, NT/ND  Urologic: No samaniego  Extremities: No peripheral edema      LABS:                          8.5    6.69  )-----------( 226      ( 20 Jun 2020 06:30 )             27.5     Na(138)/K(5.1)/Cl(97)/HCO3(27)/BUN(63)/Cr(2.42)Glu(180)/Ca(9.5)/Mg(2.0)/PO4(4.2)    06-20 @ 06:30  Na(137)/K(5.0)/Cl(98)/HCO3(24)/BUN(60)/Cr(2.34)Glu(198)/Ca(9.1)/Mg(1.9)/PO4(3.8)    06-19 @ 14:25  Na(138)/K(4.8)/Cl(95)/HCO3(30)/BUN(58)/Cr(2.21)Glu(203)/Ca(9.6)/Mg(--)/PO4(--)    06-18 @ 15:03      ASSESSMENT/PLAN    Mr. Monterroso is an 89 y.o gentleman with PMH of HTN, DM type 2 not on insulin, CKD stage 3, CAD, Chronic systolic CHF (severe LV Dysfunction), S/P PPM ([employer]) and discharged 6/9 after admission for NSTEMI presents with chest pain.  1. CKD stage 4 at present.    2. Anemia of CKD stage 3.  3. Secondary hyperparathyroidism.  4. Lytes-acceptable at present    RECOMMENDATIONS  - Renal - Azotemia increasing.  Agree holding Lasix  x 1 day and restarting 20mg QD     - Anemia- S/p Venofer, Retacrit and transfusion 1/2 prbcs on 6/17/20.  Hgb today is 8.5.  stool for occult blood pending    - CVS-  No active chest pain at present    Garrett Kaye NP-BC  [facility]  [phone]

## 2022-02-10 NOTE — ED PROVIDER NOTE - RESPIRATORY [+], MLM
Ov 2/11/22    Remote transmission received for patient's dual chamber PACEMAKER. Transmission shows normal sensing and pacing function. EP physician will review. See interrogation under the cardiology tab in the [address] [place] [address] [address] field for more details. Will continue to monitor remotely. Pacing (% of Time Since 11-Jan-2022)   99.3% (MVP Off)  AP 6.9%  No new arrhythmias. Episodes Since: 12-Oct-2021  1 Monitored VT and 5 Non-sustained VT-EGMS show NSVT -longest 11 sec. VT-NS 2 15-Nov-2021 11:32 -? Termination-see egm. HE IS NOT ON AMIO AS OF 8/26/21. Aurelio Corona MD   1/13/2022  2:21 PM EST         Reviewed.  NSVT. No therapy.  Will monitor for now.       8/26/21-Continue Eliquis twice daily given intracardiac mass and atrial fibrillation history is. Presently off his amiodarone with thyroid complication and will have EP follow-up. Echo:     TTE 8/21/21:   Summary   Limited echo for LV function and pericardial effusion/EF   Normal left ventricle size and wall thickness.   The left ventricular systolic function is normal at 55%.   Abnormal (paradoxical) septal motion is present likely due to right   ventricular pacing.   . 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
spouse
EXERTIONAL DYSPNEA

## 2022-04-24 ENCOUNTER — FORM ENCOUNTER (OUTPATIENT)
Age: 87
End: 2022-04-24

## 2022-04-25 PROBLEM — Z86.79 PERSONAL HISTORY OF OTHER DISEASES OF THE CIRCULATORY SYSTEM: Chronic | Status: ACTIVE | Noted: 2020-01-01

## 2022-05-24 ENCOUNTER — APPOINTMENT (OUTPATIENT)
Dept: ELECTROPHYSIOLOGY | Facility: CLINIC | Age: 87
End: 2022-05-24

## 2022-06-03 NOTE — PATIENT PROFILE ADULT - SURGICAL SITE INCISION
Discussed heat/NSAIDS for mild back pain. Discussed s/s of kidney stone and when to seek care if pain worsens. Pt understands and agrees with plan.    no

## 2023-02-25 NOTE — PROGRESS NOTE ADULT - PROVIDER SPECIALTY LIST ADULT
Cardiology Department of Emergency Medicine    FIRST PROVIDER TRIAGE NOTE             Independent MLP           2/25/23  10:51 AM EST    Date of Encounter: 2/25/23   MRN: [id number]    Vitals:    02/25/23 1041   Pulse: 88   Resp: 18   Temp: 98.7 °F (37.1 °C)   SpO2: 100%      HPI: Smith Bains is a 39 y.o. male who presents to the ED for No chief complaint on file. ROS: Negative for abd pain, back pain, or vomiting. Physical Exam:   Gen Appearance/Constitutional: alert  CV: regular rate     Initial Plan of Care: All treatment areas with department are currently occupied. Plan to order/Initiate the following while awaiting opening in ED: labs, EKG, and imaging studies.     Initial Plan of Care: Initiate Treatment-Testing, Proceed toTreatment Area When Bed Available for ED Attending/MLP to Continue Care    Electronically signed by Diana Celis PA-C   DD: 2/25/23       Diana Celis PA-C  02/25/23 105

## 2023-06-02 NOTE — PHYSICAL THERAPY INITIAL EVALUATION ADULT - HEALTH SCREEN CRITERIA
Patient is here for the Shingrix #2 vaccine given left deltoid IM.  VIS form given to patient. Patient left the clinic in stable condition after 15 minutes.  Patient did not have a reaction after vaccine.  
yes

## 2024-06-02 NOTE — PROGRESS NOTE ADULT - PROBLEM SELECTOR PLAN 2
[Normal Conjunctiva] : normal conjunctiva [Normal S1, S2] : normal S1, S2 [Soft] : abdomen soft [Non Tender] : non-tender No evidence of acute coronary syndrome upon admission  Cont imdur, metoprolol, aspirin, and zocor  EKG 4/11 with RBBB, 1st degree AV block, no acute SABRINA's or STD's [Normal Bowel Sounds] : normal bowel sounds [Normal Gait] : normal gait [No Rash] : no rash [No Focal Deficits] : no focal deficits [de-identified] : Appears in no distress lying flat [de-identified] : No neck vein distention.  No carotid bruit [de-identified] : No murmur.  No gallop.  No diastolic sounds. [de-identified] :  Poor air exchange.  No rales.  No wheezing [de-identified] : Full range of motion [de-identified] : No peripheral edema.  Dorsalis pedis pulses +2 bilaterally no ulcerations [de-identified] : pleasant

## 2025-01-10 NOTE — ED ADULT NURSE NOTE - ISOLATION TYPE:
Procedure:  COLONOSCOPY    Relevant Problems   ANESTHESIA (within normal limits)      CARDIO (within normal limits)   (-) Chest pain   (-) EVANS (dyspnea on exertion)      GI/HEPATIC   (+) Gastroesophageal reflux disease      NEURO/PSYCH (within normal limits)   (-) CVA (cerebral vascular accident) (HCC)   (-) Seizures (HCC)      PULMONARY (within normal limits)   (-) Asthma   (-) Smoking   (-) URI (upper respiratory infection)      Other   (+) Obesity (BMI 30.0-34.9)        Physical Exam    Airway    Mallampati score: III  TM Distance: >3 FB  Neck ROM: full     Dental   No notable dental hx     Cardiovascular  Cardiovascular exam normal    Pulmonary  Pulmonary exam normal     Other Findings  post-pubertal.      Anesthesia Plan  ASA Score- 2     Anesthesia Type- IV sedation with anesthesia with ASA Monitors.         Additional Monitors:     Airway Plan:            Plan Factors-Exercise tolerance (METS): >4 METS.    Chart reviewed. EKG reviewed. Imaging results reviewed. Existing labs reviewed. Patient summary reviewed.    Patient is not a current smoker.      Obstructive sleep apnea risk education given perioperatively.        Induction- intravenous.    Postoperative Plan-         Informed Consent- Anesthetic plan and risks discussed with patient.  I personally reviewed this patient with the CRNA. Discussed and agreed on the Anesthesia Plan with the CRNA..         None